# Patient Record
Sex: MALE | Race: WHITE | NOT HISPANIC OR LATINO | Employment: FULL TIME | URBAN - METROPOLITAN AREA
[De-identification: names, ages, dates, MRNs, and addresses within clinical notes are randomized per-mention and may not be internally consistent; named-entity substitution may affect disease eponyms.]

---

## 2017-06-16 ENCOUNTER — TELEPHONE (OUTPATIENT)
Dept: ORTHOPEDICS | Facility: CLINIC | Age: 32
End: 2017-06-16

## 2020-01-07 ENCOUNTER — CLINICAL SUPPORT (OUTPATIENT)
Dept: URGENT CARE | Facility: CLINIC | Age: 35
End: 2020-01-07

## 2020-01-07 PROCEDURE — 99499 PR PHYSICAL - DOT/CDL: ICD-10-PCS | Mod: S$GLB,,, | Performed by: NURSE PRACTITIONER

## 2020-01-07 PROCEDURE — 99499 UNLISTED E&M SERVICE: CPT | Mod: S$GLB,,, | Performed by: NURSE PRACTITIONER

## 2020-08-07 ENCOUNTER — HOSPITAL ENCOUNTER (INPATIENT)
Facility: HOSPITAL | Age: 35
LOS: 1 days | Discharge: HOME | DRG: 310 | End: 2020-08-08
Attending: EMERGENCY MEDICINE | Admitting: INTERNAL MEDICINE
Payer: MEDICAID

## 2020-08-07 ENCOUNTER — CLINICAL SUPPORT (OUTPATIENT)
Dept: CARDIOLOGY | Facility: HOSPITAL | Age: 35
DRG: 310 | End: 2020-08-07
Attending: INTERNAL MEDICINE
Payer: MEDICAID

## 2020-08-07 DIAGNOSIS — R07.9 CHEST PAIN: ICD-10-CM

## 2020-08-07 DIAGNOSIS — I48.91 RAPID ATRIAL FIBRILLATION: ICD-10-CM

## 2020-08-07 DIAGNOSIS — I48.91 ATRIAL FIBRILLATION, UNSPECIFIED TYPE: Primary | ICD-10-CM

## 2020-08-07 DIAGNOSIS — I48.91 A-FIB: ICD-10-CM

## 2020-08-07 LAB
ALBUMIN SERPL BCP-MCNC: 4.8 G/DL (ref 3.5–5.2)
ALP SERPL-CCNC: 67 U/L (ref 55–135)
ALT SERPL W/O P-5'-P-CCNC: 36 U/L (ref 10–44)
AMPHET+METHAMPHET UR QL: NEGATIVE
ANION GAP SERPL CALC-SCNC: 12 MMOL/L (ref 8–16)
AORTIC ROOT ANNULUS: 2.17 CM
AORTIC VALVE CUSP SEPERATION: 1.17 CM
AST SERPL-CCNC: 28 U/L (ref 10–40)
AV INDEX (PROSTH): 0.93
AV MEAN GRADIENT: 3 MMHG
AV PEAK GRADIENT: 6 MMHG
AV VALVE AREA: 2.83 CM2
AV VELOCITY RATIO: 83.07
BARBITURATES UR QL SCN>200 NG/ML: NEGATIVE
BASOPHILS # BLD AUTO: 0.12 K/UL (ref 0–0.2)
BASOPHILS NFR BLD: 1.2 % (ref 0–1.9)
BENZODIAZ UR QL SCN>200 NG/ML: NEGATIVE
BILIRUB SERPL-MCNC: 1.4 MG/DL (ref 0.1–1)
BNP SERPL-MCNC: 50 PG/ML (ref 0–99)
BSA FOR ECHO PROCEDURE: 2.33 M2
BUN SERPL-MCNC: 17 MG/DL (ref 6–20)
BZE UR QL SCN: NEGATIVE
CALCIUM SERPL-MCNC: 9.7 MG/DL (ref 8.7–10.5)
CANNABINOIDS UR QL SCN: NORMAL
CHLORIDE SERPL-SCNC: 104 MMOL/L (ref 95–110)
CK MB SERPL-MCNC: 2.6 NG/ML (ref 0.1–6.5)
CK SERPL-CCNC: 220 U/L (ref 20–200)
CO2 SERPL-SCNC: 23 MMOL/L (ref 23–29)
CREAT SERPL-MCNC: 1.3 MG/DL (ref 0.5–1.4)
CREAT UR-MCNC: 86 MG/DL (ref 23–375)
CV ECHO LV RWT: 0.42 CM
D DIMER PPP IA.FEU-MCNC: <0.27 UG/ML FEU
DIFFERENTIAL METHOD: ABNORMAL
DOP CALC AO PEAK VEL: 1.21 M/S
DOP CALC AO VTI: 18.61 CM
DOP CALC LVOT AREA: 3 CM2
DOP CALC LVOT DIAMETER: 1.97 CM
DOP CALC LVOT PEAK VEL: 100.52 M/S
DOP CALC LVOT STROKE VOLUME: 52.58 CM3
DOP CALCLVOT PEAK VEL VTI: 17.26 CM
E WAVE DECELERATION TIME: 201.65 MSEC
E/E' RATIO: 6.77 M/S
ECHO LV POSTERIOR WALL: 1 CM (ref 0.6–1.1)
EOSINOPHIL # BLD AUTO: 0.3 K/UL (ref 0–0.5)
EOSINOPHIL NFR BLD: 3.5 % (ref 0–8)
ERYTHROCYTE [DISTWIDTH] IN BLOOD BY AUTOMATED COUNT: 12.4 % (ref 11.5–14.5)
EST. GFR  (AFRICAN AMERICAN): >60 ML/MIN/1.73 M^2
EST. GFR  (NON AFRICAN AMERICAN): >60 ML/MIN/1.73 M^2
FRACTIONAL SHORTENING: 31 % (ref 28–44)
GLUCOSE SERPL-MCNC: 143 MG/DL (ref 70–110)
HCT VFR BLD AUTO: 54.1 % (ref 40–54)
HGB BLD-MCNC: 18.8 G/DL (ref 14–18)
IMM GRANULOCYTES # BLD AUTO: 0.05 K/UL (ref 0–0.04)
IMM GRANULOCYTES NFR BLD AUTO: 0.5 % (ref 0–0.5)
INTERVENTRICULAR SEPTUM: 1 CM (ref 0.6–1.1)
LEFT ATRIUM SIZE: 2.63 CM
LEFT INTERNAL DIMENSION IN SYSTOLE: 3.3 CM (ref 2.1–4)
LEFT VENTRICLE DIASTOLIC VOLUME INDEX: 40.67 ML/M2
LEFT VENTRICLE DIASTOLIC VOLUME: 92.79 ML
LEFT VENTRICLE MASS INDEX: 75 G/M2
LEFT VENTRICLE SYSTOLIC VOLUME INDEX: 19.2 ML/M2
LEFT VENTRICLE SYSTOLIC VOLUME: 43.71 ML
LEFT VENTRICULAR INTERNAL DIMENSION IN DIASTOLE: 4.8 CM (ref 3.5–6)
LEFT VENTRICULAR MASS: 170.19 G
LV LATERAL E/E' RATIO: 6.29 M/S
LV SEPTAL E/E' RATIO: 7.33 M/S
LYMPHOCYTES # BLD AUTO: 3 K/UL (ref 1–4.8)
LYMPHOCYTES NFR BLD: 31.3 % (ref 18–48)
MAGNESIUM SERPL-MCNC: 2.3 MG/DL (ref 1.6–2.6)
MCH RBC QN AUTO: 30.9 PG (ref 27–31)
MCHC RBC AUTO-ENTMCNC: 34.8 G/DL (ref 32–36)
MCV RBC AUTO: 89 FL (ref 82–98)
MONOCYTES # BLD AUTO: 0.6 K/UL (ref 0.3–1)
MONOCYTES NFR BLD: 6.4 % (ref 4–15)
MV PEAK E VEL: 0.88 M/S
NEUTROPHILS # BLD AUTO: 5.5 K/UL (ref 1.8–7.7)
NEUTROPHILS NFR BLD: 57.1 % (ref 38–73)
NRBC BLD-RTO: 0 /100 WBC
OPIATES UR QL SCN: NEGATIVE
PCP UR QL SCN>25 NG/ML: NEGATIVE
PISA TR MAX VEL: 2.81 M/S
PLATELET # BLD AUTO: 332 K/UL (ref 150–350)
PMV BLD AUTO: 10.9 FL (ref 9.2–12.9)
POTASSIUM SERPL-SCNC: 4.1 MMOL/L (ref 3.5–5.1)
PROT SERPL-MCNC: 8.4 G/DL (ref 6–8.4)
PV PEAK VELOCITY: 130.04 CM/S
RA PRESSURE: 3 MMHG
RBC # BLD AUTO: 6.08 M/UL (ref 4.6–6.2)
SARS-COV-2 RDRP RESP QL NAA+PROBE: NEGATIVE
SODIUM SERPL-SCNC: 139 MMOL/L (ref 136–145)
TDI LATERAL: 0.14 M/S
TDI SEPTAL: 0.12 M/S
TDI: 0.13 M/S
TOXICOLOGY INFORMATION: NORMAL
TR MAX PG: 32 MMHG
TROPONIN I SERPL DL<=0.01 NG/ML-MCNC: <0.03 NG/ML
TSH SERPL DL<=0.005 MIU/L-ACNC: 1.32 UIU/ML (ref 0.34–5.6)
TV REST PULMONARY ARTERY PRESSURE: 35 MMHG
WBC # BLD AUTO: 9.63 K/UL (ref 3.9–12.7)

## 2020-08-07 PROCEDURE — 93306 TTE W/DOPPLER COMPLETE: CPT

## 2020-08-07 PROCEDURE — 25000003 PHARM REV CODE 250: Performed by: INTERNAL MEDICINE

## 2020-08-07 PROCEDURE — 99900035 HC TECH TIME PER 15 MIN (STAT)

## 2020-08-07 PROCEDURE — 94761 N-INVAS EAR/PLS OXIMETRY MLT: CPT

## 2020-08-07 PROCEDURE — 80053 COMPREHEN METABOLIC PANEL: CPT

## 2020-08-07 PROCEDURE — 25500020 PHARM REV CODE 255: Performed by: EMERGENCY MEDICINE

## 2020-08-07 PROCEDURE — 25000003 PHARM REV CODE 250: Performed by: EMERGENCY MEDICINE

## 2020-08-07 PROCEDURE — 85379 FIBRIN DEGRADATION QUANT: CPT

## 2020-08-07 PROCEDURE — 36415 COLL VENOUS BLD VENIPUNCTURE: CPT

## 2020-08-07 PROCEDURE — U0002 COVID-19 LAB TEST NON-CDC: HCPCS

## 2020-08-07 PROCEDURE — 96372 THER/PROPH/DIAG INJ SC/IM: CPT | Mod: 59

## 2020-08-07 PROCEDURE — 99291 CRITICAL CARE FIRST HOUR: CPT

## 2020-08-07 PROCEDURE — 63600175 PHARM REV CODE 636 W HCPCS: Performed by: EMERGENCY MEDICINE

## 2020-08-07 PROCEDURE — 84484 ASSAY OF TROPONIN QUANT: CPT | Mod: 91

## 2020-08-07 PROCEDURE — 82550 ASSAY OF CK (CPK): CPT

## 2020-08-07 PROCEDURE — 63600175 PHARM REV CODE 636 W HCPCS: Performed by: INTERNAL MEDICINE

## 2020-08-07 PROCEDURE — 21000000 HC CCU ICU ROOM CHARGE

## 2020-08-07 PROCEDURE — 85025 COMPLETE CBC W/AUTO DIFF WBC: CPT

## 2020-08-07 PROCEDURE — 83735 ASSAY OF MAGNESIUM: CPT

## 2020-08-07 PROCEDURE — 80307 DRUG TEST PRSMV CHEM ANLYZR: CPT

## 2020-08-07 PROCEDURE — 84443 ASSAY THYROID STIM HORMONE: CPT

## 2020-08-07 PROCEDURE — 83880 ASSAY OF NATRIURETIC PEPTIDE: CPT

## 2020-08-07 PROCEDURE — 82553 CREATINE MB FRACTION: CPT

## 2020-08-07 PROCEDURE — 93005 ELECTROCARDIOGRAM TRACING: CPT | Performed by: INTERNAL MEDICINE

## 2020-08-07 PROCEDURE — 96374 THER/PROPH/DIAG INJ IV PUSH: CPT

## 2020-08-07 RX ORDER — DIGOXIN 0.25 MG/ML
250 INJECTION INTRAMUSCULAR; INTRAVENOUS ONCE
Status: COMPLETED | OUTPATIENT
Start: 2020-08-07 | End: 2020-08-07

## 2020-08-07 RX ORDER — ENOXAPARIN SODIUM 100 MG/ML
100 INJECTION SUBCUTANEOUS
Status: COMPLETED | OUTPATIENT
Start: 2020-08-07 | End: 2020-08-07

## 2020-08-07 RX ORDER — ADENOSINE 3 MG/ML
6 INJECTION, SOLUTION INTRAVENOUS
Status: COMPLETED | OUTPATIENT
Start: 2020-08-07 | End: 2020-08-07

## 2020-08-07 RX ORDER — LANOLIN ALCOHOL/MO/W.PET/CERES
800 CREAM (GRAM) TOPICAL
Status: DISCONTINUED | OUTPATIENT
Start: 2020-08-07 | End: 2020-08-08 | Stop reason: HOSPADM

## 2020-08-07 RX ORDER — DILTIAZEM HYDROCHLORIDE 5 MG/ML
10 INJECTION INTRAVENOUS
Status: DISCONTINUED | OUTPATIENT
Start: 2020-08-07 | End: 2020-08-07

## 2020-08-07 RX ORDER — DILTIAZEM HYDROCHLORIDE 5 MG/ML
10 INJECTION INTRAVENOUS
Status: COMPLETED | OUTPATIENT
Start: 2020-08-07 | End: 2020-08-07

## 2020-08-07 RX ORDER — POTASSIUM CHLORIDE 1.5 G/1.58G
40 POWDER, FOR SOLUTION ORAL
Status: DISCONTINUED | OUTPATIENT
Start: 2020-08-07 | End: 2020-08-08 | Stop reason: HOSPADM

## 2020-08-07 RX ORDER — ENOXAPARIN SODIUM 300 MG/3ML
1 INJECTION INTRAVENOUS; SUBCUTANEOUS
Status: DISCONTINUED | OUTPATIENT
Start: 2020-08-07 | End: 2020-08-07

## 2020-08-07 RX ORDER — SODIUM CHLORIDE, SODIUM LACTATE, POTASSIUM CHLORIDE, CALCIUM CHLORIDE 600; 310; 30; 20 MG/100ML; MG/100ML; MG/100ML; MG/100ML
1000 INJECTION, SOLUTION INTRAVENOUS
Status: COMPLETED | OUTPATIENT
Start: 2020-08-07 | End: 2020-08-07

## 2020-08-07 RX ORDER — ACETAMINOPHEN 325 MG/1
650 TABLET ORAL EVERY 4 HOURS PRN
Status: DISCONTINUED | OUTPATIENT
Start: 2020-08-07 | End: 2020-08-08 | Stop reason: HOSPADM

## 2020-08-07 RX ORDER — CETIRIZINE HYDROCHLORIDE 10 MG/1
10 TABLET ORAL DAILY
Status: DISCONTINUED | OUTPATIENT
Start: 2020-08-08 | End: 2020-08-08 | Stop reason: HOSPADM

## 2020-08-07 RX ORDER — ENOXAPARIN SODIUM 100 MG/ML
1 INJECTION SUBCUTANEOUS
Status: DISCONTINUED | OUTPATIENT
Start: 2020-08-07 | End: 2020-08-08

## 2020-08-07 RX ORDER — CETIRIZINE HYDROCHLORIDE 10 MG/1
10 TABLET ORAL DAILY
COMMUNITY
End: 2021-09-02

## 2020-08-07 RX ORDER — ZOLPIDEM TARTRATE 10 MG/1
10 TABLET ORAL NIGHTLY PRN
Status: DISCONTINUED | OUTPATIENT
Start: 2020-08-07 | End: 2020-08-08 | Stop reason: HOSPADM

## 2020-08-07 RX ORDER — CETIRIZINE HYDROCHLORIDE 10 MG/1
10 TABLET ORAL DAILY
Status: DISCONTINUED | OUTPATIENT
Start: 2020-08-07 | End: 2020-08-07

## 2020-08-07 RX ORDER — SODIUM CHLORIDE 0.9 % (FLUSH) 0.9 %
10 SYRINGE (ML) INJECTION
Status: DISCONTINUED | OUTPATIENT
Start: 2020-08-07 | End: 2020-08-08 | Stop reason: HOSPADM

## 2020-08-07 RX ORDER — DILTIAZEM HCL 1 MG/ML
5 INJECTION, SOLUTION INTRAVENOUS CONTINUOUS
Status: DISCONTINUED | OUTPATIENT
Start: 2020-08-07 | End: 2020-08-08 | Stop reason: HOSPADM

## 2020-08-07 RX ORDER — ONDANSETRON 2 MG/ML
4 INJECTION INTRAMUSCULAR; INTRAVENOUS EVERY 8 HOURS PRN
Status: DISCONTINUED | OUTPATIENT
Start: 2020-08-07 | End: 2020-08-08 | Stop reason: HOSPADM

## 2020-08-07 RX ADMIN — ENOXAPARIN SODIUM 110 MG: 60 INJECTION SUBCUTANEOUS at 09:08

## 2020-08-07 RX ADMIN — ZOLPIDEM TARTRATE 10 MG: 10 TABLET ORAL at 09:08

## 2020-08-07 RX ADMIN — ENOXAPARIN SODIUM 100 MG: 100 INJECTION SUBCUTANEOUS at 09:08

## 2020-08-07 RX ADMIN — ADENOSINE 6 MG: 3 INJECTION, SOLUTION INTRAVENOUS at 08:08

## 2020-08-07 RX ADMIN — DILTIAZEM HYDROCHLORIDE 10 MG: 5 INJECTION INTRAVENOUS at 08:08

## 2020-08-07 RX ADMIN — DILTIAZEM HYDROCHLORIDE 5 MG/HR: 5 INJECTION INTRAVENOUS at 09:08

## 2020-08-07 RX ADMIN — DILTIAZEM HYDROCHLORIDE 15 MG/HR: 5 INJECTION INTRAVENOUS at 05:08

## 2020-08-07 RX ADMIN — SODIUM CHLORIDE, SODIUM LACTATE, POTASSIUM CHLORIDE, AND CALCIUM CHLORIDE 1000 ML: .6; .31; .03; .02 INJECTION, SOLUTION INTRAVENOUS at 08:08

## 2020-08-07 RX ADMIN — DIGOXIN 250 MCG: 0.25 INJECTION INTRAMUSCULAR; INTRAVENOUS at 09:08

## 2020-08-07 RX ADMIN — IOHEXOL 100 ML: 350 INJECTION, SOLUTION INTRAVENOUS at 10:08

## 2020-08-07 NOTE — ED PROVIDER NOTES
Encounter Date: 8/7/2020       History     Chief Complaint   Patient presents with    Shortness of Breath    Headache     35-year-old male with history of hypertension.  Patient presents emergency department with complaint new onset shortness of breath, generalized weakness palpitations which began this morning.  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  Patient became slightly short of breath and diaphoretic decided come to the emergency department further evaluation.  Upon arrival to emergency department patient found with heart rate greater than 200.  Patient states he has not had previous history of a arrhythmia, he has not been taking energy supplementation, denies illicit drug use.  Patient denies any previous history of fever, no cough, no URI symptoms.        Review of patient's allergies indicates:  No Known Allergies  No past medical history on file.  No past surgical history on file.  No family history on file.  Social History     Tobacco Use    Smoking status: Not on file   Substance Use Topics    Alcohol use: Not on file    Drug use: Yes     Comment: heroin iv     Review of Systems   Constitutional: Negative for fever.   HENT: Negative for sore throat.    Respiratory: Positive for chest tightness and shortness of breath.    Cardiovascular: Positive for palpitations. Negative for chest pain.   Gastrointestinal: Negative for nausea and vomiting.   Genitourinary: Negative for dysuria.   Musculoskeletal: Negative for back pain.   Skin: Negative for rash.   Neurological: Negative for weakness.   Hematological: Does not bruise/bleed easily.       Physical Exam     Initial Vitals [08/07/20 0808]   BP Pulse Resp Temp SpO2   (!) 118/56 (!) 119 (!) 22 96 °F (35.6 °C) 99 %      MAP       --         Physical Exam    Nursing note and vitals reviewed.  Constitutional: He appears well-developed and well-nourished.   HENT:   Head: Normocephalic and  atraumatic.   Nose: Nose normal.   Mouth/Throat: Oropharynx is clear and moist.   Eyes: Conjunctivae and EOM are normal. Pupils are equal, round, and reactive to light. No scleral icterus.   Neck: Normal range of motion. Neck supple.   Cardiovascular: Regular rhythm, normal heart sounds and intact distal pulses. Exam reveals no gallop and no friction rub.    No murmur heard.  Tachycardia, irregular   Pulmonary/Chest: No stridor. No respiratory distress.   Course bilateral breath sounds no adventitious sounds   Abdominal: Soft. Bowel sounds are normal. He exhibits no mass. There is no abdominal tenderness. There is no rebound and no guarding.   Musculoskeletal: Normal range of motion. No edema.   Lymphadenopathy:     He has no cervical adenopathy.   Neurological: He is alert and oriented to person, place, and time. He has normal strength and normal reflexes. No cranial nerve deficit or sensory deficit. GCS score is 15. GCS eye subscore is 4. GCS verbal subscore is 5. GCS motor subscore is 6.   Skin: Skin is warm and dry. Capillary refill takes less than 2 seconds. No rash noted.   Psychiatric: He has a normal mood and affect. His behavior is normal. Judgment and thought content normal.         ED Course   Procedures  Labs Reviewed   CBC W/ AUTO DIFFERENTIAL - Abnormal; Notable for the following components:       Result Value    Hemoglobin 18.8 (*)     Hematocrit 54.1 (*)     Immature Grans (Abs) 0.05 (*)     All other components within normal limits   COMPREHENSIVE METABOLIC PANEL - Abnormal; Notable for the following components:    Glucose 143 (*)     Total Bilirubin 1.4 (*)     All other components within normal limits   CK - Abnormal; Notable for the following components:     (*)     All other components within normal limits   TROPONIN I   B-TYPE NATRIURETIC PEPTIDE   D DIMER, QUANTITATIVE   TSH   DRUG SCREEN PANEL, URINE EMERGENCY    Narrative:     Specimen Source->Urine   CK-MB   SARS-COV-2 RNA  AMPLIFICATION, QUAL   MAGNESIUM   MAGNESIUM   TROPONIN I    Narrative:     STAT, if not done in ED, then at 2nd and 6th hour from  initial draw.     EKG Readings: (Independently Interpreted)   Initial Reading: No STEMI. Rhythm: Atrial Fibrillation. Axis: Normal. Clinical Impression: Atrial Fibrillation and Atrial Fibrillation with RVR   One hundred eighty-four, AFib with rapid ventricular response     ECG Results          EKG 12-lead (In process)  Result time 08/07/20 09:46:12    In process by Interface, Lab In [facility] (08/07/20 09:46:12)                 Narrative:    Test Reason : I48.91,    Vent. Rate : 191 BPM     Atrial Rate : 187 BPM     P-R Int : 000 ms          QRS Dur : 070 ms      QT Int : 256 ms       P-R-T Axes : 000 063 022 degrees     QTc Int : 456 ms    Undetermined rhythm  Otherwise normal ECG  When compared with ECG of 07-AUG-2020 08:11,  Current undetermined rhythm precludes rhythm comparison, needs review    Referred By: [email or web address]   SELF           Confirmed By:                              EKG 12-lead (In process)  Result time 08/07/20 08:31:34    In process by Interface, Lab In [facility] (08/07/20 08:31:34)                 Narrative:    Test Reason : R07.9,    Vent. Rate : 184 BPM     Atrial Rate : 187 BPM     P-R Int : 000 ms          QRS Dur : 068 ms      QT Int : 256 ms       P-R-T Axes : 000 061 027 degrees     QTc Int : 448 ms    Atrial fibrillation with rapid ventricular response with premature  ventricular or aberrantly conducted complexes  Abnormal ECG  No previous ECGs available    Referred By: [email or web address]   SELF           Confirmed By:                             Imaging Results          CTA Chest Non-Coronary (PE Study) (Final result)  Result time 08/07/20 10:16:14    Final result by Pako Jhoansen MD (08/07/20 10:16:14)                 Narrative:    CMS MANDATED QUALITY DATA - CT RADIATION - 436    All CT scans at this facility utilize dose modulation, iterative  reconstruction, and/or  weight based dosing when appropriate to reduce  radiation dose to as low as reasonably achievable.        Reason: PE suspected, high pretest prob    TECHNIQUE: CT angiography of thorax with 100 mL Omnipaque 350.  Maximum intensity projection coronal reformations were created at a  separate workstation and stored in the patient's permanent medical  record.    COMPARISON: None    CTA THORAX:  Negative for pulmonary embolus. Thoracic aorta is of normal caliber  and without dissection.    Lungs are clear. Trachea and main bronchi are patent. No pleural or  pericardial effusion. No enlarged hilar or mediastinal lymph nodes.    No abnormality occurs throughout the partially visualized upper  abdomen. No acute osseous abnormality with degenerative changes  affecting the mid to lower thoracic spine.    IMPRESSION:  Negative for pulmonary embolus or other acute intrathoracic  abnormality.    Electronically Signed by Pako Johansen M.D. on 8/7/2020 10:25 AM                             X-Ray Chest AP Portable (Final result)  Result time 08/07/20 08:50:35    Final result by Peña Messina MD (08/07/20 08:50:35)                 Narrative:    CLINICAL HISTORY:  35 years (1985) Male Chest Pain Chest pain, sob, sweats    TECHNIQUE:  Portable AP radiograph the chest.    COMPARISON:  None available.    FINDINGS:  The lungs are clear. Costophrenic angles are seen without effusion. No  pneumothorax is identified. The heart is normal in size. The  mediastinum is within normal limits. Osseous structures appear within  normal limits. The visualized upper abdomen is unremarkable.    IMPRESSION:  No acute cardiac or pulmonary process.                  .            Electronically Signed by ROBERT Ely on 8/7/2020 8:53 AM                               Medical Decision Making:   Initial Assessment:   [age] year male presents with new onset atrial fibrillation rapid ventricular response  Clinical Tests:   Lab Tests:  Ordered and Reviewed  Radiological Study: Ordered and Reviewed  Medical Tests: Ordered and Reviewed  ED Management:  Patient emergency department found with new onset atrial fibrillation rapid ventricular response.  Patient given Cardizem infusion in the emergency department.  This time patient remained hemodynamically stable currently awaiting admission to hospitalist services.  Patient did have good rate response from rate to 200s to rate of 125-131 currently being titrated on Cardizem infusion.              Attending Attestation:         Attending Critical Care:   Critical Care Times:   Direct Patient Care (initial evaluation, reassessments, and time considering the case)................................................................15 minutes.   Additional History from reviewing old medical records or taking additional history from the family, EMS, PCP, etc.......................10 minutes.   Ordering, Reviewing, and Interpreting Diagnostic Studies...............................................................................................................10 minutes.   Documentation..................................................................................................................................................................................10 minutes.   Consultation with other Physicians. .................................................................................................................................................10 minutes.   ==============================================================  · Total Critical Care Time - exclusive of procedural time: 55 minutes.  ==============================================================  Critical care was necessary to treat or prevent imminent or life-threatening deterioration of the following conditions: cardiac arrhythmia and hypotension.   Critical care was time spent personally by me on the following activities: obtaining history from  patient or relative, examination of patient, review of x-rays / CT sent with the patient, review of old charts, ordering lab, x-rays, and/or EKG, development of treatment plan with patient or relative, ordering and performing treatments and interventions, evaluation of patient's response to treatment, discussions with primary provider, discussion with consultants, interpretation of cardiac measurements and re-evaluation of patient's conition.   Critical Care Condition: potentially life-threatening                             Clinical Impression:       ICD-10-CM ICD-9-CM   1. Atrial fibrillation, unspecified type  I48.91 427.31   2. Chest pain  R07.9 786.50   3. Rapid atrial fibrillation  I48.91 427.31             ED Disposition Condition    Admit                           Donald Kirk MD  08/07/20 8315

## 2020-08-07 NOTE — CONSULTS
[facility]  Cardiology  Consult Note    Patient Name: Nabil Urias  MRN: [id number]  Admission Date: 8/7/2020  Hospital Length of Stay: 0 days  Code Status: Full Code   Attending Provider: Roland Sheppard MD   Consulting Provider: Danielle Ramirez MD  Primary Care Physician: Primary Doctor No  Principal Problem:Rapid atrial fibrillation    Patient information was obtained from patient, past medical records and ER records.     Inpatient consult to Cardiology  Consult performed by: Danielle Ramirez MD  Consult ordered by: Roland Sheppard MD        Subjective:     REASON FOR CONSULT:  Atrial fibrillation     HPI:  35-year-old male with a past medical history significant for murmur as a young adult presented to the hospital with complaints of lightheadedness, dizziness.  He reportedly woke up around 530 this morning and felt weak and lightheaded.  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  He was found to be in atrial fibrillation with rapid ventricular response.  He initially was given adenosine with no effect.  He was subsequently started on Cardizem drip with better rate control and was admitted to the hospital for further evaluation and management.  He had shortness of breath and some chest tightness and palpitations this morning however at the time of my interview he denies any other symptoms.  He feels better.  He is on Cardizem drip at 15 milligrams/hour and his heart rates are in the low 100s.  He denies any syncopal episodes.  He denies any symptoms suggestive of stroke or TIA.  He denies any previous history of strokes or any other heart problems.    History reviewed. No pertinent past medical history.    History reviewed. No pertinent surgical history.    Review of patient's allergies indicates:  No Known  Allergies    No current facility-administered medications on file prior to encounter.      Current Outpatient Medications on File Prior to Encounter   Medication Sig    cetirizine (ZYRTEC) 10 MG tablet Take 10 mg by mouth once daily.       Scheduled Meds:   [START ON 8/8/2020] cetirizine  10 mg Oral Daily    enoxparin  1 mg/kg Subcutaneous Q12H     Continuous Infusions:   dilTIAZem 15 mg/hr (08/07/20 1703)     PRN Meds:.acetaminophen, magnesium oxide, magnesium oxide, ondansetron, potassium chloride, potassium chloride, promethazine (PHENERGAN) IVPB, sodium chloride 0.9%    Family History     None          Tobacco Use    Smoking status: Not on file   Substance and Sexual Activity    Alcohol use: Not on file    Drug use: Yes     Comment: heroin iv    Sexual activity: Not on file       ROS   No significant headaches or sore throat or runny nose.   No recent changes in vision.   No recent changes in hearing.  No dysphagia or odynophagia.  Reports chest heaviness, palpitation and shortness of breath.   Denies any cough or hemoptysis.   Denies any abdominal pain, nausea, vomiting, diarrhea or constipation.   Denies any dysuria or polyuria.   Denies any fevers or chills.   Denies any recent significant weight changes.   Denies bleeding diathesis    Objective:     Vital Signs (Most Recent):  Temp: 97.1 °F (36.2 °C) (08/07/20 1600)  Pulse: 103 (08/07/20 1700)  Resp: (!) 22 (08/07/20 1700)  BP: (!) 147/86 (08/07/20 1700)  SpO2: 99 % (08/07/20 1700) Vital Signs (24h Range):  Temp:  [96 °F (35.6 °C)-97.1 °F (36.2 °C)] 97.1 °F (36.2 °C)  Pulse:  [] 103  Resp:  [12-25] 22  SpO2:  [95 %-100 %] 99 %  BP: (101-147)/(55-91) 147/86     Weight: 107.5 kg (236 lb 15.9 oz)  Body mass index is 32.14 kg/m².    SpO2: 99 %  O2 Device (Oxygen Therapy): room air      Intake/Output Summary (Last 24 hours) at 8/7/2020 6545  Last data filed at 8/7/2020 1642  Gross per 24 hour   Intake --   Output 250 ml   Net -250 ml        Lines/Drains/Airways     Peripheral Intravenous Line                 Peripheral IV - Single Lumen 08/07/20 0824 18 G Right Antecubital less than 1 day                Physical Exam  HEENT: Normocephalic, atraumatic, PERRL, Conjunctiva pink, no scleral icterus.   CVS: S1S2+, iRRR, no murmurs, rubs or gallops, JVP: Normal.  LUNGS: Clear  ABDOMEN: Soft, NT, BS+  EXTREMITIES: No cyanosis, clubbing or edema  NEURO: AAO X 3.       Significant Labs:   BMP:   Recent Labs   Lab 08/07/20  0825   *      K 4.1      CO2 23   BUN 17   CREATININE 1.3   CALCIUM 9.7   MG 2.3   , CMP   Recent Labs   Lab 08/07/20  0825      K 4.1      CO2 23   *   BUN 17   CREATININE 1.3   CALCIUM 9.7   PROT 8.4   ALBUMIN 4.8   BILITOT 1.4*   ALKPHOS 67   AST 28   ALT 36   ANIONGAP 12   ESTGFRAFRICA >60.0   EGFRNONAA >60.0   , CBC   Recent Labs   Lab 08/07/20  0825   WBC 9.63   HGB 18.8*   HCT 54.1*      , INR No results for input(s): INR, PROTIME in the last 48 hours., Lipid Panel No results for input(s): CHOL, HDL, LDLCALC, TRIG, CHOLHDL in the last 48 hours. and Troponin   Recent Labs   Lab 08/07/20  0825 08/07/20  1302   TROPONINI <0.030 <0.030       Significant Imaging: Reviewed  Assessment and Plan:     IMPRESSION:  Atrial fibrillation.  Rapid ventricular response.  Paroxysmal/lone AFib.  Currently on Cardizem GTT.  Possible obstructive sleep apnea.  Patient reports snoring, daytime fatigue and his pulse her symptom he stops breathing in the middle of the night at times.  Hypertension.  Marijuana use      RECOMMENDATIONS:  1.  Continue Cardizem GTT and Lovenox for now.  2.  Will give digoxin 250 mcg for 1 time dose.  3.  If the patient persists in atrial fibrillation, would consider GABRIELA cardioversion in the morning.  4.  Patient would need a sleep study as an outpatient.    Thank you for your consult. I will follow-up with patient. Please contact us if you have any additional  questions.    Danielle Ramirez MD  Cardiology   [facility] hospital

## 2020-08-07 NOTE — H&P (VIEW-ONLY)
[facility]  Cardiology  Consult Note    Patient Name: Nabil Urias  MRN: [id number]  Admission Date: 8/7/2020  Hospital Length of Stay: 0 days  Code Status: Full Code   Attending Provider: Roland Sheppard MD   Consulting Provider: Danielle Ramirez MD  Primary Care Physician: Primary Doctor No  Principal Problem:Rapid atrial fibrillation    Patient information was obtained from patient, past medical records and ER records.     Inpatient consult to Cardiology  Consult performed by: Danielle Ramirez MD  Consult ordered by: Roland Sheppard MD        Subjective:     REASON FOR CONSULT:  Atrial fibrillation     HPI:  35-year-old male with a past medical history significant for murmur as a young adult presented to the hospital with complaints of lightheadedness, dizziness.  He reportedly woke up around 530 this morning and felt weak and lightheaded.  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  He was found to be in atrial fibrillation with rapid ventricular response.  He initially was given adenosine with no effect.  He was subsequently started on Cardizem drip with better rate control and was admitted to the hospital for further evaluation and management.  He had shortness of breath and some chest tightness and palpitations this morning however at the time of my interview he denies any other symptoms.  He feels better.  He is on Cardizem drip at 15 milligrams/hour and his heart rates are in the low 100s.  He denies any syncopal episodes.  He denies any symptoms suggestive of stroke or TIA.  He denies any previous history of strokes or any other heart problems.    History reviewed. No pertinent past medical history.    History reviewed. No pertinent surgical history.    Review of patient's allergies indicates:  No Known  Allergies    No current facility-administered medications on file prior to encounter.      Current Outpatient Medications on File Prior to Encounter   Medication Sig    cetirizine (ZYRTEC) 10 MG tablet Take 10 mg by mouth once daily.       Scheduled Meds:   [START ON 8/8/2020] cetirizine  10 mg Oral Daily    enoxparin  1 mg/kg Subcutaneous Q12H     Continuous Infusions:   dilTIAZem 15 mg/hr (08/07/20 1703)     PRN Meds:.acetaminophen, magnesium oxide, magnesium oxide, ondansetron, potassium chloride, potassium chloride, promethazine (PHENERGAN) IVPB, sodium chloride 0.9%    Family History     None          Tobacco Use    Smoking status: Not on file   Substance and Sexual Activity    Alcohol use: Not on file    Drug use: Yes     Comment: heroin iv    Sexual activity: Not on file       ROS   No significant headaches or sore throat or runny nose.   No recent changes in vision.   No recent changes in hearing.  No dysphagia or odynophagia.  Reports chest heaviness, palpitation and shortness of breath.   Denies any cough or hemoptysis.   Denies any abdominal pain, nausea, vomiting, diarrhea or constipation.   Denies any dysuria or polyuria.   Denies any fevers or chills.   Denies any recent significant weight changes.   Denies bleeding diathesis    Objective:     Vital Signs (Most Recent):  Temp: 97.1 °F (36.2 °C) (08/07/20 1600)  Pulse: 103 (08/07/20 1700)  Resp: (!) 22 (08/07/20 1700)  BP: (!) 147/86 (08/07/20 1700)  SpO2: 99 % (08/07/20 1700) Vital Signs (24h Range):  Temp:  [96 °F (35.6 °C)-97.1 °F (36.2 °C)] 97.1 °F (36.2 °C)  Pulse:  [] 103  Resp:  [12-25] 22  SpO2:  [95 %-100 %] 99 %  BP: (101-147)/(55-91) 147/86     Weight: 107.5 kg (236 lb 15.9 oz)  Body mass index is 32.14 kg/m².    SpO2: 99 %  O2 Device (Oxygen Therapy): room air      Intake/Output Summary (Last 24 hours) at 8/7/2020 3458  Last data filed at 8/7/2020 1644  Gross per 24 hour   Intake --   Output 250 ml   Net -250 ml        Lines/Drains/Airways     Peripheral Intravenous Line                 Peripheral IV - Single Lumen 08/07/20 0824 18 G Right Antecubital less than 1 day                Physical Exam  HEENT: Normocephalic, atraumatic, PERRL, Conjunctiva pink, no scleral icterus.   CVS: S1S2+, iRRR, no murmurs, rubs or gallops, JVP: Normal.  LUNGS: Clear  ABDOMEN: Soft, NT, BS+  EXTREMITIES: No cyanosis, clubbing or edema  NEURO: AAO X 3.       Significant Labs:   BMP:   Recent Labs   Lab 08/07/20  0825   *      K 4.1      CO2 23   BUN 17   CREATININE 1.3   CALCIUM 9.7   MG 2.3   , CMP   Recent Labs   Lab 08/07/20  0825      K 4.1      CO2 23   *   BUN 17   CREATININE 1.3   CALCIUM 9.7   PROT 8.4   ALBUMIN 4.8   BILITOT 1.4*   ALKPHOS 67   AST 28   ALT 36   ANIONGAP 12   ESTGFRAFRICA >60.0   EGFRNONAA >60.0   , CBC   Recent Labs   Lab 08/07/20  0825   WBC 9.63   HGB 18.8*   HCT 54.1*      , INR No results for input(s): INR, PROTIME in the last 48 hours., Lipid Panel No results for input(s): CHOL, HDL, LDLCALC, TRIG, CHOLHDL in the last 48 hours. and Troponin   Recent Labs   Lab 08/07/20  0825 08/07/20  1302   TROPONINI <0.030 <0.030       Significant Imaging: Reviewed  Assessment and Plan:     IMPRESSION:  Atrial fibrillation.  Rapid ventricular response.  Paroxysmal/lone AFib.  Currently on Cardizem GTT.  Possible obstructive sleep apnea.  Patient reports snoring, daytime fatigue and his pulse her symptom he stops breathing in the middle of the night at times.  Hypertension.  Marijuana use      RECOMMENDATIONS:  1.  Continue Cardizem GTT and Lovenox for now.  2.  Will give digoxin 250 mcg for 1 time dose.  3.  If the patient persists in atrial fibrillation, would consider GABRIELA cardioversion in the morning.  4.  Patient would need a sleep study as an outpatient.    Thank you for your consult. I will follow-up with patient. Please contact us if you have any additional  questions.    Danielle Ramirez MD  Cardiology   [facility]

## 2020-08-07 NOTE — NURSING
Pt arrived from ED stating he is leaving at 7 pm regardless of what's happening with his heart or not. Notified MD of pts statement and he is aware and has already discussed the risk. Pt currently on cardizem gtt at 15mg/hr HR afib . Will continue to monitor

## 2020-08-07 NOTE — NURSING
Pt seen by Dr. Monroe and is going to stay. If pt does not convert over night MD plans to cardiovert in AM.

## 2020-08-08 ENCOUNTER — ANESTHESIA (OUTPATIENT)
Dept: CARDIOLOGY | Facility: HOSPITAL | Age: 35
DRG: 310 | End: 2020-08-08
Payer: MEDICAID

## 2020-08-08 ENCOUNTER — CLINICAL SUPPORT (OUTPATIENT)
Dept: CARDIOLOGY | Facility: HOSPITAL | Age: 35
DRG: 310 | End: 2020-08-08
Attending: INTERNAL MEDICINE
Payer: MEDICAID

## 2020-08-08 ENCOUNTER — ANESTHESIA EVENT (OUTPATIENT)
Dept: CARDIOLOGY | Facility: HOSPITAL | Age: 35
DRG: 310 | End: 2020-08-08
Payer: MEDICAID

## 2020-08-08 VITALS
TEMPERATURE: 98 F | WEIGHT: 235.69 LBS | HEIGHT: 72 IN | OXYGEN SATURATION: 99 % | BODY MASS INDEX: 31.92 KG/M2 | DIASTOLIC BLOOD PRESSURE: 78 MMHG | HEART RATE: 72 BPM | RESPIRATION RATE: 18 BRPM | SYSTOLIC BLOOD PRESSURE: 107 MMHG

## 2020-08-08 LAB
ALBUMIN SERPL BCP-MCNC: 4.1 G/DL (ref 3.5–5.2)
ALP SERPL-CCNC: 52 U/L (ref 55–135)
ALT SERPL W/O P-5'-P-CCNC: 30 U/L (ref 10–44)
ANION GAP SERPL CALC-SCNC: 8 MMOL/L (ref 8–16)
AST SERPL-CCNC: 21 U/L (ref 10–40)
BASOPHILS # BLD AUTO: 0.09 K/UL (ref 0–0.2)
BASOPHILS NFR BLD: 0.9 % (ref 0–1.9)
BILIRUB SERPL-MCNC: 1.2 MG/DL (ref 0.1–1)
BUN SERPL-MCNC: 15 MG/DL (ref 6–20)
CALCIUM SERPL-MCNC: 8.6 MG/DL (ref 8.7–10.5)
CHLORIDE SERPL-SCNC: 107 MMOL/L (ref 95–110)
CO2 SERPL-SCNC: 23 MMOL/L (ref 23–29)
CREAT SERPL-MCNC: 1.1 MG/DL (ref 0.5–1.4)
DIFFERENTIAL METHOD: ABNORMAL
EOSINOPHIL # BLD AUTO: 0.4 K/UL (ref 0–0.5)
EOSINOPHIL NFR BLD: 3.9 % (ref 0–8)
ERYTHROCYTE [DISTWIDTH] IN BLOOD BY AUTOMATED COUNT: 12.6 % (ref 11.5–14.5)
EST. GFR  (AFRICAN AMERICAN): >60 ML/MIN/1.73 M^2
EST. GFR  (NON AFRICAN AMERICAN): >60 ML/MIN/1.73 M^2
GLUCOSE SERPL-MCNC: 109 MG/DL (ref 70–110)
HCT VFR BLD AUTO: 49.2 % (ref 40–54)
HGB BLD-MCNC: 17.1 G/DL (ref 14–18)
IMM GRANULOCYTES # BLD AUTO: 0.05 K/UL (ref 0–0.04)
IMM GRANULOCYTES NFR BLD AUTO: 0.5 % (ref 0–0.5)
LYMPHOCYTES # BLD AUTO: 3.3 K/UL (ref 1–4.8)
LYMPHOCYTES NFR BLD: 32.6 % (ref 18–48)
MAGNESIUM SERPL-MCNC: 2.1 MG/DL (ref 1.6–2.6)
MCH RBC QN AUTO: 30.9 PG (ref 27–31)
MCHC RBC AUTO-ENTMCNC: 34.8 G/DL (ref 32–36)
MCV RBC AUTO: 89 FL (ref 82–98)
MONOCYTES # BLD AUTO: 0.8 K/UL (ref 0.3–1)
MONOCYTES NFR BLD: 8.1 % (ref 4–15)
NEUTROPHILS # BLD AUTO: 5.5 K/UL (ref 1.8–7.7)
NEUTROPHILS NFR BLD: 54 % (ref 38–73)
NRBC BLD-RTO: 0 /100 WBC
PLATELET # BLD AUTO: 297 K/UL (ref 150–350)
PMV BLD AUTO: 10.8 FL (ref 9.2–12.9)
POTASSIUM SERPL-SCNC: 3.8 MMOL/L (ref 3.5–5.1)
PROT SERPL-MCNC: 7 G/DL (ref 6–8.4)
RBC # BLD AUTO: 5.54 M/UL (ref 4.6–6.2)
SODIUM SERPL-SCNC: 138 MMOL/L (ref 136–145)
TSH SERPL DL<=0.005 MIU/L-ACNC: 1.61 UIU/ML (ref 0.34–5.6)
WBC # BLD AUTO: 10.1 K/UL (ref 3.9–12.7)

## 2020-08-08 PROCEDURE — 27000671 HC TUBING MICROBORE EXT: Performed by: ANESTHESIOLOGY

## 2020-08-08 PROCEDURE — 63600175 PHARM REV CODE 636 W HCPCS: Performed by: INTERNAL MEDICINE

## 2020-08-08 PROCEDURE — 36415 COLL VENOUS BLD VENIPUNCTURE: CPT

## 2020-08-08 PROCEDURE — 85025 COMPLETE CBC W/AUTO DIFF WBC: CPT

## 2020-08-08 PROCEDURE — 25000003 PHARM REV CODE 250: Performed by: NURSE ANESTHETIST, CERTIFIED REGISTERED

## 2020-08-08 PROCEDURE — 83735 ASSAY OF MAGNESIUM: CPT

## 2020-08-08 PROCEDURE — 94761 N-INVAS EAR/PLS OXIMETRY MLT: CPT

## 2020-08-08 PROCEDURE — 25000003 PHARM REV CODE 250: Performed by: INTERNAL MEDICINE

## 2020-08-08 PROCEDURE — 93321 DOPPLER ECHO F-UP/LMTD STD: CPT

## 2020-08-08 PROCEDURE — 63600175 PHARM REV CODE 636 W HCPCS: Performed by: NURSE ANESTHETIST, CERTIFIED REGISTERED

## 2020-08-08 PROCEDURE — 84443 ASSAY THYROID STIM HORMONE: CPT

## 2020-08-08 PROCEDURE — 80053 COMPREHEN METABOLIC PANEL: CPT

## 2020-08-08 PROCEDURE — 93005 ELECTROCARDIOGRAM TRACING: CPT | Performed by: INTERNAL MEDICINE

## 2020-08-08 RX ORDER — EPINEPHRINE 0.1 MG/ML
INJECTION INTRAVENOUS
Status: DISCONTINUED
Start: 2020-08-08 | End: 2020-08-08 | Stop reason: WASHOUT

## 2020-08-08 RX ORDER — ATROPINE SULFATE 0.1 MG/ML
INJECTION INTRAVENOUS
Status: DISCONTINUED
Start: 2020-08-08 | End: 2020-08-08 | Stop reason: WASHOUT

## 2020-08-08 RX ORDER — DILTIAZEM HYDROCHLORIDE 120 MG/1
120 CAPSULE, COATED, EXTENDED RELEASE ORAL DAILY
Status: DISCONTINUED | OUTPATIENT
Start: 2020-08-08 | End: 2020-08-08 | Stop reason: HOSPADM

## 2020-08-08 RX ORDER — SODIUM CHLORIDE 9 MG/ML
INJECTION, SOLUTION INTRAVENOUS CONTINUOUS PRN
Status: DISCONTINUED | OUTPATIENT
Start: 2020-08-08 | End: 2020-08-08

## 2020-08-08 RX ORDER — DILTIAZEM HYDROCHLORIDE 120 MG/1
120 CAPSULE, COATED, EXTENDED RELEASE ORAL DAILY
Qty: 30 CAPSULE | Refills: 3 | Status: SHIPPED | OUTPATIENT
Start: 2020-08-09 | End: 2021-04-06 | Stop reason: SDUPTHER

## 2020-08-08 RX ORDER — PROPOFOL 10 MG/ML
VIAL (ML) INTRAVENOUS
Status: DISCONTINUED | OUTPATIENT
Start: 2020-08-08 | End: 2020-08-08

## 2020-08-08 RX ADMIN — PROPOFOL 10 MG: 10 INJECTION, EMULSION INTRAVENOUS at 08:08

## 2020-08-08 RX ADMIN — PROPOFOL 20 MG: 10 INJECTION, EMULSION INTRAVENOUS at 08:08

## 2020-08-08 RX ADMIN — DILTIAZEM HYDROCHLORIDE 15 MG/HR: 5 INJECTION INTRAVENOUS at 03:08

## 2020-08-08 RX ADMIN — PROPOFOL 40 MG: 10 INJECTION, EMULSION INTRAVENOUS at 08:08

## 2020-08-08 RX ADMIN — ENOXAPARIN SODIUM 110 MG: 60 INJECTION SUBCUTANEOUS at 08:08

## 2020-08-08 RX ADMIN — DILTIAZEM HYDROCHLORIDE 120 MG: 120 CAPSULE, COATED, EXTENDED RELEASE ORAL at 10:08

## 2020-08-08 RX ADMIN — SODIUM CHLORIDE: 9 INJECTION, SOLUTION INTRAVENOUS at 08:08

## 2020-08-08 NOTE — PLAN OF CARE
This note also relates to the following rows which could not be included:  SpO2 - Cannot attach notes to unvalidated device data  Pulse - Cannot attach notes to unvalidated device data  Resp - Cannot attach notes to unvalidated device data       08/07/20 2100   Patient Assessment/Suction   Level of Consciousness (AVPU) alert   Respiratory Effort Normal;Unlabored   PRE-TX-O2   O2 Device (Oxygen Therapy) room air   Pulse Oximetry Type Continuous   $ Pulse Oximetry - Multiple Charge Pulse Oximetry - Multiple   Respiratory Evaluation   $ Care Plan Tech Time 15 min   Evaluation For New Orders

## 2020-08-08 NOTE — PROGRESS NOTES
[facility]  Cardiology  Progress Note    Patient Name: Nabil Urias  MRN: [id number]  Admission Date: 8/7/2020  Hospital Length of Stay: 1 days  Code Status: Full Code   Attending Physician: Roland Sheppard MD   Primary Care Physician: Primary Doctor No  Expected Discharge Date:   Principal Problem:Rapid atrial fibrillation    Subjective:       Interval History: Patient remains in atrial fibrillation with RVR. He denies any chest pain or shortness of breath.     ROS  Objective:     Vital Signs (Most Recent):  Temp: 97.5 °F (36.4 °C) (08/08/20 0716)  Pulse: (!) 141 (08/08/20 0716)  Resp: 20 (08/08/20 0315)  BP: 107/78 (08/08/20 0716)  SpO2: 100 % (08/08/20 0315) Vital Signs (24h Range):  Temp:  [96 °F (35.6 °C)-98.5 °F (36.9 °C)] 97.5 °F (36.4 °C)  Pulse:  [] 141  Resp:  [12-27] 20  SpO2:  [95 %-100 %] 100 %  BP: ()/(55-91) 107/78     Weight: 106.9 kg (235 lb 10.8 oz)  Body mass index is 31.96 kg/m².    SpO2: 100 %  O2 Device (Oxygen Therapy): room air      Intake/Output Summary (Last 24 hours) at 8/8/2020 0807  Last data filed at 8/8/2020 0315  Gross per 24 hour   Intake --   Output 750 ml   Net -750 ml       Lines/Drains/Airways     Peripheral Intravenous Line                 Peripheral IV - Single Lumen 08/07/20 0824 18 G Right Antecubital less than 1 day         Peripheral IV - Single Lumen 08/08/20 0615 20 G Posterior;Right Hand less than 1 day                Scheduled Meds:   atropine        cetirizine  10 mg Oral Daily    enoxparin  1 mg/kg Subcutaneous Q12H    EPINEPHrine         Continuous Infusions:   dilTIAZem 15 mg/hr (08/08/20 0315)     PRN Meds:.acetaminophen, magnesium oxide, magnesium oxide, ondansetron, potassium chloride, potassium chloride, promethazine (PHENERGAN) IVPB, sodium chloride 0.9%, zolpidem     Physical Exam  HEENT: Normocephalic, atraumatic, PERRL, Conjunctiva pink, no scleral icterus.   CVS: S1S2+, iRRR, no murmurs, rubs or gallops, JVP:  Normal.  LUNGS: Clear  ABDOMEN: Soft, NT, BS+  EXTREMITIES: No cyanosis, clubbing or edema  NEURO: AAO X 3.       Significant Labs:   BMP:   Recent Labs   Lab 08/07/20  0825 08/08/20  0357   * 109    138   K 4.1 3.8    107   CO2 23 23   BUN 17 15   CREATININE 1.3 1.1   CALCIUM 9.7 8.6*   MG 2.3 2.1   , CMP   Recent Labs   Lab 08/07/20  0825 08/08/20  0357    138   K 4.1 3.8    107   CO2 23 23   * 109   BUN 17 15   CREATININE 1.3 1.1   CALCIUM 9.7 8.6*   PROT 8.4 7.0   ALBUMIN 4.8 4.1   BILITOT 1.4* 1.2*   ALKPHOS 67 52*   AST 28 21   ALT 36 30   ANIONGAP 12 8   ESTGFRAFRICA >60.0 >60.0   EGFRNONAA >60.0 >60.0   , CBC   Recent Labs   Lab 08/07/20  0825 08/08/20  0357   WBC 9.63 10.10   HGB 18.8* 17.1   HCT 54.1* 49.2    297   , INR No results for input(s): INR, PROTIME in the last 48 hours., Lipid Panel No results for input(s): CHOL, HDL, LDLCALC, TRIG, CHOLHDL in the last 48 hours. and Troponin   Recent Labs   Lab 08/07/20  0825 08/07/20  1302 08/07/20  1801   TROPONINI <0.030 <0.030 <0.030       Significant Imaging: Reviewed  Assessment and Plan:     IMPRESSION:  Atrial fibrillation.  Rapid ventricular response.  Paroxysmal AFib.  Currently on Cardizem GTT.  Possible obstructive sleep apnea.  Patient reports snoring, daytime fatigue and his pulse her symptom he stops breathing in the middle of the night at times.  Hypertension.  Marijuana use      PLAN:  1.  Since the patient remains in atrial fibrillation with rapid ventricular response advised the patient to have a GABRIELA/cardioversion done.  After discussion of the indications, risks, benefits as well as alternatives to the procedure in Layman terms he understands the procedure and would like to proceed.  Informed consent was obtained.        Danielle Ramirez MD  Cardiology  [facility]

## 2020-08-08 NOTE — PROGRESS NOTES
Patient discharged to home with prescriptions for cardizem and eliquis. Patient given coupons for prescriptions. Tele and IV removed. Patient educated on AFIB and the importance of his medication regimen. Patient verbalized understanding.

## 2020-08-08 NOTE — NURSING
Pt rested during the night with cardizem @ 15mg/hr to Rt AC, Afib remained on cardiac monitor HR ranged . #20G to RT Hand started @ 0620 this AM. Pt set up for GABRIELA Cardioversion this AM since pt did not convert to NSR. Pt has remained asymptomatic, denies any chest pain or sob. Family at bedside. Call light in reach, bed low and locked.

## 2020-08-08 NOTE — INTERVAL H&P NOTE
The patient has been examined and the H&P has been reviewed:    I concur with the findings and no changes have occurred since H&P was written. Patient remains in atrial fibrillation with RVR.         Active Hospital Problems    Diagnosis  POA    *Rapid atrial fibrillation [I48.91]  Yes    Chest pain [R07.9]  Yes      Resolved Hospital Problems   No resolved problems to display.

## 2020-08-08 NOTE — ANESTHESIA PREPROCEDURE EVALUATION
08/08/2020  Nabil Urias is a 35 y.o., male.    Patient Active Problem List   Diagnosis    Rapid atrial fibrillation    Chest pain       No past surgical history on file.     Tobacco Use:  The patient  reports that he has never smoked. He uses smokeless tobacco.     Results for orders placed or performed during the hospital encounter of 08/07/20   EKG 12-lead    Collection Time: 08/07/20  8:28 AM    Narrative    Test Reason : I48.91,    Vent. Rate : 191 BPM     Atrial Rate : 187 BPM     P-R Int : 000 ms          QRS Dur : 070 ms      QT Int : 256 ms       P-R-T Axes : 000 063 022 degrees     QTc Int : 456 ms    Atrial fibrillation with RVR  Otherwise normal ECG  When compared with ECG of 07-AUG-2020 08:11,  Current undetermined rhythm precludes rhythm comparison, needs review  Confirmed by Oracio Porter MD ([id number]) on 8/7/2020 5:29:47 PM    Referred By: [email or web address]   SELF           Confirmed By:Oracio Porter MD             Lab Results   Component Value Date    WBC 10.10 08/08/2020    HGB 17.1 08/08/2020    HCT 49.2 08/08/2020    MCV 89 08/08/2020     08/08/2020     BMP  Lab Results   Component Value Date     08/08/2020    K 3.8 08/08/2020     08/08/2020    CO2 23 08/08/2020    BUN 15 08/08/2020    CREATININE 1.1 08/08/2020    CALCIUM 8.6 (L) 08/08/2020    ANIONGAP 8 08/08/2020     08/08/2020     (H) 08/07/2020       Results for orders placed during the hospital encounter of 08/07/20   Echo Color Flow Doppler? Yes; Bubble Contrast? No    Narrative · The estimated PA systolic pressure is 35 mmHg.  · Mild concentric left ventricular hypertrophy.  · Normal left ventricular systolic function. The estimated ejection   fraction is 55%.  · Atrial fibrillation observed.  · Small left ventricular cavity size.  · Normal right ventricular systolic function.  · Mild tricuspid  regurgitation.  · No pulmonary hypertension present.            Pre-op Assessment    I have reviewed the Patient Summary Reports.     I have reviewed the Nursing Notes. I have reviewed the NPO Status.   I have reviewed the Medications.     Review of Systems  Anesthesia Hx:  No previous Anesthesia  Denies Family Hx of Anesthesia complications.   Denies Personal Hx of Anesthesia complications.   Social:  Smoker, No Alcohol Use Drug use: Marijuana   Hematology/Oncology:  Hematology Normal   Oncology Normal     EENT/Dental:EENT/Dental Normal   Cardiovascular:   Dysrhythmias atrial fibrillation ECG has been reviewed.    Pulmonary:  Pulmonary Normal    Renal/:  Renal/ Normal     Hepatic/GI:  Hepatic/GI Normal No active nausea vomiting  No intestinal obstruction   Musculoskeletal:  Musculoskeletal Normal    Neurological:  Neurology Normal    Endocrine:  Endocrine Normal    Dermatological:  Skin Normal    Psych:  Psychiatric Normal           Physical Exam  General:  Well nourished    Airway/Jaw/Neck:  Airway Findings: Mouth Opening: Normal Tongue: Normal  General Airway Assessment: Adult  Mallampati: III  TM Distance: < 4 cm  Jaw/Neck Findings:  Neck ROM: Normal ROM      Dental:  Dental Findings: In tact   Chest/Lungs:  Chest/Lungs Findings: Clear to auscultation, Normal Respiratory Rate     Heart/Vascular:  Heart Findings: Rate: Tachycardia  Rhythm: Irregularly Irregular        Mental Status:  Mental Status Findings:  Cooperative, Alert and Oriented         Anesthesia Plan  Type of Anesthesia, risks & benefits discussed:  Anesthesia Type:  MAC  Patient's Preference:   Intra-op Monitoring Plan: standard ASA monitors  Intra-op Monitoring Plan Comments:   Post Op Pain Control Plan: per primary service following discharge from PACU  Post Op Pain Control Plan Comments:   Induction:    Beta Blocker:  Patient is on a Beta-Blocker and has received one dose within the past 24 hours (No further documentation required).        Informed Consent: Patient understands risks and agrees with Anesthesia plan.  Questions answered. Anesthesia consent signed with patient.  ASA Score: 4     Day of Surgery Review of History & Physical:        Anesthesia Plan Notes: MAC with Propofol  P.O.M. Mask        Ready For Surgery From Anesthesia Perspective.

## 2020-08-08 NOTE — H&P
"[facility]  History & Physical    Patient Name: Nabil Urias  MRN: [id number]  Admission Date: 2020  Attending Physician: Roland Sheppard MD   Primary Care Provider: Primary Doctor No  Date of service:  2020     Patient information was obtained from patient, spouse/SO and ER records.     Subjective:     Principal Problem:Rapid atrial fibrillation    Chief Complaint:   Chief Complaint   Patient presents with    Shortness of Breath    Headache        HPI: 35-year-old gentleman with no significant past medical history who presents the hospital today with lightheadedness.  The patient's symptoms started this morning upon awakening.  He felt dizzy and lightheaded, constant timing, mild intensity.  He denied any associated chest pain or syncope.  No focal weakness, speech change, or visual change.  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  His symptoms worsen to moderate-severe intensity.  He felt episodes of near syncope described as "black over his eyes."  He reported some associated shortness of breath.  He denied any palpitations.  He denied headache, fever or chills.  No abdominal pain, nausea, vomiting, bleeding, dysuria or diarrhea.  No chest pain.  Due to worsening symptoms he presented to the emergency department for evaluation.  In the emergency department he was found to have narrow complex tachycardia @ 170-180.  He received a dose of IV adenosine.  His rhythm was identified as rapid atrial fibrillation.  Subsequently restarted on IV infusion of Cardizem.  The patient's heart rate has improved some and he reports some improvement in his symptoms at this time.  He reports drinking several cups of coffee per day but denies other stimulants.  He is anxious about leaving the hospital due to a  that he has to attend tomorrow.  The patient started to leave the emergency room against medical advice; I had a discussion with him " and his wife and he has decided to stay for admission for workup and treatment after understanding threat of stroke or death from worsening symptoms if untreated.    History reviewed. No pertinent past medical history.    History reviewed. No pertinent surgical history.    Review of patient's allergies indicates:  No Known Allergies    No current facility-administered medications on file prior to encounter.      Current Outpatient Medications on File Prior to Encounter   Medication Sig    cetirizine (ZYRTEC) 10 MG tablet Take 10 mg by mouth once daily.     Family History     Family history reviewed and noncontributory        Tobacco Use    Smoking status: Never Smoker    Smokeless tobacco: Current User   Substance and Sexual Activity    Alcohol use: Not Currently    Drug use: Yes     Types: Marijuana     Comment: Hx: Heroin (last use April 2012)    Sexual activity: Yes     Review of Systems complete 10 point review of systems negative except as per HPI  Objective:     Vital Signs (Most Recent):  Temp: 98.5 °F (36.9 °C) (08/07/20 2000)  Pulse: 95 (08/07/20 2000)  Resp: 19 (08/07/20 2000)  BP: 123/62 (08/07/20 2000)  SpO2: 97 % (08/07/20 2000) Vital Signs (24h Range):  Temp:  [96 °F (35.6 °C)-98.5 °F (36.9 °C)] 98.5 °F (36.9 °C)  Pulse:  [] 95  Resp:  [12-27] 19  SpO2:  [95 %-100 %] 97 %  BP: (101-151)/(55-91) 123/62     Weight: 107.5 kg (236 lb 15.9 oz)  Body mass index is 32.14 kg/m².    Physical Exam  General:  Comfortable appearing, nontoxic, no apparent distress, pleasant  Head and eyes:  Anicteric sclerae, no conjunctival discharge, PERRLA, EOMI  ENT:  Moist mucous membranes, no thrush  Pulmonary:  Comfortable work breathing, lungs clear auscultation bilaterally  Cardiovascular:  2+ radial pulses, irregularly irregular rhythm, tachycardic, no pedal edema, no JVD  GI:  Abdomen soft and nontender, nondistended  Skin:  Dry and warm no jaundice  Psych:  Mood is anxious, affect normal, insight  fair  Neuro:  Nonfocal motor exam, fluent speech, alert and oriented     Significant Labs:   BMP:   Recent Labs   Lab 08/07/20  0825   *      K 4.1      CO2 23   BUN 17   CREATININE 1.3   CALCIUM 9.7   MG 2.3     CBC:   Recent Labs   Lab 08/07/20  0825   WBC 9.63   HGB 18.8*   HCT 54.1*        CMP:   Recent Labs   Lab 08/07/20  0825      K 4.1      CO2 23   *   BUN 17   CREATININE 1.3   CALCIUM 9.7   PROT 8.4   ALBUMIN 4.8   BILITOT 1.4*   ALKPHOS 67   AST 28   ALT 36   ANIONGAP 12   EGFRNONAA >60.0     Cardiac Markers:   Recent Labs   Lab 08/07/20  0825   BNP 50     CTA chest impression: Negative for pulmonary embolus or other acute intrathoracic abnormality.    Assessment/Plan:     Assessment:  Rapid atrial fibrillation new onset    Plan:  Telemetry admission  Cardiology consultation  Serial troponin.  Check TSH.  Echocardiogram  IV Lovenox treatment dose.  Likely transition to oral anticoagulant next 24-48 hours.  Continuous IV Cardizem infusion.    Additional IV digoxin ordered per Cardiology  Consider GABRIELA/DCCV tomorrow  Avoid stimulants/caffeine  Serial labs  VTE prophylaxis Lovenox  High risk secondary to acute illness with risk to life    VTE Risk Mitigation (From admission, onward)         Ordered     enoxaparin injection 110 mg  Every 12 hours (non-standard times)      08/07/20 1150     IP VTE HIGH RISK PATIENT  Once      08/07/20 1149     Place sequential compression device  Until discontinued      08/07/20 1149                Roland Sheppard MD  Department of Hospital Medicine   Formerly [facility], [facility]

## 2020-08-08 NOTE — ANESTHESIA POSTPROCEDURE EVALUATION
Anesthesia Post Evaluation    Patient: Nabil Urias    Procedure(s) Performed: * No procedures listed *    Final Anesthesia Type: MAC    Patient location during evaluation: floor  Patient participation: Yes- Able to Participate  Level of consciousness: awake and alert, oriented and awake  Post-procedure vital signs: reviewed and stable  Pain management: adequate  Airway patency: patent    PONV status at discharge: No PONV  Anesthetic complications: no      Cardiovascular status: blood pressure returned to baseline, hemodynamically stable and stable  Respiratory status: unassisted, spontaneous ventilation and room air  Hydration status: euvolemic  Follow-up not needed.          Vitals Value Taken Time   /89 08/08/20 0915   Temp 36.4 °C (97.5 °F) 08/08/20 0716   Pulse 64 08/08/20 0925   Resp 22 08/08/20 0925   SpO2 99 % 08/08/20 0925   Vitals shown include unvalidated device data.      No case tracking events are documented in the log.      Pain/Vic Score: No data recorded

## 2020-08-08 NOTE — SUBJECTIVE & OBJECTIVE
History reviewed. No pertinent past medical history.    History reviewed. No pertinent surgical history.    Review of patient's allergies indicates:  No Known Allergies    No current facility-administered medications on file prior to encounter.      Current Outpatient Medications on File Prior to Encounter   Medication Sig    cetirizine (ZYRTEC) 10 MG tablet Take 10 mg by mouth once daily.     Family History     Family history reviewed and noncontributory        Tobacco Use    Smoking status: Never Smoker    Smokeless tobacco: Current User   Substance and Sexual Activity    Alcohol use: Not Currently    Drug use: Yes     Types: Marijuana     Comment: Hx: Heroin (last use April 2012)    Sexual activity: Yes     Review of Systems complete 10 point review of systems negative except as per HPI  Objective:     Vital Signs (Most Recent):  Temp: 98.5 °F (36.9 °C) (08/07/20 2000)  Pulse: 95 (08/07/20 2000)  Resp: 19 (08/07/20 2000)  BP: 123/62 (08/07/20 2000)  SpO2: 97 % (08/07/20 2000) Vital Signs (24h Range):  Temp:  [96 °F (35.6 °C)-98.5 °F (36.9 °C)] 98.5 °F (36.9 °C)  Pulse:  [] 95  Resp:  [12-27] 19  SpO2:  [95 %-100 %] 97 %  BP: (101-151)/(55-91) 123/62     Weight: 107.5 kg (236 lb 15.9 oz)  Body mass index is 32.14 kg/m².    Physical Exam  General:  Comfortable appearing, nontoxic, no apparent distress, pleasant  Head and eyes:  Anicteric sclerae, no conjunctival discharge, PERRLA, EOMI  ENT:  Moist mucous membranes, no thrush  Pulmonary:  Comfortable work breathing, lungs clear auscultation bilaterally  Cardiovascular:  2+ radial pulses, irregularly irregular rhythm, tachycardic, no pedal edema, no JVD  GI:  Abdomen soft and nontender, nondistended  Skin:  Dry and warm no jaundice  Psych:  Mood is anxious, affect normal, insight fair  Neuro:  Nonfocal motor exam, fluent speech, alert and oriented     Significant Labs:   BMP:   Recent Labs   Lab 08/07/20  0825   *      K 4.1      CO2  23   BUN 17   CREATININE 1.3   CALCIUM 9.7   MG 2.3     CBC:   Recent Labs   Lab 08/07/20  0825   WBC 9.63   HGB 18.8*   HCT 54.1*        CMP:   Recent Labs   Lab 08/07/20  0825      K 4.1      CO2 23   *   BUN 17   CREATININE 1.3   CALCIUM 9.7   PROT 8.4   ALBUMIN 4.8   BILITOT 1.4*   ALKPHOS 67   AST 28   ALT 36   ANIONGAP 12   EGFRNONAA >60.0     Cardiac Markers:   Recent Labs   Lab 08/07/20  0825   BNP 50

## 2020-08-08 NOTE — PLAN OF CARE
Patient being discharged to home on cardizem and eliquis.  discussed with patient about medications.   Problem: Adult Inpatient Plan of Care  Goal: Plan of Care Review  Outcome: Met  Goal: Patient-Specific Goal (Individualization)  Outcome: Met  Goal: Absence of Hospital-Acquired Illness or Injury  Outcome: Met  Goal: Optimal Comfort and Wellbeing  Outcome: Met  Goal: Readiness for Transition of Care  Outcome: Met  Goal: Rounds/Family Conference  Outcome: Met     Problem: Fall Injury Risk  Goal: Absence of Fall and Fall-Related Injury  Outcome: Met     Problem: Adjustment to Illness (Cardiac Rhythm Management Device)  Goal: Optimal Adjustment to Device Presence  Outcome: Met     Problem: Bleeding (Cardiac Rhythm Management Device)  Goal: Absence of Bleeding  Outcome: Met     Problem: Device-Related Complication Risk (Cardiac Rhythm Management Device)  Goal: Effective Device Function  Outcome: Met     Problem: Infection (Cardiac Rhythm Management Device)  Goal: Absence of Infection Signs/Symptoms  Outcome: Met     Problem: Pain (Cardiac Rhythm Management Device)  Goal: Acceptable Pain Level  Outcome: Met     Problem: Respiratory Compromise (Cardiac Rhythm Management Device)  Goal: Effective Respiratory Effort and Oxygenation  Outcome: Met

## 2020-08-08 NOTE — TRANSFER OF CARE
Anesthesia Transfer of Care Note    Patient: Nabil RODRIGUEZ [place]    Procedure(s) Performed: * No procedures listed *    Patient location: Other: [facility]    Anesthesia Type: MAC    Post pain: adequate analgesia    Post assessment: no apparent anesthetic complications    Post vital signs: stable    Level of consciousness: awake, alert and oriented    Nausea/Vomiting: no nausea/vomiting    Complications: none    Transfer of care protocol was followed      Last vitals:   Visit Vitals  /78 (BP Location: Left arm, Patient Position: Lying)   Pulse (!) 116   Temp 36.4 °C (97.5 °F) (Oral)   Resp (!) 26   Ht 6' (1.829 m)   Wt 106.9 kg (235 lb 10.8 oz)   SpO2 97%   BMI 31.96 kg/m²

## 2020-08-08 NOTE — CONSULTS
SW responding to consult for prescription affordability assistance. SW presented pt with 30 day free trial card for Eliquis and discussed using good rx for any additional meds. SW to refer to [facility] for insurance eligibility assistance. Pt v/u and stated that with these resources he did not have concerns about being able to afford medications. No further needs addressed at this time.

## 2020-08-08 NOTE — HPI
"35-year-old gentleman with no significant past medical history who presents the hospital today with lightheadedness.  The patient's symptoms started this morning upon awakening.  He felt dizzy and lightheaded, constant timing, mild intensity.  He denied any associated chest pain or syncope.  No focal weakness, speech change, or visual change.  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  His symptoms worsen to moderate-severe intensity.  He felt episodes of near syncope described as "black over his eyes."  He reported some associated shortness of breath.  He denied any palpitations.  He denied headache, fever or chills.  No abdominal pain, nausea, vomiting, bleeding, dysuria or diarrhea.  No chest pain.  Due to worsening symptoms he presented to the emergency department for evaluation.  In the emergency department he was found to have narrow complex tachycardia @ 170-180.  He received a dose of IV adenosine.  His rhythm was identified as rapid atrial fibrillation.  Subsequently restarted on IV infusion of Cardizem.  The patient's heart rate has improved some and he reports some improvement in his symptoms at this time.  He reports drinking several cups of coffee per day but denies other stimulants.  He is anxious about leaving the hospital due to a  that he has to attend tomorrow.  The patient started to leave the emergency room against medical advice; I had a discussion with him and his wife and he has decided to stay for admission for workup and treatment after understanding threat of stroke or death from worsening symptoms if untreated.  "

## 2020-08-08 NOTE — DISCHARGE SUMMARY
"[facility]  Discharge Summary      Patient Name: Nabil Urias  MRN: [id number]  Admission Date: 2020  Discharge Date and Time:  2020 2:41 PM  Discharging Provider: Roland Sheppard MD  Primary Care Provider: Primary Doctor No    HPI:   35-year-old gentleman with no significant past medical history who presents the hospital today with lightheadedness.  The patient's symptoms started this morning upon awakening.  He felt dizzy and lightheaded, constant timing, mild intensity.  He denied any associated chest pain or syncope.  No focal weakness, speech change, or visual change.  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  His symptoms worsen to moderate-severe intensity.  He felt episodes of near syncope described as "black over his eyes."  He reported some associated shortness of breath.  He denied any palpitations.  He denied headache, fever or chills.  No abdominal pain, nausea, vomiting, bleeding, dysuria or diarrhea.  No chest pain.  Due to worsening symptoms he presented to the emergency department for evaluation.  In the emergency department he was found to have narrow complex tachycardia @ 170-180.  He received a dose of IV adenosine.  His rhythm was identified as rapid atrial fibrillation.  Subsequently restarted on IV infusion of Cardizem.  The patient's heart rate has improved some and he reports some improvement in his symptoms at this time.  He reports drinking several cups of coffee per day but denies other stimulants.  He is anxious about leaving the hospital due to a  that he has to attend tomorrow.  The patient started to leave the emergency room against medical advice; I had a discussion with him and his wife and he has decided to stay for admission for workup and treatment after understanding threat of stroke or death from worsening symptoms if untreated.    Hospital Course:   The patient was admitted to the hospital " for workup and treatment including continuous IV Cardizem and Lovenox anticoagulation.  Cardiology consultation was obtained.  The patient required multiple doses of supplemental medications with poor heart rate control despite aggressive IV ruben blockers.  Subsequent the patient underwent GABRIELA/DCCV.  The patient did well in the postprocedural course.  The patient maintained normal sinus rhythm.  The patient was transitioned to oral Eliquis and Cardizem.  He underwent CTA chest which was negative for pulmonary embolus.  He had echocardiogram pending final report at time of discharge.  I discussed the plan with Cardiology prior to discharge.  At this point the patient is much improved and medically stable for discharge home.  The patient is requesting discharge because he feels well.  The patient will follow up as outpatient with Cardiology.  He was educated on the importance of avoidance of stimulants including caffeine/coffee.  The patient is in understanding and agreement with the discharge plan today.    CTA chest impression: Negative for pulmonary embolus or other acute intrathoracic abnormality.     Consults:   Consults (From admission, onward)        Status Ordering Provider     Inpatient consult to Anesthesiology  Once     Provider:  (Not yet assigned)    Acknowledged DANIELLE TIDWELL     Inpatient consult to Cardiology  Once     Provider:  Danielle Tidwell MD    Completed KENDRA SPARKS     Inpatient consult to   Once     Provider:  (Not yet assigned)    Completed DANIELLE TIDWELL        Discharge diagnoses:  New onset rapid atrial fibrillation status post cardioversion to normal sinus rhythm    Discharge physical exam:  General:  Comfortable appearing, nontoxic, no apparent distress, pleasant  ENT:  Moist mucous membranes  Pulmonary:  Comfortable work breathing, lungs clear auscultation bilaterally  Cardiovascular:  2+ radial pulses, regular rate and  rhythm    Discharged Condition: stable    Disposition: Home or Self Care    Follow Up:  Follow-up Information     Danielle Ramirez MD In 2 weeks.    Specialties: Cardiovascular Disease, Cardiology, INTERVENTIONAL CARDIOLOGY  Contact information:  [address]  [facility] [id number] [phone]                 Patient Instructions:      Diet Adult Regular   Order Comments: Avoid stimulants/coffee     Notify your health care provider if you experience any of the following:  severe uncontrolled pain     Notify your health care provider if you experience any of the following:  difficulty breathing or increased cough     Notify your health care provider if you experience any of the following:  increased confusion or weakness     Activity as tolerated       Pending Diagnostic Studies:     Procedure Component Value Units Date/Time    Transesophageal echo (GABRIELA) with possible cardioversion [[id number]] Resulted: 08/08/20 0840    Order Status: Sent Lab Status: In process Updated: 08/08/20 0841     BSA 2.33 m2          Medications:  Reconciled Home Medications:      Medication List      START taking these medications    apixaban 5 mg Tab  Commonly known as: ELIQUIS  Take 1 tablet (5 mg total) by mouth 2 (two) times daily.     diltiaZEM 120 MG Cp24  Commonly known as: CARDIZEM CD  Take 1 capsule (120 mg total) by mouth once daily.  Start taking on: August 9, 2020        CONTINUE taking these medications    cetirizine 10 MG tablet  Commonly known as: ZYRTEC  Take 10 mg by mouth once daily.            Indwelling Lines/Drains at time of discharge:   Lines/Drains/Airways     None                 Time spent on the discharge of patient: 32 minutes  Patient was seen and examined on the date of discharge and determined to be suitable for discharge.         Roland Sheppard MD  Department of Hospital Medicine  [facility]

## 2020-08-24 LAB — BSA FOR ECHO PROCEDURE: 2.33 M2

## 2020-09-02 ENCOUNTER — OFFICE VISIT (OUTPATIENT)
Dept: CARDIOLOGY | Facility: CLINIC | Age: 35
End: 2020-09-02
Payer: MEDICAID

## 2020-09-02 VITALS
DIASTOLIC BLOOD PRESSURE: 80 MMHG | OXYGEN SATURATION: 97 % | HEIGHT: 72 IN | RESPIRATION RATE: 16 BRPM | WEIGHT: 238 LBS | SYSTOLIC BLOOD PRESSURE: 130 MMHG | HEART RATE: 92 BPM | BODY MASS INDEX: 32.23 KG/M2

## 2020-09-02 DIAGNOSIS — I48.0 PAROXYSMAL ATRIAL FIBRILLATION: Primary | ICD-10-CM

## 2020-09-02 DIAGNOSIS — R73.03 PREDIABETES: ICD-10-CM

## 2020-09-02 DIAGNOSIS — R06.83 SNORING: ICD-10-CM

## 2020-09-02 DIAGNOSIS — E78.5 DYSLIPIDEMIA: ICD-10-CM

## 2020-09-02 DIAGNOSIS — I10 ESSENTIAL HYPERTENSION: ICD-10-CM

## 2020-09-02 PROCEDURE — 99214 OFFICE O/P EST MOD 30 MIN: CPT | Mod: S$GLB,,, | Performed by: INTERNAL MEDICINE

## 2020-09-02 PROCEDURE — 99214 PR OFFICE/OUTPT VISIT, EST, LEVL IV, 30-39 MIN: ICD-10-PCS | Mod: S$GLB,,, | Performed by: INTERNAL MEDICINE

## 2020-09-02 RX ORDER — DILTIAZEM HYDROCHLORIDE EXTENDED-RELEASE TABLETS 120 MG/1
120 TABLET, EXTENDED RELEASE ORAL DAILY
COMMUNITY
End: 2021-02-10 | Stop reason: SDUPTHER

## 2020-09-02 RX ORDER — SIMVASTATIN 10 MG/1
10 TABLET, FILM COATED ORAL NIGHTLY
Status: ON HOLD | COMMUNITY
End: 2020-11-08 | Stop reason: ALTCHOICE

## 2020-09-02 NOTE — PROGRESS NOTES
Subjective:    Patient ID:  Nabil Urias is a 35 y.o. male     HPI   Patient presents to the clinic today for follow-up after he was discharged from the hospital for atrial fibrillation.  Was initially seen in the hospital he presented with atrial fibrillation with rapid response.  At that time he was cardioverted and was started on Eliquis.  Since discharge she has been doing fine.  Has not had any bad palpitations that he had at the time of admission.  He occasionally has some palpitations with a sensation of strong heartbeat that is lasts a couple seconds and spontaneously resolves particularly when he is stressed out or very angry.  He denies any chest pain.  He denies any lightheadedness, dizziness or any syncopal episodes.  He has had minor bleeding issues which are thought to be secondary to hemorrhoids.  He is following up with his primary care physician for the same.    Social History     Socioeconomic History    Marital status:      Spouse name: Not on file    Number of children: Not on file    Years of education: Not on file    Highest education level: Not on file   Occupational History    Not on file   Social Needs    Financial resource strain: Not on file    Food insecurity     Worry: Not on file     Inability: Not on file    Transportation needs     Medical: Not on file     Non-medical: Not on file   Tobacco Use    Smoking status: Never Smoker    Smokeless tobacco: Current User     Types: Snuff   Substance and Sexual Activity    Alcohol use: Yes     Alcohol/week: 4.0 standard drinks     Types: 4 Cans of beer per week     Frequency: 2-4 times a month     Drinks per session: 3 or 4     Binge frequency: Less than monthly    Drug use: Yes     Frequency: 1.0 times per week     Types: Marijuana    Sexual activity: Yes     Partners: Female   Lifestyle    Physical activity     Days per week: Not on file     Minutes per session: Not on file    Stress: Not on file   Relationships    Social  connections     Talks on phone: Not on file     Gets together: Not on file     Attends Mosque service: Not on file     Active member of club or organization: Not on file     Attends meetings of clubs or organizations: Not on file     Relationship status: Not on file   Other Topics Concern    Not on file   Social History Narrative    Not on file        Family History   Problem Relation Age of Onset    Heart attack Maternal Grandfather           Current Outpatient Medications   Medication Instructions    apixaban (ELIQUIS ORAL) 5 mg, Oral, 2 times daily    diltiaZEM (CARDIZEM LA) 120 mg, Oral, Daily    simvastatin (ZOCOR) 10 mg, Oral, Nightly        Review of Systems   Constitution: Positive for malaise/fatigue. Negative for decreased appetite, fever, weight gain and weight loss.   HENT: Negative for ear pain and sore throat.    Eyes: Negative for double vision and pain.   Respiratory: Positive for snoring. Negative for cough and hemoptysis.    Endocrine: Negative for heat intolerance and polydipsia.   Hematologic/Lymphatic: Negative for bleeding problem.   Skin: Negative for rash.   Musculoskeletal: Negative for stiffness.   Gastrointestinal: Negative for abdominal pain, jaundice and vomiting.   Genitourinary: Negative for dysuria.   Neurological: Negative for seizures and tremors.   Psychiatric/Behavioral: Negative for altered mental status, hallucinations and suicidal ideas.        Objective:     Vitals:    09/02/20 1032   BP: 130/80   BP Location: Left arm   Patient Position: Sitting   BP Method: Large (Manual)   Pulse: 92   Resp: 16   SpO2: 97%   Weight: 108 kg (238 lb)   Height: 6' (1.829 m)       Physical Exam   Constitutional: He is oriented to person, place, and time. He appears well-developed and well-nourished.   HENT:   Head: Normocephalic and atraumatic.   Eyes: Pupils are equal, round, and reactive to light. Conjunctivae are normal.   Neck: Neck supple. No JVD present.   Cardiovascular: Normal  rate, regular rhythm, S1 normal, S2 normal and normal heart sounds. Exam reveals no gallop and no friction rub.   No murmur heard.  Pulses:       Carotid pulses are 2+ on the right side and 2+ on the left side.       Posterior tibial pulses are 2+ on the right side and 2+ on the left side.   Pulmonary/Chest: No stridor. No respiratory distress. He has no wheezes. He has no rales.   Abdominal: Soft. He exhibits no distension. There is no abdominal tenderness.   Musculoskeletal:         General: No edema.   Neurological: He is alert and oriented to person, place, and time.   Skin: Skin is warm and dry. No burn and no rash noted. No cyanosis. Nails show no clubbing.   Psychiatric: His behavior is normal. He expresses no suicidal ideation.     No results found for this or any previous visit.  No results found for this or any previous visit.       Assessment:       Problem List Items Addressed This Visit     None      Visit Diagnoses     Paroxysmal atrial fibrillation    -  Primary    Initial Dx: 8/2020. Nonvalvular.     Snoring        Essential hypertension        Dyslipidemia        Prediabetes                Plan:       1.  Continue current medical regimen without any changes.  2.  Return to the clinic in about 3 months or sooner if needed.  3.  Obtain a sleep study to screen for obstructive sleep apnea in view of the fatigued, snoring and multiple other problems that may be resultant from obstructive sleep apnea

## 2020-11-07 ENCOUNTER — HOSPITAL ENCOUNTER (INPATIENT)
Facility: HOSPITAL | Age: 35
LOS: 3 days | Discharge: HOME | DRG: 310 | End: 2020-11-10
Attending: EMERGENCY MEDICINE | Admitting: INTERNAL MEDICINE
Payer: MEDICAID

## 2020-11-07 DIAGNOSIS — I48.91 ATRIAL FIBRILLATION WITH RVR: ICD-10-CM

## 2020-11-07 DIAGNOSIS — R06.02 SHORTNESS OF BREATH: ICD-10-CM

## 2020-11-07 DIAGNOSIS — I48.0 PAROXYSMAL ATRIAL FIBRILLATION: ICD-10-CM

## 2020-11-07 DIAGNOSIS — I48.91 ATRIAL FIBRILLATION WITH RAPID VENTRICULAR RESPONSE: Primary | ICD-10-CM

## 2020-11-07 DIAGNOSIS — R07.9 CHEST PAIN: ICD-10-CM

## 2020-11-07 LAB
ALBUMIN SERPL BCP-MCNC: 4.9 G/DL (ref 3.5–5.2)
ALP SERPL-CCNC: 81 U/L (ref 55–135)
ALT SERPL W/O P-5'-P-CCNC: 28 U/L (ref 10–44)
ANION GAP SERPL CALC-SCNC: 11 MMOL/L (ref 8–16)
AST SERPL-CCNC: 25 U/L (ref 10–40)
BASOPHILS # BLD AUTO: 0.13 K/UL (ref 0–0.2)
BASOPHILS NFR BLD: 0.9 % (ref 0–1.9)
BILIRUB SERPL-MCNC: 1.2 MG/DL (ref 0.1–1)
BNP SERPL-MCNC: 20 PG/ML (ref 0–99)
BNP SERPL-MCNC: 20 PG/ML (ref 0–99)
BUN SERPL-MCNC: 20 MG/DL (ref 6–20)
CALCIUM SERPL-MCNC: 9.9 MG/DL (ref 8.7–10.5)
CHLORIDE SERPL-SCNC: 102 MMOL/L (ref 95–110)
CO2 SERPL-SCNC: 30 MMOL/L (ref 23–29)
CREAT SERPL-MCNC: 1.4 MG/DL (ref 0.5–1.4)
DIFFERENTIAL METHOD: ABNORMAL
EOSINOPHIL # BLD AUTO: 0.8 K/UL (ref 0–0.5)
EOSINOPHIL NFR BLD: 5.6 % (ref 0–8)
ERYTHROCYTE [DISTWIDTH] IN BLOOD BY AUTOMATED COUNT: 12.2 % (ref 11.5–14.5)
EST. GFR  (AFRICAN AMERICAN): >60 ML/MIN/1.73 M^2
EST. GFR  (NON AFRICAN AMERICAN): >60 ML/MIN/1.73 M^2
GLUCOSE SERPL-MCNC: 102 MG/DL (ref 70–110)
HCT VFR BLD AUTO: 50.3 % (ref 40–54)
HGB BLD-MCNC: 17.3 G/DL (ref 14–18)
IMM GRANULOCYTES # BLD AUTO: 0.08 K/UL (ref 0–0.04)
IMM GRANULOCYTES NFR BLD AUTO: 0.6 % (ref 0–0.5)
INR PPP: 1
LYMPHOCYTES # BLD AUTO: 5.4 K/UL (ref 1–4.8)
LYMPHOCYTES NFR BLD: 37.3 % (ref 18–48)
MAGNESIUM SERPL-MCNC: 2.1 MG/DL (ref 1.6–2.6)
MCH RBC QN AUTO: 31.6 PG (ref 27–31)
MCHC RBC AUTO-ENTMCNC: 34.4 G/DL (ref 32–36)
MCV RBC AUTO: 92 FL (ref 82–98)
MONOCYTES # BLD AUTO: 1.3 K/UL (ref 0.3–1)
MONOCYTES NFR BLD: 8.8 % (ref 4–15)
NEUTROPHILS # BLD AUTO: 6.7 K/UL (ref 1.8–7.7)
NEUTROPHILS NFR BLD: 46.8 % (ref 38–73)
NRBC BLD-RTO: 0 /100 WBC
PLATELET # BLD AUTO: 302 K/UL (ref 150–350)
PMV BLD AUTO: 10.8 FL (ref 9.2–12.9)
POTASSIUM SERPL-SCNC: 3.8 MMOL/L (ref 3.5–5.1)
PROT SERPL-MCNC: 7.7 G/DL (ref 6–8.4)
PROTHROMBIN TIME: 12.6 SEC (ref 10.6–14.8)
RBC # BLD AUTO: 5.47 M/UL (ref 4.6–6.2)
SARS-COV-2 RDRP RESP QL NAA+PROBE: NEGATIVE
SODIUM SERPL-SCNC: 143 MMOL/L (ref 136–145)
TROPONIN I SERPL DL<=0.01 NG/ML-MCNC: <0.03 NG/ML
TSH SERPL DL<=0.005 MIU/L-ACNC: 1.79 UIU/ML (ref 0.34–5.6)
WBC # BLD AUTO: 14.41 K/UL (ref 3.9–12.7)

## 2020-11-07 PROCEDURE — U0002 COVID-19 LAB TEST NON-CDC: HCPCS

## 2020-11-07 PROCEDURE — 25000003 PHARM REV CODE 250: Performed by: EMERGENCY MEDICINE

## 2020-11-07 PROCEDURE — 85610 PROTHROMBIN TIME: CPT

## 2020-11-07 PROCEDURE — 84484 ASSAY OF TROPONIN QUANT: CPT

## 2020-11-07 PROCEDURE — 93010 EKG 12-LEAD: ICD-10-PCS | Mod: ,,, | Performed by: INTERNAL MEDICINE

## 2020-11-07 PROCEDURE — 93005 ELECTROCARDIOGRAM TRACING: CPT | Performed by: INTERNAL MEDICINE

## 2020-11-07 PROCEDURE — 96374 THER/PROPH/DIAG INJ IV PUSH: CPT

## 2020-11-07 PROCEDURE — 83880 ASSAY OF NATRIURETIC PEPTIDE: CPT

## 2020-11-07 PROCEDURE — 93010 ELECTROCARDIOGRAM REPORT: CPT | Mod: ,,, | Performed by: INTERNAL MEDICINE

## 2020-11-07 PROCEDURE — 80053 COMPREHEN METABOLIC PANEL: CPT

## 2020-11-07 PROCEDURE — 83735 ASSAY OF MAGNESIUM: CPT

## 2020-11-07 PROCEDURE — 96361 HYDRATE IV INFUSION ADD-ON: CPT

## 2020-11-07 PROCEDURE — 21000000 HC CCU ICU ROOM CHARGE

## 2020-11-07 PROCEDURE — 85025 COMPLETE CBC W/AUTO DIFF WBC: CPT

## 2020-11-07 PROCEDURE — 84443 ASSAY THYROID STIM HORMONE: CPT

## 2020-11-07 PROCEDURE — 99291 CRITICAL CARE FIRST HOUR: CPT

## 2020-11-07 RX ORDER — DILTIAZEM HCL 1 MG/ML
5 INJECTION, SOLUTION INTRAVENOUS CONTINUOUS
Status: DISCONTINUED | OUTPATIENT
Start: 2020-11-07 | End: 2020-11-10 | Stop reason: HOSPADM

## 2020-11-07 RX ORDER — ASPIRIN 325 MG
325 TABLET ORAL
Status: COMPLETED | OUTPATIENT
Start: 2020-11-07 | End: 2020-11-07

## 2020-11-07 RX ORDER — DILTIAZEM HYDROCHLORIDE 5 MG/ML
10 INJECTION INTRAVENOUS
Status: COMPLETED | OUTPATIENT
Start: 2020-11-07 | End: 2020-11-07

## 2020-11-07 RX ADMIN — ASPIRIN 325 MG ORAL TABLET 325 MG: 325 PILL ORAL at 09:11

## 2020-11-07 RX ADMIN — SODIUM CHLORIDE 500 ML: 0.9 INJECTION, SOLUTION INTRAVENOUS at 09:11

## 2020-11-07 RX ADMIN — DILTIAZEM HYDROCHLORIDE 15 MG: 5 INJECTION INTRAVENOUS at 09:11

## 2020-11-07 RX ADMIN — DILTIAZEM HYDROCHLORIDE 10 MG/HR: 5 INJECTION INTRAVENOUS at 09:11

## 2020-11-08 LAB
ANION GAP SERPL CALC-SCNC: 13 MMOL/L (ref 8–16)
BUN SERPL-MCNC: 20 MG/DL (ref 6–20)
CALCIUM SERPL-MCNC: 9.1 MG/DL (ref 8.7–10.5)
CHLORIDE SERPL-SCNC: 100 MMOL/L (ref 95–110)
CO2 SERPL-SCNC: 25 MMOL/L (ref 23–29)
CREAT SERPL-MCNC: 1.1 MG/DL (ref 0.5–1.4)
EST. GFR  (AFRICAN AMERICAN): >60 ML/MIN/1.73 M^2
EST. GFR  (NON AFRICAN AMERICAN): >60 ML/MIN/1.73 M^2
GLUCOSE SERPL-MCNC: 118 MG/DL (ref 70–110)
MAGNESIUM SERPL-MCNC: 1.9 MG/DL (ref 1.6–2.6)
POTASSIUM SERPL-SCNC: 3.7 MMOL/L (ref 3.5–5.1)
SODIUM SERPL-SCNC: 138 MMOL/L (ref 136–145)
TROPONIN I SERPL DL<=0.01 NG/ML-MCNC: <0.03 NG/ML
TROPONIN I SERPL DL<=0.01 NG/ML-MCNC: <0.03 NG/ML

## 2020-11-08 PROCEDURE — 99232 SBSQ HOSP IP/OBS MODERATE 35: CPT | Mod: ,,, | Performed by: INTERNAL MEDICINE

## 2020-11-08 PROCEDURE — 36415 COLL VENOUS BLD VENIPUNCTURE: CPT

## 2020-11-08 PROCEDURE — 25000003 PHARM REV CODE 250: Performed by: INTERNAL MEDICINE

## 2020-11-08 PROCEDURE — 83735 ASSAY OF MAGNESIUM: CPT

## 2020-11-08 PROCEDURE — 94761 N-INVAS EAR/PLS OXIMETRY MLT: CPT

## 2020-11-08 PROCEDURE — 99232 PR SUBSEQUENT HOSPITAL CARE,LEVL II: ICD-10-PCS | Mod: ,,, | Performed by: INTERNAL MEDICINE

## 2020-11-08 PROCEDURE — 84484 ASSAY OF TROPONIN QUANT: CPT

## 2020-11-08 PROCEDURE — 21000000 HC CCU ICU ROOM CHARGE

## 2020-11-08 PROCEDURE — 80048 BASIC METABOLIC PNL TOTAL CA: CPT

## 2020-11-08 PROCEDURE — 99900035 HC TECH TIME PER 15 MIN (STAT)

## 2020-11-08 RX ORDER — POTASSIUM CHLORIDE 7.45 MG/ML
20 INJECTION INTRAVENOUS
Status: DISCONTINUED | OUTPATIENT
Start: 2020-11-08 | End: 2020-11-10 | Stop reason: HOSPADM

## 2020-11-08 RX ORDER — IBUPROFEN 200 MG
24 TABLET ORAL
Status: DISCONTINUED | OUTPATIENT
Start: 2020-11-08 | End: 2020-11-10 | Stop reason: HOSPADM

## 2020-11-08 RX ORDER — POTASSIUM CHLORIDE 20 MEQ/1
40 TABLET, EXTENDED RELEASE ORAL
Status: DISCONTINUED | OUTPATIENT
Start: 2020-11-08 | End: 2020-11-10 | Stop reason: HOSPADM

## 2020-11-08 RX ORDER — POTASSIUM CHLORIDE 7.45 MG/ML
40 INJECTION INTRAVENOUS
Status: DISCONTINUED | OUTPATIENT
Start: 2020-11-08 | End: 2020-11-10 | Stop reason: HOSPADM

## 2020-11-08 RX ORDER — CYCLOBENZAPRINE HCL 5 MG
5 TABLET ORAL 3 TIMES DAILY PRN
Status: ON HOLD | COMMUNITY
End: 2021-01-21 | Stop reason: HOSPADM

## 2020-11-08 RX ORDER — LANOLIN ALCOHOL/MO/W.PET/CERES
800 CREAM (GRAM) TOPICAL
Status: DISCONTINUED | OUTPATIENT
Start: 2020-11-08 | End: 2020-11-10 | Stop reason: HOSPADM

## 2020-11-08 RX ORDER — DILTIAZEM HYDROCHLORIDE 120 MG/1
120 CAPSULE, COATED, EXTENDED RELEASE ORAL DAILY
Status: DISCONTINUED | OUTPATIENT
Start: 2020-11-08 | End: 2020-11-10 | Stop reason: HOSPADM

## 2020-11-08 RX ORDER — SODIUM CHLORIDE 0.9 % (FLUSH) 0.9 %
10 SYRINGE (ML) INJECTION
Status: DISCONTINUED | OUTPATIENT
Start: 2020-11-08 | End: 2020-11-10 | Stop reason: HOSPADM

## 2020-11-08 RX ORDER — MAGNESIUM SULFATE HEPTAHYDRATE 40 MG/ML
2 INJECTION, SOLUTION INTRAVENOUS
Status: DISCONTINUED | OUTPATIENT
Start: 2020-11-08 | End: 2020-11-10 | Stop reason: HOSPADM

## 2020-11-08 RX ORDER — IBUPROFEN 200 MG
16 TABLET ORAL
Status: DISCONTINUED | OUTPATIENT
Start: 2020-11-08 | End: 2020-11-10 | Stop reason: HOSPADM

## 2020-11-08 RX ORDER — GLUCAGON 1 MG
1 KIT INJECTION
Status: DISCONTINUED | OUTPATIENT
Start: 2020-11-08 | End: 2020-11-10 | Stop reason: HOSPADM

## 2020-11-08 RX ORDER — TALC
6 POWDER (GRAM) TOPICAL NIGHTLY PRN
Status: DISCONTINUED | OUTPATIENT
Start: 2020-11-08 | End: 2020-11-10 | Stop reason: HOSPADM

## 2020-11-08 RX ORDER — POTASSIUM CHLORIDE 20 MEQ/1
20 TABLET, EXTENDED RELEASE ORAL
Status: DISCONTINUED | OUTPATIENT
Start: 2020-11-08 | End: 2020-11-10 | Stop reason: HOSPADM

## 2020-11-08 RX ORDER — ONDANSETRON 2 MG/ML
4 INJECTION INTRAMUSCULAR; INTRAVENOUS EVERY 8 HOURS PRN
Status: DISCONTINUED | OUTPATIENT
Start: 2020-11-08 | End: 2020-11-10 | Stop reason: HOSPADM

## 2020-11-08 RX ORDER — POLYETHYLENE GLYCOL 3350 17 G/17G
17 POWDER, FOR SOLUTION ORAL 2 TIMES DAILY PRN
Status: DISCONTINUED | OUTPATIENT
Start: 2020-11-08 | End: 2020-11-10 | Stop reason: HOSPADM

## 2020-11-08 RX ORDER — POTASSIUM CHLORIDE 20 MEQ/1
40 TABLET, EXTENDED RELEASE ORAL ONCE
Status: COMPLETED | OUTPATIENT
Start: 2020-11-08 | End: 2020-11-08

## 2020-11-08 RX ORDER — SIMVASTATIN 10 MG/1
10 TABLET, FILM COATED ORAL NIGHTLY
Status: DISCONTINUED | OUTPATIENT
Start: 2020-11-08 | End: 2020-11-08

## 2020-11-08 RX ORDER — ACETAMINOPHEN 325 MG/1
650 TABLET ORAL EVERY 4 HOURS PRN
Status: DISCONTINUED | OUTPATIENT
Start: 2020-11-08 | End: 2020-11-10 | Stop reason: HOSPADM

## 2020-11-08 RX ORDER — MAGNESIUM SULFATE 1 G/100ML
1 INJECTION INTRAVENOUS
Status: DISCONTINUED | OUTPATIENT
Start: 2020-11-08 | End: 2020-11-10 | Stop reason: HOSPADM

## 2020-11-08 RX ORDER — MAGNESIUM SULFATE HEPTAHYDRATE 40 MG/ML
4 INJECTION, SOLUTION INTRAVENOUS
Status: DISCONTINUED | OUTPATIENT
Start: 2020-11-08 | End: 2020-11-10 | Stop reason: HOSPADM

## 2020-11-08 RX ADMIN — POTASSIUM CHLORIDE 40 MEQ: 20 TABLET, EXTENDED RELEASE ORAL at 01:11

## 2020-11-08 RX ADMIN — DILTIAZEM HYDROCHLORIDE 10 MG/HR: 5 INJECTION INTRAVENOUS at 05:11

## 2020-11-08 RX ADMIN — APIXABAN 5 MG: 5 TABLET, FILM COATED ORAL at 09:11

## 2020-11-08 RX ADMIN — DILTIAZEM HYDROCHLORIDE 5 MG/HR: 5 INJECTION INTRAVENOUS at 05:11

## 2020-11-08 RX ADMIN — DILTIAZEM HYDROCHLORIDE 120 MG: 120 CAPSULE, COATED, EXTENDED RELEASE ORAL at 10:11

## 2020-11-08 NOTE — NURSING
Pt arrived to unit in NADN. Attached to bedside monitor, IV cardizem gtt infusing at 15 mg/hr. Will continue to monitor. Pt kept NPO MN.

## 2020-11-08 NOTE — HPI
35 year old male with PMHx parox afib on Cardizem po and Eliquis presents with palpitations that started at 3pm today.  He noticed mild symptoms in his chest and abdomen initially and thought perhaps it was gas.  Symptoms were intermittent.  Later, while in bed, he noted more severe and persistent palpitations.  When he got up to let the dogs out, he really felt poorly and decided to come to the ED.  He sees Dr. Ramirez, Cardiology, in office.  Reports mostly compliant with medication and has not recently missed his medication. Denies lightheadedness, SOB.

## 2020-11-08 NOTE — PLAN OF CARE
This note also relates to the following rows which could not be included:  SpO2 - Cannot attach notes to unvalidated device data  Pulse - Cannot attach notes to unvalidated device data  Resp - Cannot attach notes to unvalidated device data       11/08/20 0759   PRE-TX-O2   O2 Device (Oxygen Therapy) room air   Pulse Oximetry Type Continuous   $ Pulse Oximetry - Multiple Charge Pulse Oximetry - Multiple   Respiratory Evaluation   $ Care Plan Tech Time 15 min

## 2020-11-08 NOTE — ASSESSMENT & PLAN NOTE
Patient being admitted inpatient for  on presentation that has not yet improved on cardizem gtt started in the ED.  Titrating cardizem gtt  Continuing his home po cardizem  Anticoagulation with Eliquis  Trending troponin for completeness sakes  Cardiology consulted  Tele monitoring  Replacing K for goal of 4  Mg is at goal of 2

## 2020-11-08 NOTE — H&P
[facility]  History & Physical    Patient Name: Nabil Urias  MRN: [id number]  Admission Date: 11/7/2020  Attending Physician: Peggy Mcbride MD  Primary Care Provider: Flash Weiner MD         Patient information was obtained from patient and ER records.     Subjective:     Principal Problem:Atrial fibrillation with RVR    Chief Complaint:   Chief Complaint   Patient presents with    Chest Pain     since 3pm    Shortness of Breath    Palpitations     hx of afib        HPI: 35 year old male with PMHx parox afib on Cardizem po and Eliquis presents with palpitations that started at 3pm today.  He noticed mild symptoms in his chest and abdomen initially and thought perhaps it was gas.  Symptoms were intermittent.  Later, while in bed, he noted more severe and persistent palpitations.  When he got up to let the dogs out, he really felt poorly and decided to come to the ED.  He sees Dr. Ramirez, Cardiology, in office.  Reports mostly compliant with medication and has not recently missed his medication. Denies lightheadedness, SOB.     Past Medical History:   Diagnosis Date    Atrial fibrillation     Hyperlipidemia        Past Surgical History:   Procedure Laterality Date    TONSILLECTOMY  1990       Review of patient's allergies indicates:  No Known Allergies    No current facility-administered medications on file prior to encounter.      Current Outpatient Medications on File Prior to Encounter   Medication Sig    apixaban (ELIQUIS ORAL) Take 5 mg by mouth 2 (two) times a day.    cyclobenzaprine (FLEXERIL) 5 MG tablet Take 5 mg by mouth 3 (three) times daily as needed for Muscle spasms.    diltiaZEM (CARDIZEM LA) 120 mg 24 hr tablet Take 120 mg by mouth once daily.    [DISCONTINUED] simvastatin (ZOCOR) 10 MG tablet Take 10 mg by mouth every evening.     Family History     Problem Relation (Age of Onset)    Heart attack Maternal Grandfather        Tobacco Use    Smoking  status: Never Smoker    Smokeless tobacco: Current User     Types: Snuff   Substance and Sexual Activity    Alcohol use: Yes     Alcohol/week: 4.0 standard drinks     Types: 4 Cans of beer per week     Frequency: 2-4 times a month     Drinks per session: 3 or 4     Binge frequency: Less than monthly    Drug use: Yes     Frequency: 1.0 times per week     Types: Marijuana    Sexual activity: Yes     Partners: Female     Review of Systems   Constitutional: Negative.    HENT: Negative.    Eyes: Negative.    Respiratory: Negative.    Cardiovascular: Positive for palpitations.   Gastrointestinal: Negative.    Endocrine: Negative.    Genitourinary: Negative.    Musculoskeletal: Negative.    Skin: Negative.    Allergic/Immunologic: Negative.    Neurological: Negative.    Hematological: Negative.    Psychiatric/Behavioral: Negative.      Objective:     Vital Signs (Most Recent):  Temp: 98.2 °F (36.8 °C) (11/08/20 0030)  Pulse: 95 (11/08/20 0030)  Resp: 17 (11/08/20 0030)  BP: 119/79 (11/08/20 0030)  SpO2: 95 % (11/08/20 0030) Vital Signs (24h Range):  Temp:  [98.2 °F (36.8 °C)-98.3 °F (36.8 °C)] 98.2 °F (36.8 °C)  Pulse:  [] 95  Resp:  [10-28] 17  SpO2:  [92 %-100 %] 95 %  BP: (109-235)/() 119/79     Weight: 109.9 kg (242 lb 4.6 oz)  Body mass index is 32.86 kg/m².    Physical Exam  Vitals signs and nursing note reviewed.   Constitutional:       General: He is not in acute distress.     Appearance: He is well-developed.   HENT:      Head: Normocephalic and atraumatic.   Eyes:      Pupils: Pupils are equal, round, and reactive to light.   Neck:      Musculoskeletal: Normal range of motion.   Cardiovascular:      Heart sounds: No murmur.      Comments: IRR  Pulmonary:      Effort: Pulmonary effort is normal.      Breath sounds: Normal breath sounds. No wheezing.   Abdominal:      General: There is no distension.      Palpations: Abdomen is soft.      Tenderness: There is no abdominal tenderness. There is no  guarding or rebound.   Musculoskeletal: Normal range of motion.   Skin:     Findings: No rash.   Neurological:      Mental Status: He is alert and oriented to person, place, and time.      Cranial Nerves: No cranial nerve deficit.      Sensory: No sensory deficit.           CRANIAL NERVES     CN III, IV, VI   Pupils are equal, round, and reactive to light.       Significant Labs:   CBC:   Recent Labs   Lab 11/07/20 2057   WBC 14.41*   HGB 17.3   HCT 50.3        CMP:   Recent Labs   Lab 11/07/20 2057      K 3.8      CO2 30*      BUN 20   CREATININE 1.4   CALCIUM 9.9   PROT 7.7   ALBUMIN 4.9   BILITOT 1.2*   ALKPHOS 81   AST 25   ALT 28   ANIONGAP 11   EGFRNONAA >60.0       Significant Imaging: EKG: I have reviewed all pertinent results/findings within the past 24 hours and my personal findings are: Afib    Assessment/Plan:     * Atrial fibrillation with RVR  Patient being admitted inpatient for  on presentation that has not yet improved on cardizem gtt started in the ED.  Titrating cardizem gtt  Continuing his home po cardizem  Anticoagulation with Eliquis  Trending troponin for completeness sakes  Cardiology consulted  Tele monitoring  Replacing K for goal of 4  Mg is at goal of 2        Hyperlipidemia  Chronic medical condition.  Continuing home medication.  Monitoring.        Paroxysmal atrial fibrillation  Diagnosed 8/2020.  Required cardioversion at that time.        VTE Risk Mitigation (From admission, onward)         Ordered     apixaban tablet 5 mg  2 times daily      11/08/20 0026     IP VTE HIGH RISK PATIENT  Once      11/08/20 0026     Place sequential compression device  Until discontinued      11/08/20 0026                   Peggy Mcbride MD  Department of Hospital Medicine   [facility]

## 2020-11-08 NOTE — CONSULTS
[facility]  Department of Cardiology  Consult Note      PATIENT NAME: Nabil Urias  MRN: [id number]  TODAY'S DATE: 11/08/2020  ADMIT DATE: 11/7/2020                          CONSULT REQUESTED BY: Jori Dawkins MD    SUBJECTIVE     PRINCIPAL PROBLEM: Atrial fibrillation with RVR   35 year old male with PMHx parox afib on Cardizem po and Eliquis presents with palpitations that started at 3pm today.  He noticed mild symptoms in his chest and abdomen initially and thought perhaps it was gas.  Symptoms were intermittent.  Later, while in bed, he noted more severe and persistent palpitations.  When he got up to let the dogs out, he really felt poorly and decided to come to the ED.  He sees Dr. Ramirez, Cardiology, in office.  Reports mostly compliant with medication and has not recently missed his medication. Denies lightheadedness, SOB.     REASON FOR CONSULT:  Palpitations    HPI:  Patient is a 35-year-old male with past medical history of paroxysmal atrial fibrillation and few months ago patient was here at that time was found to be in atrial fibrillation.  And patient did not convert spontaneously to sinus rhythm patient was cardioverted and was placed on Eliquis p.o. b.i.d..  Patient noticed that he was having palpitations at 3:00 p.m. today and on exertion patient would feel palpitations in his neck.  At which time he decided come to the emergency room.  At the present time patient denies any chest pain tightness heaviness denies any shortness of breath or difficulty in breathing denies any dizziness or lightheadedness or nausea vomiting or diaphoresis.        Review of patient's allergies indicates:  No Known Allergies    Past Medical History:   Diagnosis Date    Atrial fibrillation     Hyperlipidemia      Past Surgical History:   Procedure Laterality Date    TONSILLECTOMY  1990     Social History     Tobacco Use    Smoking status: Never Smoker    Smokeless tobacco: Current User     Types:  Snuff   Substance Use Topics    Alcohol use: Yes     Alcohol/week: 4.0 standard drinks     Types: 4 Cans of beer per week     Frequency: 2-4 times a month     Drinks per session: 3 or 4     Binge frequency: Less than monthly    Drug use: Yes     Frequency: 1.0 times per week     Types: Marijuana      Family History      Problem Relation (Age of Onset)     Heart attack Maternal Grandfather             REVIEW OF SYSTEMS  CONSTITUTIONAL: Negative for chills, fatigue and fever.   EYES: No double vision, No blurred vision  NEURO: No headaches, No dizziness  RESPIRATORY: Negative for cough, shortness of breath and wheezing.    CARDIOVASCULAR: Negative for chest pain. Negative for leg swelling.  Recurrent palpitations.GI: Negative for abdominal pain, No melena, diarrhea, nausea and vomiting.   : Negative for dysuria and frequency, Negative for hematuria  SKIN: Negative for bruising, Negative for edema or discoloration noted.   ENDOCRINE: Negative for polyphagia, Negative for heat intolerance, Negative for cold intolerance  PSYCHIATRIC: Negative for depression, Negative for anxiety, Negative for memory loss  MUSCULOSKELETAL: Negative for neck pain, Negative for muscle weakness, Negative for back pain     OBJECTIVE     VITAL SIGNS (Most Recent)  Temp: 98.3 °F (36.8 °C) (11/08/20 1140)  Pulse: 93 (11/08/20 1140)  Resp: 20 (11/08/20 1140)  BP: 130/89 (11/08/20 1140)  SpO2: 97 % (11/08/20 1140)    VENTILATION STATUS  Resp: 20 (11/08/20 1140)  SpO2: 97 % (11/08/20 1140)       I & O (Last 24H):    Intake/Output Summary (Last 24 hours) at 11/8/2020 1723  Last data filed at 11/8/2020 0800  Gross per 24 hour   Intake 980 ml   Output 1650 ml   Net -670 ml       WEIGHTS  Wt Readings from Last 1 Encounters:   11/08/20 0030 109.9 kg (242 lb 4.6 oz)   11/07/20 2053 108.9 kg (240 lb)       PHYSICAL EXAM  GENERAL: well built, well nourished, well-developed in no apparent distress alert and oriented.  HEENT: Normocephalic. Pupils normal  and conjunctivae normal.  NECK: No JVD. No bruit..   THYROID: Thyroid not enlarged. No nodules present..   CARDIAC: Irregular rate and rhythm. S1 is normal.S2 i audible.    CHEST ANATOMY: normal.   LUNGS: Clear to auscultation. No wheezing or rhonchi..   ABDOMEN: Soft Normal bowel sounds  URINARY: No ashley catheter   EXTREMITIES: No cyanosis, clubbing or edema noted at this time., no calf tenderness bilaterally.   PERIPHERAL VASCULAR SYSTEM: Good palpable distal pulses.   CENTRAL NERVOUS SYSTEM: No focal motor or sensory deficits noted.   SKIN: Skin without lesions, moist, well perfused.   MUSCLE STRENGTH & TONE: No noteable weakness, atrophy or abnormal movement.     HOME MEDICATIONS:  No current facility-administered medications on file prior to encounter.      Current Outpatient Medications on File Prior to Encounter   Medication Sig Dispense Refill    apixaban (ELIQUIS ORAL) Take 5 mg by mouth 2 (two) times a day.      cyclobenzaprine (FLEXERIL) 5 MG tablet Take 5 mg by mouth 3 (three) times daily as needed for Muscle spasms.      diltiaZEM (CARDIZEM LA) 120 mg 24 hr tablet Take 120 mg by mouth once daily.      [DISCONTINUED] simvastatin (ZOCOR) 10 MG tablet Take 10 mg by mouth every evening.         SCHEDULED MEDS:   apixaban  5 mg Oral BID    diltiaZEM  120 mg Oral Daily       CONTINUOUS INFUSIONS:   dilTIAZem 10 mg/hr (11/08/20 0507)       PRN MEDS:acetaminophen, calcium chloride IVPB, calcium chloride IVPB, calcium chloride IVPB, dextrose 50%, dextrose 50%, glucagon (human recombinant), glucose, glucose, magnesium oxide, magnesium sulfate IVPB, magnesium sulfate IVPB, magnesium sulfate IVPB, magnesium sulfate IVPB, melatonin, ondansetron, polyethylene glycol, potassium chloride in water, potassium chloride in water, potassium chloride in water, potassium chloride in water, potassium chloride, potassium chloride, potassium chloride, potassium chloride, sodium chloride 0.9%, sodium phosphate IVPB,  sodium phosphate IVPB, sodium phosphate IVPB, sodium phosphate IVPB, sodium phosphate IVPB    LABS AND DIAGNOSTICS     CBC LAST 3 DAYS  Recent Labs   Lab 11/07/20 2057   WBC 14.41*   RBC 5.47   HGB 17.3   HCT 50.3   MCV 92   MCH 31.6*   MCHC 34.4   RDW 12.2      MPV 10.8   GRAN 46.8  6.7   LYMPH 37.3  5.4*   MONO 8.8  1.3*   BASO 0.13   NRBC 0       COAGULATION LAST 3 DAYS  Recent Labs   Lab 11/07/20 2057   LABPT 12.6   INR 1.0       CHEMISTRY LAST 3 DAYS  Recent Labs   Lab 11/07/20 2057 11/08/20 0123    138   K 3.8 3.7    100   CO2 30* 25   ANIONGAP 11 13   BUN 20 20   CREATININE 1.4 1.1    118*   CALCIUM 9.9 9.1   MG 2.1 1.9   ALBUMIN 4.9  --    PROT 7.7  --    ALKPHOS 81  --    ALT 28  --    AST 25  --    BILITOT 1.2*  --        CARDIAC PROFILE LAST 3 DAYS  Recent Labs   Lab 11/07/20 2057 11/08/20 0123 11/08/20  0704   BNP 20  20  --   --    TROPONINI <0.030 <0.030 <0.030       ENDOCRINE LAST 3 DAYS  Recent Labs   Lab 11/07/20 2057   TSH 1.790       LAST ARTERIAL BLOOD GAS  ABG  No results for input(s): PH, PO2, PCO2, HCO3, BE in the last 168 hours.    LAST 7 DAYS MICROBIOLOGY   Microbiology Results (last 7 days)     ** No results found for the last 168 hours. **          MOST RECENT IMAGING  US Lower Extremity Veins Bilateral  VENOUS DOPPLER ULTRASOUND BILATERAL LOWER EXTREMITIES    CLINICAL DATA: Leg pain.    FINDINGS: Color and duplex Doppler sonographic assessment with  spectral analysis of the bilateral lower extremities demonstrates no  evidence of deep vein thrombosis. Normal color Doppler flow,  augmentation, and compressibility are demonstrated at all levels.    IMPRESSION:  1. No evidence of DVT in the bilateral lower extremities.    Electronically Signed by Jairo Ely M.D. on 11/8/2020 1:01 PM  X-Ray Chest AP Portable  HISTORY: Chest pain and dyspnea.    FINDINGS: Portable chest radiograph at 2054 hours with no prior  studies for comparison shows the  cardiomediastinal silhouette and  pulmonary vasculature are within normal limits.    The lungs are normally expanded, with no consolidation, large pleural  effusion, or evidence of pulmonary edema. No confluent infiltrates or  pneumothorax. There are no significant osseous abnormalities.    IMPRESSION: No evidence of active cardiopulmonary disease.    Electronically Signed by Raymond OLMOS on 11/8/2020 6:33 AM      ECHOCARDIOGRAM RESULTS (last 5)  No results found for this or any previous visit.    CURRENT/PREVIOUS VISIT EKG  Results for orders placed or performed during the hospital encounter of 11/07/20   EKG 12-lead    Collection Time: 11/07/20  8:53 PM    Narrative    Test Reason : R07.9,    Vent. Rate : 183 BPM     Atrial Rate : 197 BPM     P-R Int : 000 ms          QRS Dur : 072 ms      QT Int : 214 ms       P-R-T Axes : 000 065 -29 degrees     QTc Int : 373 ms    Atrial fibrillation with rapid ventricular response  Abnormal QRS-T angle, consider primary T wave abnormality  Abnormal ECG  No previous ECGs available    Referred By: [email or web address]   SELF           Confirmed By:            ASSESSMENT/PLAN:     Active Hospital Problems    Diagnosis    *Atrial fibrillation with RVR    Paroxysmal atrial fibrillation    Hyperlipidemia       ASSESSMENT & PLAN:   1.  Paroxysmal atrial fibrillation with rapid ventricular rate  2.  Mixed hyperlipidemia    RECOMMENDATIONS:  1.  Patient is currently on Eliquis 5 mg p.o. b.i.d. continue the same.  2.  Will replace potassium and magnesium and give additional dose of magnesium.  Hopefully should spontaneously convert.  3.  If patient does not convert to sinus rhythm consideration for possible GABRIELA and cardioversion.  4.  Consideration of changing medications from Cardizem to sotalol.  5.  Dr. Ramirez is going to assume patient care as of tomorrow morning.  6.  Thank you for the consultation.        Pramod Bedolla MD  [facility]  Department of  Cardiology  Date of Service: 11/08/2020  5:23 PM

## 2020-11-08 NOTE — ED PROVIDER NOTES
Encounter Date: 11/7/2020       History     Chief Complaint   Patient presents with    Chest Pain     since 3pm    Shortness of Breath    Palpitations     hx of afib     35-year-old male presented emergency department with chest tightness and palpitations and rapid heartbeat and felt short of breath with exertion.  Patient had similar presentations in the past and has history of atrial fibrillation.  Patient started having these symptoms since 3:00 p.m. and has persistent came here.  Patient on blood thinners for treatment of atrial fibrillation.  Denies any dysuria or hematuria.  Denies weakness or numbness.  Patient describes this chest pain as pressure diffusely in the chest and tightness.  Patient's pain does not radiate.  Rates it 3/10.        Review of patient's allergies indicates:  No Known Allergies  Past Medical History:   Diagnosis Date    Atrial fibrillation     Hyperlipidemia      Past Surgical History:   Procedure Laterality Date    TONSILLECTOMY  1990     Family History   Problem Relation Age of Onset    Heart attack Maternal Grandfather      Social History     Tobacco Use    Smoking status: Never Smoker    Smokeless tobacco: Current User     Types: Snuff   Substance Use Topics    Alcohol use: Yes     Alcohol/week: 4.0 standard drinks     Types: 4 Cans of beer per week     Frequency: 2-4 times a month     Drinks per session: 3 or 4     Binge frequency: Less than monthly    Drug use: Yes     Frequency: 1.0 times per week     Types: Marijuana     Review of Systems   Constitutional: Negative.    HENT: Negative.    Eyes: Negative.    Respiratory: Positive for shortness of breath.    Cardiovascular: Positive for chest pain and palpitations.   Gastrointestinal: Negative.    Endocrine: Negative.    Genitourinary: Negative.    Musculoskeletal: Negative.    Skin: Negative.    Allergic/Immunologic: Negative.    Neurological: Negative.    Hematological: Negative.    Psychiatric/Behavioral: Negative.     All other systems reviewed and are negative.      Physical Exam     Initial Vitals [11/07/20 2053]   BP Pulse Resp Temp SpO2   (!) 153/93 (!) 180 (!) 22 98.3 °F (36.8 °C) 100 %      MAP       --         Physical Exam    Nursing note and vitals reviewed.  Constitutional: He appears well-developed and well-nourished.   HENT:   Head: Normocephalic and atraumatic.   Nose: Nose normal.   Eyes: Conjunctivae and EOM are normal.   Neck: Normal range of motion. No tracheal deviation present.   Cardiovascular: Normal heart sounds and intact distal pulses. Exam reveals no friction rub.    No murmur heard.  Irregularly irregular rhythm with tachycardia   Pulmonary/Chest: Breath sounds normal. No respiratory distress. He has no wheezes. He has no rales.   Abdominal: Soft. He exhibits no distension. There is no abdominal tenderness.   Musculoskeletal: Normal range of motion.   Neurological: He is alert and oriented to person, place, and time. He has normal strength. No sensory deficit. GCS score is 15. GCS eye subscore is 4. GCS verbal subscore is 5. GCS motor subscore is 6.   Skin: Skin is warm and dry. Capillary refill takes less than 2 seconds.   Psychiatric: He has a normal mood and affect. Thought content normal.         ED Course   Critical Care    Date/Time: 11/7/2020 10:31 PM  Performed by: Shital Moise MD  Authorized by: Shital Moise MD   Direct patient critical care time: 20 minutes  Ordering / reviewing critical care time: 5 minutes  Documentation critical care time: 5 minutes  Consulting other physicians critical care time: 5 minutes  Total critical care time (exclusive of procedural time) : 35 minutes        Labs Reviewed   CBC W/ AUTO DIFFERENTIAL - Abnormal; Notable for the following components:       Result Value    WBC 14.41 (*)     MCH 31.6 (*)     Immature Granulocytes 0.6 (*)     Immature Grans (Abs) 0.08 (*)     Lymph # 5.4 (*)     Mono # 1.3 (*)     Eos # 0.8 (*)     All other components within normal limits    COMPREHENSIVE METABOLIC PANEL - Abnormal; Notable for the following components:    CO2 30 (*)     Total Bilirubin 1.2 (*)     All other components within normal limits   TROPONIN I   B-TYPE NATRIURETIC PEPTIDE   PROTIME-INR   B-TYPE NATRIURETIC PEPTIDE   MAGNESIUM   TSH   SARS-COV-2 RNA AMPLIFICATION, QUAL     EKG Readings: (Independently Interpreted)   Initial Reading: No STEMI. Rhythm: Atrial Fibrillation. Ectopy: No Ectopy. Conduction: Normal.   Atrial fibrillation with rapid ventricular response       Imaging Results          X-Ray Chest AP Portable (In process)               X-Rays:   Independently Interpreted Readings:   Other Readings:  Chest x-ray unremarkable    Medical Decision Making:   Differential Diagnosis:   35-year-old male presented emergency department with palpitations and chest tightness and shortness of breath consistent with previous episode of atrial fibrillation.  Patient noted to be in atrial fibrillation with rapid ventricular response.  Heart rate improved with treatment with Cardizem.  IV fluids given.  Patient hemodynamically stable and has improved Hospital Medicine consulted for evaluation for admission and further management.  Patient already on blood thinners.  Screening labs and cardiac workup reviewed.  Chest pain and shortness of breath resolved with treatment of tachycardia  Clinical Tests:   Lab Tests: Reviewed  Radiological Study: Reviewed  Medical Tests: Reviewed                             Clinical Impression:     ICD-10-CM ICD-9-CM   1. Atrial fibrillation with rapid ventricular response  I48.91 427.31   2. Shortness of breath  R06.02 786.05   3. Chest pain  R07.9 786.50                          ED Disposition Condition    Admit                             Shital Moise MD  11/07/20 3122

## 2020-11-08 NOTE — PROGRESS NOTES
Patient is seen in medical by this morning.  Admitted for AFib with RVR.  Now on Cardizem with better control heart rate.  Cardiology to patient today.  Denies chest pain, shortness of breath.  Feels well otherwise.  Complains of bilateral calf pain.  Low suspicion of DVT with patient on eliquis.     General: Patient resting comfortably in no acute distress. Appears as stated age. Calm. Obese  Eyes: EOM intact. No conjunctivae injection. No scleral icterus.  ENT: Hearing grossly intact. No discharge from ears. No nasal discharge.   CVS: Tachycardic. No LE edema BL.  Lungs: CTA BL, no wheezing or crackles. Good breath sounds. No accessory muscle use. No acute respiratory distress  Neuro: AOx3. GCS 15. Cranial nerves grossly intact. Moves all extremities equally. Follows commands. Responds appropriately

## 2020-11-08 NOTE — SUBJECTIVE & OBJECTIVE
Past Medical History:   Diagnosis Date    Atrial fibrillation     Hyperlipidemia        Past Surgical History:   Procedure Laterality Date    TONSILLECTOMY  1990       Review of patient's allergies indicates:  No Known Allergies    No current facility-administered medications on file prior to encounter.      Current Outpatient Medications on File Prior to Encounter   Medication Sig    apixaban (ELIQUIS ORAL) Take 5 mg by mouth 2 (two) times a day.    cyclobenzaprine (FLEXERIL) 5 MG tablet Take 5 mg by mouth 3 (three) times daily as needed for Muscle spasms.    diltiaZEM (CARDIZEM LA) 120 mg 24 hr tablet Take 120 mg by mouth once daily.    [DISCONTINUED] simvastatin (ZOCOR) 10 MG tablet Take 10 mg by mouth every evening.     Family History     Problem Relation (Age of Onset)    Heart attack Maternal Grandfather        Tobacco Use    Smoking status: Never Smoker    Smokeless tobacco: Current User     Types: Snuff   Substance and Sexual Activity    Alcohol use: Yes     Alcohol/week: 4.0 standard drinks     Types: 4 Cans of beer per week     Frequency: 2-4 times a month     Drinks per session: 3 or 4     Binge frequency: Less than monthly    Drug use: Yes     Frequency: 1.0 times per week     Types: Marijuana    Sexual activity: Yes     Partners: Female     Review of Systems   Constitutional: Negative.    HENT: Negative.    Eyes: Negative.    Respiratory: Negative.    Cardiovascular: Positive for palpitations.   Gastrointestinal: Negative.    Endocrine: Negative.    Genitourinary: Negative.    Musculoskeletal: Negative.    Skin: Negative.    Allergic/Immunologic: Negative.    Neurological: Negative.    Hematological: Negative.    Psychiatric/Behavioral: Negative.      Objective:     Vital Signs (Most Recent):  Temp: 98.2 °F (36.8 °C) (11/08/20 0030)  Pulse: 95 (11/08/20 0030)  Resp: 17 (11/08/20 0030)  BP: 119/79 (11/08/20 0030)  SpO2: 95 % (11/08/20 0030) Vital Signs (24h Range):  Temp:  [98.2 °F (36.8  °C)-98.3 °F (36.8 °C)] 98.2 °F (36.8 °C)  Pulse:  [] 95  Resp:  [10-28] 17  SpO2:  [92 %-100 %] 95 %  BP: (109-235)/() 119/79     Weight: 109.9 kg (242 lb 4.6 oz)  Body mass index is 32.86 kg/m².    Physical Exam  Vitals signs and nursing note reviewed.   Constitutional:       General: He is not in acute distress.     Appearance: He is well-developed.   HENT:      Head: Normocephalic and atraumatic.   Eyes:      Pupils: Pupils are equal, round, and reactive to light.   Neck:      Musculoskeletal: Normal range of motion.   Cardiovascular:      Heart sounds: No murmur.      Comments: IRR  Pulmonary:      Effort: Pulmonary effort is normal.      Breath sounds: Normal breath sounds. No wheezing.   Abdominal:      General: There is no distension.      Palpations: Abdomen is soft.      Tenderness: There is no abdominal tenderness. There is no guarding or rebound.   Musculoskeletal: Normal range of motion.   Skin:     Findings: No rash.   Neurological:      Mental Status: He is alert and oriented to person, place, and time.      Cranial Nerves: No cranial nerve deficit.      Sensory: No sensory deficit.           CRANIAL NERVES     CN III, IV, VI   Pupils are equal, round, and reactive to light.       Significant Labs:   CBC:   Recent Labs   Lab 11/07/20 2057   WBC 14.41*   HGB 17.3   HCT 50.3        CMP:   Recent Labs   Lab 11/07/20 2057      K 3.8      CO2 30*      BUN 20   CREATININE 1.4   CALCIUM 9.9   PROT 7.7   ALBUMIN 4.9   BILITOT 1.2*   ALKPHOS 81   AST 25   ALT 28   ANIONGAP 11   EGFRNONAA >60.0       Significant Imaging: EKG: I have reviewed all pertinent results/findings within the past 24 hours and my personal findings are: Afib

## 2020-11-08 NOTE — PLAN OF CARE
11/08/20 1204   Discharge Assessment   Assessment Type Discharge Planning Assessment   Confirmed/corrected address and phone number on facesheet? Yes   Assessment information obtained from? Patient;Medical Record   Communicated expected length of stay with patient/caregiver yes   Prior to hospitilization cognitive status: Alert/Oriented   Prior to hospitalization functional status: Independent   Current cognitive status: Alert/Oriented   Current Functional Status: Independent   Facility Arrived From: home   Lives With spouse;child(juli), dependent   Able to Return to Prior Arrangements yes   Is patient able to care for self after discharge? Yes   Who are your caregiver(s) and their phone number(s)? Spouse Kendall Urias [phone]   Readmission Within the Last 30 Days no previous admission in last 30 days   Patient currently being followed by outpatient case management? No   Patient currently receives any other outside agency services? No   Equipment Currently Used at Home none   Part D Coverage none   Do you have any problems affording any of your prescribed medications? No   Is the patient taking medications as prescribed? yes   Does the patient have transportation home? Yes   Transportation Anticipated family or friend will provide   Dialysis Name and Scheduled days none   Does the patient receive services at the [facility]madin Clinic? No   Discharge Plan A Home with family   Discharge Plan B Home with family   DME Needed Upon Discharge  none   Patient/Family in Agreement with Plan yes   DPS Date:  TBD  PCP:  Dr. Flash Weiner   Discharge Plan: Home or Self Care  Pharmacy: [facility]  Cm will continue to follow for further discharge planning needs.

## 2020-11-09 PROCEDURE — 63600175 PHARM REV CODE 636 W HCPCS: Performed by: INTERNAL MEDICINE

## 2020-11-09 PROCEDURE — 94761 N-INVAS EAR/PLS OXIMETRY MLT: CPT

## 2020-11-09 PROCEDURE — 25000003 PHARM REV CODE 250: Performed by: INTERNAL MEDICINE

## 2020-11-09 PROCEDURE — 25000003 PHARM REV CODE 250: Performed by: FAMILY MEDICINE

## 2020-11-09 PROCEDURE — 21000000 HC CCU ICU ROOM CHARGE

## 2020-11-09 RX ORDER — CYCLOBENZAPRINE HCL 5 MG
5 TABLET ORAL 3 TIMES DAILY PRN
Status: DISCONTINUED | OUTPATIENT
Start: 2020-11-09 | End: 2020-11-10 | Stop reason: HOSPADM

## 2020-11-09 RX ORDER — DIGOXIN 0.25 MG/ML
250 INJECTION INTRAMUSCULAR; INTRAVENOUS ONCE
Status: COMPLETED | OUTPATIENT
Start: 2020-11-09 | End: 2020-11-09

## 2020-11-09 RX ORDER — SOTALOL HYDROCHLORIDE 80 MG/1
80 TABLET ORAL 2 TIMES DAILY
Status: DISCONTINUED | OUTPATIENT
Start: 2020-11-09 | End: 2020-11-10 | Stop reason: HOSPADM

## 2020-11-09 RX ADMIN — DIGOXIN 250 MCG: 0.25 INJECTION INTRAMUSCULAR; INTRAVENOUS at 04:11

## 2020-11-09 RX ADMIN — DILTIAZEM HYDROCHLORIDE 12.5 MG/HR: 5 INJECTION INTRAVENOUS at 05:11

## 2020-11-09 RX ADMIN — SOTALOL HYDROCHLORIDE 80 MG: 80 TABLET ORAL at 09:11

## 2020-11-09 RX ADMIN — DILTIAZEM HYDROCHLORIDE 5 MG/HR: 5 INJECTION INTRAVENOUS at 02:11

## 2020-11-09 RX ADMIN — DILTIAZEM HYDROCHLORIDE 120 MG: 120 CAPSULE, COATED, EXTENDED RELEASE ORAL at 09:11

## 2020-11-09 RX ADMIN — ACETAMINOPHEN 650 MG: 325 TABLET, FILM COATED ORAL at 09:11

## 2020-11-09 RX ADMIN — APIXABAN 5 MG: 5 TABLET, FILM COATED ORAL at 09:11

## 2020-11-09 RX ADMIN — CYCLOBENZAPRINE HYDROCHLORIDE 5 MG: 5 TABLET, FILM COATED ORAL at 03:11

## 2020-11-09 NOTE — RESPIRATORY THERAPY
This note also relates to the following rows which could not be included:  SpO2 - Cannot attach notes to unvalidated device data  Pulse - Cannot attach notes to unvalidated device data  Resp - Cannot attach notes to unvalidated device data       11/08/20 2202   Patient Assessment/Suction   Level of Consciousness (AVPU)   (sleeping)   Respiratory Effort Unlabored   Expansion/Accessory Muscles/Retractions expansion symmetric;no retractions;no use of accessory muscles   PRE-TX-O2   O2 Device (Oxygen Therapy) room air   Pulse Oximetry Type Continuous   $ Pulse Oximetry - Multiple Charge Pulse Oximetry - Multiple   Aerosol Therapy   $ Aerosol Therapy Charges PRN treatment not required   Respiratory Evaluation   $ Care Plan Tech Time 15 min   Evaluation For   (care plan)        11/08/20 2202   Patient Assessment/Suction   Level of Consciousness (AVPU)   (sleeping)   Respiratory Effort Unlabored   Expansion/Accessory Muscles/Retractions expansion symmetric;no retractions;no use of accessory muscles   PRE-TX-O2   O2 Device (Oxygen Therapy) room air   Pulse Oximetry Type Continuous   $ Pulse Oximetry - Multiple Charge Pulse Oximetry - Multiple   Aerosol Therapy   $ Aerosol Therapy Charges PRN treatment not required   Respiratory Evaluation   $ Care Plan Tech Time 15 min   Evaluation For   (care plan)

## 2020-11-09 NOTE — PROGRESS NOTES
[facility] Medicine  Progress Note    Patient name: Nabil Urias  MRN: [id number]  Admit Date: 11/7/2020   LOS: 2 days     SUBJECTIVE:     Principal problem: Atrial fibrillation with RVR    Interval History:    Feels well. Frustrated and wants to go home. Currently on cardizem gtt with -115. Cardio possible GABRIELA cardioversion.    Scheduled Meds:   apixaban  5 mg Oral BID    diltiaZEM  120 mg Oral Daily     Continuous Infusions:   dilTIAZem 5 mg/hr (11/09/20 1420)     PRN Meds:acetaminophen, calcium chloride IVPB, calcium chloride IVPB, calcium chloride IVPB, cyclobenzaprine, dextrose 50%, dextrose 50%, glucagon (human recombinant), glucose, glucose, magnesium oxide, magnesium sulfate IVPB, magnesium sulfate IVPB, magnesium sulfate IVPB, magnesium sulfate IVPB, melatonin, ondansetron, polyethylene glycol, potassium chloride in water, potassium chloride in water, potassium chloride in water, potassium chloride in water, potassium chloride, potassium chloride, potassium chloride, potassium chloride, sodium chloride 0.9%, sodium phosphate IVPB, sodium phosphate IVPB, sodium phosphate IVPB, sodium phosphate IVPB, sodium phosphate IVPB    Review of patient's allergies indicates:  No Known Allergies    Review of Systems  As per subjective    OBJECTIVE:     Vital Signs (Most Recent)  Temp: 97.9 °F (36.6 °C) (11/09/20 1110)  Pulse: 96 (11/09/20 1110)  Resp: 19 (11/09/20 1110)  BP: (!) 151/98 (11/09/20 1110)  SpO2: 96 % (11/09/20 1110)    Vital Signs Range (Last 24H):  Temp:  [97.3 °F (36.3 °C)-98 °F (36.7 °C)]   Pulse:  []   Resp:  [18-34]   BP: (130-151)/(60-98)   SpO2:  [95 %-98 %]     I & O (Last 24H):    Intake/Output Summary (Last 24 hours) at 11/9/2020 1526  Last data filed at 11/9/2020 1100  Gross per 24 hour   Intake 329.17 ml   Output 850 ml   Net -520.83 ml       Physical Exam:  General: Patient resting comfortably in no acute distress. Appears as stated age. Calm  Eyes: EOM  intact. No conjunctivae injection. No scleral icterus.  ENT: Hearing grossly intact. No discharge from ears. No nasal discharge.   CVS: RRR. No LE edema BL.  Lungs: CTA BL, no wheezing or crackles. Good breath sounds. No accessory muscle use. No acute respiratory distress  Neuro: AOx3. GCS 15. Cranial nerves grossly intact. Moves all extremities equally. Follows commands. Responds appropriately     Laboratory:  CBC:   Recent Labs   Lab 11/07/20 2057   WBC 14.41*   HGB 17.3   HCT 50.3        CMP:   Recent Labs   Lab 11/07/20 2057 11/08/20  0123    138   K 3.8 3.7    100   CO2 30* 25    118*   BUN 20 20   CREATININE 1.4 1.1   CALCIUM 9.9 9.1   PROT 7.7  --    ALBUMIN 4.9  --    BILITOT 1.2*  --    ALKPHOS 81  --    AST 25  --    ALT 28  --    ANIONGAP 11 13   EGFRNONAA >60.0 >60.0       Microbiology Results (last 7 days)     ** No results found for the last 168 hours. **           Diagnostic Results:  US Lower Extremity Veins Bilateral [[id number]] Collected: 11/08/20 1222   Order Status: Completed Updated: 11/08/20 1304   Narrative:     VENOUS DOPPLER ULTRASOUND BILATERAL LOWER EXTREMITIES     CLINICAL DATA: Leg pain.     FINDINGS: Color and duplex Doppler sonographic assessment with   spectral analysis of the bilateral lower extremities demonstrates no   evidence of deep vein thrombosis. Normal color Doppler flow,   augmentation, and compressibility are demonstrated at all levels.     IMPRESSION:   1. No evidence of DVT in the bilateral lower extremities.          ASSESSMENT/PLAN:     Active Hospital Problems    Diagnosis  POA    *Atrial fibrillation with RVR [I48.91]  Yes    Paroxysmal atrial fibrillation [I48.0]  Yes    Hyperlipidemia [E78.5]  Yes      Resolved Hospital Problems   No resolved problems to display.           Plan:   Cardizem gtt  Possible GABRIELA cardioversion  Continue home medications  US LE neg DVT    Cardio, Dr. Ramirez  Appreciate consult      VTE Risk Mitigation  (From admission, onward)         Ordered     apixaban tablet 5 mg  2 times daily      11/08/20 0026     IP VTE HIGH RISK PATIENT  Once      11/08/20 0026     Place sequential compression device  Until discontinued      11/08/20 0026                    Medical Decision Making during this encounter was  [] Low Complexity  [x] Moderate Complexity  [] High Complexity        Department Hospital Medicine  [facility]  Jori Dawkins MD  Date of service: 11/09/2020

## 2020-11-09 NOTE — PROGRESS NOTES
[facility]  Cardiology  Progress Note    Patient Name: Nabil Urias  MRN: [id number]  Admission Date: 11/7/2020  Hospital Length of Stay: 2 days  Code Status: Full Code   Attending Physician: Jori Dawkins MD   Primary Care Physician: Flash Weiner MD  Expected Discharge Date:   Principal Problem:Atrial fibrillation with RVR    Subjective:       Interval History:  Patient reports palpitations.  He denies any chest pain.  He denies lightheadedness, dizziness or syncopal episodes.  Telemetry shows atrial fibrillation with rapid ventricular response.  Patient states he has not missed his Eliquis.    ROS   Denies any bleeding issues.  Denies any focal weakness or numbness or paresthesias.  Objective:     Vital Signs (Most Recent):  Temp: 97.9 °F (36.6 °C) (11/09/20 1110)  Pulse: 96 (11/09/20 1110)  Resp: 19 (11/09/20 1110)  BP: (!) 151/98 (11/09/20 1110)  SpO2: 96 % (11/09/20 1110) Vital Signs (24h Range):  Temp:  [97.3 °F (36.3 °C)-98 °F (36.7 °C)] 97.9 °F (36.6 °C)  Pulse:  [] 96  Resp:  [18-34] 19  SpO2:  [95 %-98 %] 96 %  BP: (130-151)/(60-98) 151/98     Weight: 105.7 kg (233 lb 0.4 oz)  Body mass index is 31.6 kg/m².    SpO2: 96 %  O2 Device (Oxygen Therapy): room air      Intake/Output Summary (Last 24 hours) at 11/9/2020 1543  Last data filed at 11/9/2020 1100  Gross per 24 hour   Intake 329.17 ml   Output 850 ml   Net -520.83 ml       Lines/Drains/Airways     Peripheral Intravenous Line                 Peripheral IV - Single Lumen 11/08/20 1700 20 G Left;Posterior Hand less than 1 day                Scheduled Meds:   apixaban  5 mg Oral BID    diltiaZEM  120 mg Oral Daily     Continuous Infusions:   dilTIAZem 5 mg/hr (11/09/20 1420)     PRN Meds:.acetaminophen, calcium chloride IVPB, calcium chloride IVPB, calcium chloride IVPB, cyclobenzaprine, dextrose 50%, dextrose 50%, glucagon (human recombinant), glucose, glucose, magnesium oxide, magnesium sulfate IVPB, magnesium sulfate  IVPB, magnesium sulfate IVPB, magnesium sulfate IVPB, melatonin, ondansetron, polyethylene glycol, potassium chloride in water, potassium chloride in water, potassium chloride in water, potassium chloride in water, potassium chloride, potassium chloride, potassium chloride, potassium chloride, sodium chloride 0.9%, sodium phosphate IVPB, sodium phosphate IVPB, sodium phosphate IVPB, sodium phosphate IVPB, sodium phosphate IVPB     Physical Exam  HEENT: Normocephalic, atraumatic, PERRL, Conjunctiva pink, no scleral icterus.   CVS: S1S2+, iRRR, no murmurs, rubs or gallops, JVP: Normal.  LUNGS: Clear  ABDOMEN: Soft, NT, BS+  EXTREMITIES: No cyanosis, clubbing or edema  NEURO: AAO X 3.       Significant Labs:   BMP:   Recent Labs   Lab 11/07/20 2057 11/08/20 0123    118*    138   K 3.8 3.7    100   CO2 30* 25   BUN 20 20   CREATININE 1.4 1.1   CALCIUM 9.9 9.1   MG 2.1 1.9   , CMP   Recent Labs   Lab 11/07/20 2057 11/08/20 0123    138   K 3.8 3.7    100   CO2 30* 25    118*   BUN 20 20   CREATININE 1.4 1.1   CALCIUM 9.9 9.1   PROT 7.7  --    ALBUMIN 4.9  --    BILITOT 1.2*  --    ALKPHOS 81  --    AST 25  --    ALT 28  --    ANIONGAP 11 13   ESTGFRAFRICA >60.0 >60.0   EGFRNONAA >60.0 >60.0   , CBC   Recent Labs   Lab 11/07/20 2057   WBC 14.41*   HGB 17.3   HCT 50.3      , INR   Recent Labs   Lab 11/07/20 2057   INR 1.0   , Lipid Panel No results for input(s): CHOL, HDL, LDLCALC, TRIG, CHOLHDL in the last 48 hours. and Troponin   Recent Labs   Lab 11/07/20 2057 11/08/20 0123 11/08/20 0704   TROPONINI <0.030 <0.030 <0.030       Significant Imaging: Reviewed  Assessment and Plan:     IMPRESSION:  Atrial fibrillation with rapid ventricular response.  Paroxysmal. Currently on Cardizem GTT.  Possible obstructive sleep apnea.  Patient reports snoring, daytime fatigue and his pulse her symptom he stops breathing in the middle of the night at times. Has not had the sleep  study.   Hypertension.  H/o Marijuana use    PLAN:  1.  Give digoxin 250 mcg IV x1 now.  2.  Start sotalol 80 mg p.o. b.i.d..  3.  Continue current medical regimen otherwise.  4.  If the patient remains in atrial fibrillation with rapid ventricular response will consider cardioversion in a.m..  Patient would not need a GABRIELA since he states that he has not missed any Eliquis doses.  5.  Obtain ECG in the morning.        Danielle Ramirez MD  Cardiology  [facility]

## 2020-11-09 NOTE — HOSPITAL COURSE
Patient was admitted for AFib with RVR drip with improved heart rate control.  Patient had a cardioversion on 11/09/2020.  Patient tolerated procedure well.  Patient was stable discharge to home instructed to follow up with Cardiology, PCP.    General: Patient resting comfortably in no acute distress. Appears as stated age. Calm. Obese  Eyes: EOM intact. No conjunctivae injection. No scleral icterus.  ENT: Hearing grossly intact. No discharge from ears. No nasal discharge.   CVS: IRR. No LE edema BL.  Lungs: CTA BL, no wheezing or crackles. Good breath sounds. No accessory muscle use. No acute respiratory distress  Neuro: AOx3. GCS 15. Cranial nerves grossly intact. Moves all extremities equally. Follows commands. Responds appropriately

## 2020-11-10 VITALS
RESPIRATION RATE: 17 BRPM | TEMPERATURE: 98 F | OXYGEN SATURATION: 98 % | DIASTOLIC BLOOD PRESSURE: 72 MMHG | HEART RATE: 88 BPM | WEIGHT: 233 LBS | HEIGHT: 72 IN | SYSTOLIC BLOOD PRESSURE: 124 MMHG | BODY MASS INDEX: 31.56 KG/M2

## 2020-11-10 PROCEDURE — 25000003 PHARM REV CODE 250: Performed by: INTERNAL MEDICINE

## 2020-11-10 PROCEDURE — 93010 ELECTROCARDIOGRAM REPORT: CPT | Mod: ,,, | Performed by: INTERNAL MEDICINE

## 2020-11-10 PROCEDURE — 93010 EKG 12-LEAD: ICD-10-PCS | Mod: ,,, | Performed by: INTERNAL MEDICINE

## 2020-11-10 PROCEDURE — 93005 ELECTROCARDIOGRAM TRACING: CPT | Performed by: INTERNAL MEDICINE

## 2020-11-10 RX ORDER — SOTALOL HYDROCHLORIDE 80 MG/1
80 TABLET ORAL 2 TIMES DAILY
Qty: 60 TABLET | Refills: 2 | Status: SHIPPED | OUTPATIENT
Start: 2020-11-10 | End: 2021-02-10 | Stop reason: SDUPTHER

## 2020-11-10 RX ADMIN — DILTIAZEM HYDROCHLORIDE 120 MG: 120 CAPSULE, COATED, EXTENDED RELEASE ORAL at 08:11

## 2020-11-10 RX ADMIN — APIXABAN 5 MG: 5 TABLET, FILM COATED ORAL at 08:11

## 2020-11-10 RX ADMIN — SOTALOL HYDROCHLORIDE 80 MG: 80 TABLET ORAL at 08:11

## 2020-11-10 RX ADMIN — POTASSIUM CHLORIDE 20 MEQ: 20 TABLET, EXTENDED RELEASE ORAL at 08:11

## 2020-11-10 NOTE — PROGRESS NOTES
[facility]  Cardiology  Progress Note    Patient Name: Nabil Urias  MRN: [id number]  Admission Date: 11/7/2020  Hospital Length of Stay: 3 days  Code Status: Full Code   Attending Physician: Jori Dawkins MD   Primary Care Physician: Flash Weiner MD  Expected Discharge Date:   Principal Problem:Atrial fibrillation with RVR    Subjective:       Interval History:  Patient converted to normal sinus rhythm last night and he remained in sinus rhythm.  This morning he is symptomatic.  QTC interval is normal.    ROS   Denies any bleeding issues.  Denies any focal weakness or numbness or paresthesias.  Objective:     Vital Signs (Most Recent):  Temp: 97.6 °F (36.4 °C) (11/10/20 0320)  Pulse: 77 (11/10/20 0320)  Resp: 18 (11/10/20 0320)  BP: (!) 131/91 (11/10/20 0320)  SpO2: 98 % (11/09/20 2326) Vital Signs (24h Range):  Temp:  [97.4 °F (36.3 °C)-98.3 °F (36.8 °C)] 97.6 °F (36.4 °C)  Pulse:  [73-96] 77  Resp:  [18-28] 18  SpO2:  [93 %-98 %] 98 %  BP: (125-151)/(60-98) 131/91     Weight: 105.7 kg (233 lb 0.4 oz)  Body mass index is 31.6 kg/m².    SpO2: 98 %  O2 Device (Oxygen Therapy): room air      Intake/Output Summary (Last 24 hours) at 11/10/2020 0738  Last data filed at 11/9/2020 1600  Gross per 24 hour   Intake --   Output 450 ml   Net -450 ml       Lines/Drains/Airways     Peripheral Intravenous Line                 Peripheral IV - Single Lumen 11/08/20 1700 20 G Left;Posterior Hand 1 day                Scheduled Meds:   apixaban  5 mg Oral BID    diltiaZEM  120 mg Oral Daily    sotaloL  80 mg Oral BID     Continuous Infusions:   dilTIAZem Stopped (11/09/20 2300)     PRN Meds:.acetaminophen, calcium chloride IVPB, calcium chloride IVPB, calcium chloride IVPB, cyclobenzaprine, dextrose 50%, dextrose 50%, glucagon (human recombinant), glucose, glucose, magnesium oxide, magnesium sulfate IVPB, magnesium sulfate IVPB, magnesium sulfate IVPB, magnesium sulfate IVPB, melatonin, ondansetron,  polyethylene glycol, potassium chloride in water, potassium chloride in water, potassium chloride in water, potassium chloride in water, potassium chloride, potassium chloride, potassium chloride, potassium chloride, sodium chloride 0.9%, sodium phosphate IVPB, sodium phosphate IVPB, sodium phosphate IVPB, sodium phosphate IVPB, sodium phosphate IVPB     Physical Exam  HEENT: Normocephalic, atraumatic, PERRL, Conjunctiva pink, no scleral icterus.   CVS: S1S2+, RRR, no murmurs, rubs or gallops, JVP: Normal.  LUNGS: Clear  ABDOMEN: Soft, NT, BS+  EXTREMITIES: No cyanosis, clubbing or edema  NEURO: AAO X 3.       Significant Labs:   BMP:   No results for input(s): GLU, NA, K, CL, CO2, BUN, CREATININE, CALCIUM, MG in the last 48 hours., CMP   No results for input(s): NA, K, CL, CO2, GLU, BUN, CREATININE, CALCIUM, PROT, ALBUMIN, BILITOT, ALKPHOS, AST, ALT, ANIONGAP, ESTGFRAFRICA, EGFRNONAA in the last 48 hours., CBC   No results for input(s): WBC, HGB, HCT, PLT in the last 48 hours., INR   No results for input(s): INR, PROTIME in the last 48 hours., Lipid Panel No results for input(s): CHOL, HDL, LDLCALC, TRIG, CHOLHDL in the last 48 hours. and Troponin   No results for input(s): TROPONINI in the last 48 hours.    Significant Imaging: Reviewed  Assessment and Plan:     IMPRESSION:  Atrial fibrillation with rapid ventricular response.  Paroxysmal.  Currently in sinus rhythm.  Possible obstructive sleep apnea.  Patient reports snoring, daytime fatigue and his pulse her symptom he stops breathing in the middle of the night at times. Has not had the sleep study.   Hypertension.  H/o Marijuana use    PLAN:  1.  Okay to discharge home from Cardiology standpoint.  2.  Continue his home medications plus sotalol 80 mg p.o. b.i.d. which is a new medication for him.  3.  He was advised to make an appointment with Dr. Suarez for his sleep issues and possible obstructive sleep apnea.  4.  He was advised to come to the clinic on  Thursday to have an EKG done to follow-up on his QT interval.      Danielle Ramirez MD  Cardiology  [facility]

## 2020-11-10 NOTE — DISCHARGE SUMMARY
[facility] Hospital Medicine Discharge Summary    Date of Admit: 11/7/2020  Date of Discharge: 11/10/2020    Discharge to: Home or Self Care  Condition: good    Discharge Diagnoses     Problem Noted   Paroxysmal Atrial Fibrillation    Hyperlipidemia    Atrial Fibrillation With Rvr (resolved)         Patient Active Problem List   Diagnosis    Paroxysmal atrial fibrillation    Hyperlipidemia    Atrial fibrillation with RVR        Brief History of Present Illness      Nabil Urias is a 35 y.o. male who  has a past medical history of Atrial fibrillation and Hyperlipidemia.  The patient presented to [facility] on 11/7/2020 with a primary complaint of Chest Pain (since 3pm), Shortness of Breath, and Palpitations (hx of afib)  .       For the full HPI please refer to the History & Physical from this admission.    Hospital Course     Admitted with afib w/ RVR and seen in conjunction with cardiology. He converted to NSR with addition of sotalol to his med regimen. He currently feels well and at his baseline. Advised him to keep close f/u with Dr. Marroquin and his PCP as well as to return to the ED with any new, worsening, or recurrent symptoms.     Physical exam     Physical Exam  Constitutional:       Appearance: Normal appearance.   HENT:      Head: Normocephalic and atraumatic.      Mouth/Throat:      Mouth: Mucous membranes are moist.      Pharynx: Oropharynx is clear.   Eyes:      Extraocular Movements: Extraocular movements intact.      Pupils: Pupils are equal, round, and reactive to light.   Neck:      Musculoskeletal: Normal range of motion and neck supple.   Cardiovascular:      Rate and Rhythm: Normal rate and regular rhythm.      Heart sounds: No murmur.   Abdominal:      General: There is no distension.      Tenderness: There is no abdominal tenderness.   Musculoskeletal:         General: No swelling.   Skin:     General: Skin is warm and dry.      Capillary Refill: Capillary refill takes less than 2 seconds.    Neurological:      General: No focal deficit present.      Mental Status: He is alert and oriented to person, place, and time.   Psychiatric:         Mood and Affect: Mood normal.         Behavior: Behavior normal.           Discharge Medications        Medication List      START taking these medications    sotaloL 80 MG tablet  Commonly known as: BETAPACE  Take 1 tablet (80 mg total) by mouth 2 (two) times daily.        CONTINUE taking these medications    cyclobenzaprine 5 MG tablet  Commonly known as: FLEXERIL     diltiaZEM 120 mg 24 hr tablet  Commonly known as: CARDIZEM [place]     ELIQUIS ORAL           Where to Get Your Medications      These medications were sent to [facility]/pharm[facility] #[id number] - [place], [place] - [address]  [address] [address]    Hours: 24-hours Phone: [phone]   · sotaloL 80 MG tablet         Instructions:  1. Take all medications as prescribed  2. Keep all follow-up appointments  3. Return to the hospital or call your primary care physicians for any new, worsening, or recurrent symptoms.    Follow-Up:  Follow-up Information     Danielle Ramirez MD In 2 weeks.    Specialties: INTERVENTIONAL CARDIOLOGY, Cardiology, Cardiovascular Disease  Why: Cardiology follow-up  Contact information:  [address] [place] [address]  [address]  [facility] [address]  [phone]             Flash Weiner MD.    Specialty: Internal Medicine  Why: For check up s/p hospital discharge, As needed  Contact information:  [address] [place] [address] [address]  [facility] [address]  [phone]             [facility].    Specialty: Emergency Medicine  Why: As needed  Contact information:  [address] [id number] [phone]  Additional information:  1st floor                 Time spent on discharge totalled 33 minutes    Donald Hou MD  11:01 AM  11/10/2020

## 2020-11-12 ENCOUNTER — CLINICAL SUPPORT (OUTPATIENT)
Dept: CARDIOLOGY | Facility: CLINIC | Age: 35
End: 2020-11-12
Payer: MEDICAID

## 2020-11-12 DIAGNOSIS — R06.83 SNORING: ICD-10-CM

## 2020-11-12 DIAGNOSIS — I48.0 PAROXYSMAL ATRIAL FIBRILLATION: Primary | ICD-10-CM

## 2020-11-12 PROCEDURE — 93000 ELECTROCARDIOGRAM COMPLETE: CPT | Mod: S$GLB,,, | Performed by: INTERNAL MEDICINE

## 2020-11-12 PROCEDURE — 93000 EKG 12-LEAD: ICD-10-PCS | Mod: S$GLB,,, | Performed by: INTERNAL MEDICINE

## 2020-11-18 ENCOUNTER — TELEPHONE (OUTPATIENT)
Dept: CARDIOLOGY | Facility: HOSPITAL | Age: 35
End: 2020-11-18

## 2020-11-20 ENCOUNTER — TELEPHONE (OUTPATIENT)
Dept: ELECTROPHYSIOLOGY | Facility: CLINIC | Age: 35
End: 2020-11-20

## 2020-11-20 ENCOUNTER — PATIENT MESSAGE (OUTPATIENT)
Dept: ELECTROPHYSIOLOGY | Facility: CLINIC | Age: 35
End: 2020-11-20

## 2020-11-20 DIAGNOSIS — I48.0 PAROXYSMAL ATRIAL FIBRILLATION: Primary | ICD-10-CM

## 2020-11-30 ENCOUNTER — TELEPHONE (OUTPATIENT)
Dept: ELECTROPHYSIOLOGY | Facility: CLINIC | Age: 35
End: 2020-11-30

## 2020-12-01 ENCOUNTER — TELEPHONE (OUTPATIENT)
Dept: CARDIOLOGY | Facility: CLINIC | Age: 35
End: 2020-12-01

## 2020-12-01 NOTE — TELEPHONE ENCOUNTER
Pt was advised to go to ED. Pt refused and said he did not want to but was feeling better. Pt said if sx come back he will go.

## 2020-12-01 NOTE — TELEPHONE ENCOUNTER
----- Message from Mary Morgan sent at 12/1/2020 11:16 AM CST -----  Regarding: AFIB HEART RATE  patient want to speak with you he said he don't know if he in AFIB or not but his heart don't feel right his heart beat is off [phone]

## 2020-12-07 PROBLEM — Z79.01 CURRENT USE OF LONG TERM ANTICOAGULATION: Status: ACTIVE | Noted: 2020-12-07

## 2020-12-07 PROBLEM — Z51.81 ENCOUNTER FOR MONITORING SOTALOL THERAPY: Status: ACTIVE | Noted: 2020-12-07

## 2020-12-07 PROBLEM — Z79.899 ENCOUNTER FOR MONITORING SOTALOL THERAPY: Status: ACTIVE | Noted: 2020-12-07

## 2020-12-07 NOTE — PROGRESS NOTES
Subjective:     Danielle Ramirez MD    HPI    I had the pleasure of seeing Nabil Urias in consultation at your request for the evaluation of atrial fibrillation. He is a 35 year old 18-fu  with no significant past medical history who presented to [facility] in 9/2020 with AF with RVR. He underwent electrical cardioversion, and was discharged on eliquis. In 11/2020 Mr. Urias returned to [facility] with chest pains, shortness of breath, and palpitations, and was found to be back in AF with RVR. He was started on sotalol, and chemically converted to sinus rhythm. He presents to me today to discuss management options.    Since starting sotalol Mr. Urias has noted worsening fatigue, and continues to have frequent heart flutters.    Mr. Urias is not aware of a family history of AF.    My interpretation of today's ECG is    Review of Systems   Constitution: Positive for malaise/fatigue. Negative for decreased appetite, weight gain and weight loss.   HENT: Negative for sore throat.    Eyes: Negative for blurred vision.   Cardiovascular: Negative for chest pain, dyspnea on exertion, irregular heartbeat, leg swelling, near-syncope, orthopnea, palpitations, paroxysmal nocturnal dyspnea and syncope.   Respiratory: Negative for shortness of breath.    Skin: Negative for rash.   Musculoskeletal: Positive for muscle cramps. Negative for arthritis.   Gastrointestinal: Negative for abdominal pain.   Neurological: Negative for focal weakness.   Psychiatric/Behavioral: Negative for altered mental status.        Objective:    Physical Exam   Constitutional: He is oriented to person, place, and time. He appears well-developed and well-nourished. No distress.   HENT:   Head: Normocephalic and atraumatic.   Mouth/Throat: Oropharynx is clear and moist.   Eyes: Pupils are equal, round, and reactive to light. No scleral icterus.   Neck: Neck supple. No thyromegaly present.   Cardiovascular: Regular rhythm, normal heart sounds  and normal pulses. Exam reveals no gallop and no friction rub.   No murmur heard.  Pulmonary/Chest: Effort normal and breath sounds normal. He has no rales.   Abdominal: Soft. Bowel sounds are normal. He exhibits no distension. There is no abdominal tenderness.   Musculoskeletal:         General: No edema.   Neurological: He is alert and oriented to person, place, and time.   Skin: Skin is warm and dry. No rash noted.   Psychiatric: He has a normal mood and affect. His behavior is normal.   Vitals reviewed.        Assessment:       1. Paroxysmal atrial fibrillation    2. Hyperlipidemia, unspecified hyperlipidemia type    3. Encounter for monitoring sotalol therapy    4. Current use of long term anticoagulation         Plan:       In summary, Nabil Urias is a 35 year old male with a history of symptomatic PAF. His [id number] Score is 0 but for now I would like to keep him on eliquis.    We discussed in detail the pathophysiology of AF as well as the myriad of treatment options available to manage it including antiarrhythmics (specifically continuing sotalol) and catheter ablation. We specifically discussed the risks, benefits, indications, and alternatives to PVI. Risks discussed include bleeding, hematoma, vascular damage, cardiac tamponade, stroke, PV stenosis, AE fistula, phrenic nerve damage, and death.  After considering his options he has decided to proceed.     CARTO. CT scan to evaluate LA/PV anatomy. Hold Sotalol 5 days prior and eliquis AM of procedure. GABRIELA--cancel if sinus.    Thank you for allowing me to participate in the care of this patient. Please do not hesitate to call me with any questions or concerns.

## 2020-12-10 ENCOUNTER — TELEPHONE (OUTPATIENT)
Dept: ELECTROPHYSIOLOGY | Facility: CLINIC | Age: 35
End: 2020-12-10

## 2020-12-11 ENCOUNTER — OFFICE VISIT (OUTPATIENT)
Dept: CARDIOLOGY | Facility: CLINIC | Age: 35
End: 2020-12-11
Payer: MEDICAID

## 2020-12-11 ENCOUNTER — TELEPHONE (OUTPATIENT)
Dept: ELECTROPHYSIOLOGY | Facility: CLINIC | Age: 35
End: 2020-12-11

## 2020-12-11 VITALS
HEART RATE: 56 BPM | WEIGHT: 241 LBS | OXYGEN SATURATION: 98 % | BODY MASS INDEX: 32.64 KG/M2 | HEIGHT: 72 IN | SYSTOLIC BLOOD PRESSURE: 122 MMHG | DIASTOLIC BLOOD PRESSURE: 84 MMHG

## 2020-12-11 DIAGNOSIS — Z79.899 ENCOUNTER FOR MONITORING SOTALOL THERAPY: ICD-10-CM

## 2020-12-11 DIAGNOSIS — E78.5 HYPERLIPIDEMIA, UNSPECIFIED HYPERLIPIDEMIA TYPE: ICD-10-CM

## 2020-12-11 DIAGNOSIS — Z79.01 CURRENT USE OF LONG TERM ANTICOAGULATION: ICD-10-CM

## 2020-12-11 DIAGNOSIS — I48.0 PAROXYSMAL ATRIAL FIBRILLATION: Primary | ICD-10-CM

## 2020-12-11 DIAGNOSIS — Z51.81 ENCOUNTER FOR MONITORING SOTALOL THERAPY: ICD-10-CM

## 2020-12-11 PROCEDURE — 99205 OFFICE O/P NEW HI 60 MIN: CPT | Mod: S$GLB,,, | Performed by: INTERNAL MEDICINE

## 2020-12-11 PROCEDURE — 99205 PR OFFICE/OUTPT VISIT, NEW, LEVL V, 60-74 MIN: ICD-10-PCS | Mod: S$GLB,,, | Performed by: INTERNAL MEDICINE

## 2020-12-11 NOTE — LETTER
December 11, 2020      Danielle Ramirez MD  [address]  [address]  [place] [address]           [facility] [place] [facility]  [address]  [place] [address]  Phone: [phone]  Fax: [phone]          Patient: Nabil Urias   MR Number: [id number]   YOB: 1985   Date of Visit: 12/11/2020       Dear Dr. Danielle Ramirez:    Thank you for referring Nabil Urias to me for evaluation. Attached you will find relevant portions of my assessment and plan of care.    If you have questions, please do not hesitate to call me. I look forward to following Nabil Urias along with you.    Sincerely,    Daniel Handy MD    Enclosure  CC:  No Recipients    If you would like to receive this communication electronically, please contact [email or web address] or [phone] to request more information on [facility] access.    For providers and/or their staff who would like to refer a patient to [facility], please contact us through our one-stop-shop provider referral line, [facility], at [phone].    If you feel you have received this communication in error or would no longer like to receive these types of communications, please e-mail [email or web address]

## 2020-12-11 NOTE — TELEPHONE ENCOUNTER
Attempted to contact pt to schedule procedure, no answer. Left voice message with number fopr pt to return call.

## 2020-12-14 ENCOUNTER — TELEPHONE (OUTPATIENT)
Dept: ELECTROPHYSIOLOGY | Facility: CLINIC | Age: 35
End: 2020-12-14

## 2020-12-14 DIAGNOSIS — I48.0 PAROXYSMAL ATRIAL FIBRILLATION: Primary | ICD-10-CM

## 2020-12-14 DIAGNOSIS — Z01.818 PRE-OP TESTING: ICD-10-CM

## 2020-12-14 NOTE — TELEPHONE ENCOUNTER
Returned call to pt's wife. PVI scheduled for 1/21/2021        ----- Message from Vanessa Mohr sent at 12/14/2020 11:29 AM CST -----  Regarding: Appt  Spouse calling to get Pt scheduled for procedure. Thanks    Martita [phone] #

## 2020-12-16 ENCOUNTER — OFFICE VISIT (OUTPATIENT)
Dept: FAMILY MEDICINE | Facility: CLINIC | Age: 35
End: 2020-12-16
Payer: MEDICAID

## 2020-12-16 VITALS
DIASTOLIC BLOOD PRESSURE: 86 MMHG | HEIGHT: 72 IN | OXYGEN SATURATION: 99 % | WEIGHT: 245 LBS | SYSTOLIC BLOOD PRESSURE: 136 MMHG | BODY MASS INDEX: 33.18 KG/M2 | TEMPERATURE: 98 F | HEART RATE: 69 BPM

## 2020-12-16 DIAGNOSIS — R40.0 DAYTIME SOMNOLENCE: Primary | ICD-10-CM

## 2020-12-16 DIAGNOSIS — E78.2 MIXED HYPERLIPIDEMIA: ICD-10-CM

## 2020-12-16 DIAGNOSIS — F51.01 PRIMARY INSOMNIA: ICD-10-CM

## 2020-12-16 DIAGNOSIS — F41.1 GAD (GENERALIZED ANXIETY DISORDER): ICD-10-CM

## 2020-12-16 DIAGNOSIS — E66.09 CLASS 1 OBESITY DUE TO EXCESS CALORIES WITH SERIOUS COMORBIDITY AND BODY MASS INDEX (BMI) OF 33.0 TO 33.9 IN ADULT: ICD-10-CM

## 2020-12-16 DIAGNOSIS — I48.0 PAROXYSMAL ATRIAL FIBRILLATION: ICD-10-CM

## 2020-12-16 DIAGNOSIS — M79.10 MYALGIA: ICD-10-CM

## 2020-12-16 DIAGNOSIS — R25.2 LEG CRAMPING: ICD-10-CM

## 2020-12-16 PROCEDURE — 99204 PR OFFICE/OUTPT VISIT, NEW, LEVL IV, 45-59 MIN: ICD-10-PCS | Mod: S$PBB,,, | Performed by: NURSE PRACTITIONER

## 2020-12-16 PROCEDURE — 99215 OFFICE O/P EST HI 40 MIN: CPT | Performed by: NURSE PRACTITIONER

## 2020-12-16 PROCEDURE — 99204 OFFICE O/P NEW MOD 45 MIN: CPT | Mod: S$PBB,,, | Performed by: NURSE PRACTITIONER

## 2020-12-16 RX ORDER — SERTRALINE HYDROCHLORIDE 50 MG/1
50 TABLET, FILM COATED ORAL NIGHTLY
Qty: 30 TABLET | Refills: 2 | Status: ON HOLD | OUTPATIENT
Start: 2020-12-16 | End: 2021-01-21 | Stop reason: HOSPADM

## 2020-12-16 NOTE — PATIENT INSTRUCTIONS
About Arrhythmias    Electrical impulses cause the normal heart to beat 60 to 100 times a minute while at rest. These impulses come from a natural pacemaker deep inside the heart muscle. Each impulse causes the heart muscle to contract. This causes the blood to flow through the heart and out to the tissues and organs of your body.  An arrhythmia is a change from the normal speed or pattern of these electrical impulses. This can cause the heart to beat too fast (tachycardia); or too slow (bradycardia); or in an unsteady pattern (irregular rhythm).  Symptoms of arrhythmias  Different people experience arrhythmias differently. Sometimes they may not have symptoms, but just notice a change in their pulse. Symptoms can include:  · Fluttering feeling in the chest  · Shortness of breath  · Chest pain or pressure  · Neck fullness  · Lightheadedness or dizziness  · Fainting or almost fainting  · Palpitations (the sense that your heart is fluttering or beating fast or hard or irregularly)  · Tiredness, fatigue, or weakness  · Cardiac arrest  Causes of arrhythmias  Arrhythmias are most often due to heart disease such as:  · Coronary artery disease  · Heart valve disease  · Enlarged heart  · High blood pressure  · Heart failure  Other causes of  arrhythmia include:  · Certain medicines (such as asthma inhalers and decongestants)  · Some herbal supplements  · Cardiac stimulant drugs (such as cocaine, amphetamine, diet pills, certain decongestant cold medicines, caffeine, and nicotine)  · Excessive alcohol use  · Anxiety and panic disorder  · Thyroid disease  · Anemia  · Diabetes  · Sleep apnea  · Obesity  · Congenital heart disease  · Cardiac genetic diseases  Arrhythmias can often be prevented. The cause and type of arrhythmia determines the best treatment. Sometimes your doctor may want to monitor your heart rate over a 24-hour period or longer. This can help identify the cause of your arrhythmia and find the best treatment.  This can be done with a Holter monitor, a portable EKG recording device attached by wires to your chest. Or you may get an event monitor, which you can place over the skin in front of your heart to record heart rhythms. You can carry this with you as you go about your routine activities during the monitoring period. Implantable loop recorders may also be used to monitor the heart rhythm for up to 2 years. This miniature device is placed underneath the skin overlying the heart.  Home care  The following guidelines will help you care for yourself at home:  · Avoid cardiac stimulants (such as cocaine, amphetamine, diet pills, certain decongestant cold medicines, caffeine, and nicotine).  · If you smoke, stop smoking. Contact your doctor or a local stop-smoking program for help.  · Tell your doctor about any prescription, over-the-counter, or herbal medicines you take. These may be affecting your heart rhythm.  Follow-up care  Follow up with your healthcare provider, or as advised. If a Holter monitor has been recommended, contact the cardiologist you have been referred to as soon as you can  the device. Other outpatient tests may also be arranged for you at that time.  Call 911  This is the fastest and safest way to get to the emergency department. The paramedics can also start treatment on the way to the hospital, if needed.  Don't wait until your symptoms are severe to call 911. Other reasons to call 911 besides chest pain include:  · Chest, shoulder, arm, neck, or back pain  · Shortness of breath  · Feeling lightheaded, faint, or dizzy  · Unexplained fainting  · Rapid heart beat  · Slower than usual heart rate compared to your normal  · Very irregular heartbeat  · Chest pain (angina) with weakness, dizziness, heavy sweating, nausea, or vomiting  · Extreme drowsiness, or confusion  · Weakness of an arm or leg or one side of the face  · Difficulty with speech or vision  When to seek medical advice  Remember,  "things are not always like they are on TV. Sometimes it is not so obvious. You may only feel weak or just "not right." If it is not clear or if you have any doubt, call for advice.  · Seek help for chest pain, or it feels different from usual, even if your symptoms are mild.  · Don't drive yourself. Have someone else drive. If no one can drive you, call 911.  · If your doctor has given you medicines to take when you have symptoms, take them, but do not delay getting help while trying to find them.  Date Last Reviewed: 4/25/2016 © 2000-2017 [employer]. [address] [place] [address], [place] [address]. All rights reserved. This information is not intended as a substitute for professional medical care. Always follow your healthcare professional's instructions.        Anxiety Reaction  Anxiety is the feeling we all get when we think something bad might happen. It is a normal response to stress and usually causes only a mild reaction. When anxiety becomes more severe, it can interfere with daily life. In some cases, you may not even be aware of what it is youre anxious about. There may also be a genetic link or it may be a learned behavior in the home.  Both psychological and physical triggers cause stress reaction. It's often a response to fear or emotional stress, real or imagined. This stress may come from home, family, work, or social relationships.  During an anxiety reaction, you may feel:  · Helpless  · Nervous  · Depressed  · Irritable  Your body may show signs of anxiety in many ways. You may experience:  · Dry mouth  · Shakiness  · Dizziness  · Weakness  · Trouble breathing  · Breathing fast (hyperventilating)  · Chest pressure  · Sweating  · Headache  · Nausea  · Diarrhea  · Tiredness  · Inability to sleep  · Sexual problems  Home care  · Try to locate the sources of stress in your life. They may not be obvious. These may include:  ¨ Daily hassles of life (traffic jams, missed appointments, car " troubles, etc.)  ¨ Major life changes, both good (new baby, job promotion) and bad (loss of job, loss of loved one)  ¨ Overload: feeling that you have too many responsibilities and can't take care of all of them at once  ¨ Feeling helpless, feeling that your problems are beyond what youre able to solve  · Notice how your body reacts to stress. Learn to listen to your body signals. This will help you take action before the stress becomes severe.  · When you can, do something about the source of your stress. (Avoid hassles, limit the amount of change that happens in your life at one time and take a break when you feel overloaded).  · Unfortunately, many stressful situations can't be avoided. It is necessary to learn how to better manage stress. There are many proven methods that will reduce your anxiety. These include simple things like exercise, good nutrition and adequate rest. Also, there are certain techniques that are helpful:  ¨ Relaxation  ¨ Breathing exercises  ¨ Visualization  ¨ Biofeedback  ¨ Meditation  For more information about this, consult your doctor or go to a local bookstore and review the many books and tapes available on this subject.  Follow-up care  If you feel that your anxiety is not responding to self-help measures, contact your doctor or make an appointment with a counselor. You may need short-term psychological counseling and temporary medicine to help you manage stress.  Call 911  Call your healthcare provider right away if any of these occur:  · Trouble breathing  · Confusion  · Drowsiness or trouble wakening  · Fainting or loss of consciousness  · Rapid heart rate  · Seizure  · New chest pain that becomes more severe, lasts longer, or spreads into your shoulder, arm, neck, jaw, or back  When to seek medical advice  Call your healthcare provider right away if any of these occur:  · Your symptoms get worse  · Severe headache not relieved by rest and mild pain reliever  Date Last Reviewed:  9/29/2015  © 8750-8014 [employer]. [address] [place] [address], [place] [address]. All rights reserved. This information is not intended as a substitute for professional medical care. Always follow your healthcare professional's instructions.

## 2020-12-16 NOTE — PROGRESS NOTES
Subjective:       Patient ID: Nabil Urias is a 35 y.o. male.    Chief Complaint: Anxiety and Establish Care    Patient is a new patient to me and new patient to the practice.  Patient presents today for issues concerning recurrent leg cramps, hyperlipidemia, insomnia, snoring, and anxiety.  Patient is a current tobacco user (snuff).  Patient is not a smoker.    Patient recently has been diagnosed with A-fib and is on a few new medications.  When patient started the medications, the leg cramping started.  Patient's leg cramping is worst in the morning.  Patient is scheduled for a cardiac ablation in the next few weeks.  Patient is obese with a BMI of 33.23    Fatigue  This is a new problem. The current episode started more than 1 month ago. The problem occurs daily. The problem has been waxing and waning. Associated symptoms include arthralgias, fatigue and myalgias. Pertinent negatives include no abdominal pain, anorexia, change in bowel habit, chest pain, chills, congestion, coughing, diaphoresis, fever, headaches, joint swelling, nausea, neck pain, numbness, rash, sore throat, swollen glands, urinary symptoms, vertigo, visual change, vomiting or weakness. The symptoms are aggravated by walking and stress. He has tried rest and position changes for the symptoms. The treatment provided mild relief.   Hyperlipidemia  This is a recurrent problem. The current episode started more than 1 month ago. The problem is uncontrolled. Recent lipid tests were reviewed and are high. Exacerbating diseases include obesity. He has no history of diabetes or hypothyroidism. Factors aggravating his hyperlipidemia include fatty foods. Associated symptoms include myalgias. Pertinent negatives include no chest pain, focal weakness, leg pain or shortness of breath. Current antihyperlipidemic treatment includes diet change (previously on statin but stopped due to leg pain/cramping). The current treatment provides mild improvement of  lipids. Compliance problems include adherence to exercise and adherence to diet.  Risk factors for coronary artery disease include dyslipidemia, male sex, obesity, a sedentary lifestyle and stress.   Muscle Pain  This is a new problem. The current episode started more than 1 month ago. The problem occurs daily. The problem has been waxing and waning. Associated symptoms include arthralgias, fatigue and myalgias. Pertinent negatives include no abdominal pain, anorexia, change in bowel habit, chest pain, chills, congestion, coughing, diaphoresis, fever, headaches, joint swelling, nausea, neck pain, numbness, rash, sore throat, swollen glands, urinary symptoms, vertigo, visual change, vomiting or weakness. The symptoms are aggravated by standing, bending and walking. He has tried position changes, relaxation, sleep, walking and rest for the symptoms. The treatment provided mild relief.   Anxiety  Presents for initial visit. Onset was 1 to 6 months ago. The problem has been gradually worsening. Symptoms include excessive worry, insomnia, irritability, muscle tension, nervous/anxious behavior, palpitations and restlessness. Patient reports no chest pain, confusion, depressed mood, dizziness, nausea, shortness of breath or suicidal ideas. Symptoms occur most days. The severity of symptoms is moderate. The symptoms are aggravated by work stress. The quality of sleep is fair. Nighttime awakenings: occasional.     There are no known risk factors. His past medical history is significant for anxiety/panic attacks. Past treatments include lifestyle changes. The treatment provided mild relief. Compliance with prior treatments has been variable.     Review of Systems   Constitutional: Positive for activity change, fatigue and irritability. Negative for appetite change, chills, diaphoresis, fever and unexpected weight change.        Obesity   HENT: Negative for congestion, ear discharge, ear pain, hearing loss, sore throat,  trouble swallowing and voice change.    Eyes: Negative for photophobia, pain and visual disturbance.   Respiratory: Negative for cough, chest tightness and shortness of breath.         Tobacco user   Cardiovascular: Positive for palpitations. Negative for chest pain and leg swelling.        A-fib, hyperlipidemia   Gastrointestinal: Negative for abdominal pain, anorexia, change in bowel habit, constipation, diarrhea, nausea and vomiting.   Endocrine: Negative for cold intolerance and heat intolerance.   Genitourinary: Negative for difficulty urinating, dysuria and flank pain.   Musculoskeletal: Positive for arthralgias and myalgias. Negative for gait problem, joint swelling and neck pain.   Skin: Negative for rash.   Allergic/Immunologic: Negative for immunocompromised state.   Neurological: Negative for dizziness, vertigo, focal weakness, weakness, numbness and headaches.   Hematological: Negative for adenopathy.   Psychiatric/Behavioral: Negative for agitation, confusion, self-injury and suicidal ideas. The patient is nervous/anxious and has insomnia.        Past Medical History:   Diagnosis Date    Atrial fibrillation     Hyperlipidemia       Past Surgical History:   Procedure Laterality Date    TONSILLECTOMY  1990       Family History   Problem Relation Age of Onset    Heart attack Maternal Grandfather        Social History     Socioeconomic History    Marital status:      Spouse name: Not on file    Number of children: Not on file    Years of education: Not on file    Highest education level: Not on file   Occupational History    Not on file   Social Needs    Financial resource strain: Not on file    Food insecurity     Worry: Not on file     Inability: Not on file    Transportation needs     Medical: Not on file     Non-medical: Not on file   Tobacco Use    Smoking status: Never Smoker    Smokeless tobacco: Current User     Types: Snuff   Substance and Sexual Activity    Alcohol use: Yes      Alcohol/week: 4.0 standard drinks     Types: 4 Cans of beer per week     Frequency: 2-4 times a month     Drinks per session: 3 or 4     Binge frequency: Less than monthly    Drug use: Yes     Frequency: 1.0 times per week     Types: Marijuana    Sexual activity: Yes     Partners: Female   Lifestyle    Physical activity     Days per week: Not on file     Minutes per session: Not on file    Stress: To some extent   Relationships    Social connections     Talks on phone: Not on file     Gets together: Not on file     Attends Religion service: Not on file     Active member of club or organization: Not on file     Attends meetings of clubs or organizations: Not on file     Relationship status: Not on file   Other Topics Concern    Not on file   Social History Narrative    Not on file       Current Outpatient Medications   Medication Sig Dispense Refill    apixaban (ELIQUIS ORAL) Take 5 mg by mouth 2 (two) times a day.      diltiaZEM (CARDIZEM LA) 120 mg 24 hr tablet Take 120 mg by mouth once daily.      sotaloL (BETAPACE) 80 MG tablet Take 1 tablet (80 mg total) by mouth 2 (two) times daily. 60 tablet 2    cyclobenzaprine (FLEXERIL) 5 MG tablet Take 5 mg by mouth 3 (three) times daily as needed for Muscle spasms.      sertraline (ZOLOFT) 50 MG tablet Take 1 tablet (50 mg total) by mouth every evening. 30 tablet 2     No current facility-administered medications for this visit.        Review of patient's allergies indicates:  No Known Allergies  Objective:      Blood pressure 136/86, pulse 69, temperature 98.2 °F (36.8 °C), height 6' (1.829 m), weight 111.1 kg (245 lb), SpO2 99 %. Body mass index is 33.23 kg/m².   Physical Exam  Vitals signs and nursing note reviewed.   Constitutional:       General: He is not in acute distress.     Appearance: Normal appearance. He is well-developed. He is obese. He is not ill-appearing.   HENT:      Head: Normocephalic and atraumatic.      Right Ear: Ear canal and  external ear normal.      Left Ear: Ear canal and external ear normal.      Nose: Nose normal.      Mouth/Throat:      Mouth: Mucous membranes are moist.      Pharynx: Uvula midline.   Eyes:      General: Lids are normal.      Extraocular Movements: Extraocular movements intact.      Conjunctiva/sclera: Conjunctivae normal.      Pupils: Pupils are equal, round, and reactive to light.   Neck:      Musculoskeletal: Normal range of motion and neck supple.   Cardiovascular:      Rate and Rhythm: Normal rate and regular rhythm.      Pulses: Normal pulses.      Heart sounds: Normal heart sounds, S1 normal and S2 normal. No murmur. No friction rub.   Pulmonary:      Effort: Pulmonary effort is normal. No respiratory distress.      Breath sounds: Normal breath sounds.   Abdominal:      General: Bowel sounds are normal.      Palpations: Abdomen is soft.      Tenderness: There is no abdominal tenderness.   Musculoskeletal: Normal range of motion.   Lymphadenopathy:      Cervical: No cervical adenopathy.   Skin:     General: Skin is warm and dry.      Findings: No rash.   Neurological:      General: No focal deficit present.      Mental Status: He is alert and oriented to person, place, and time. Mental status is at baseline.   Psychiatric:         Mood and Affect: Mood is anxious.         Speech: Speech normal.         Behavior: Behavior normal.         Thought Content: Thought content normal.         Judgment: Judgment normal.             Assessment:       1. Daytime somnolence    2. Primary insomnia    3. Mixed hyperlipidemia    4. Paroxysmal atrial fibrillation    5. COLT (generalized anxiety disorder)    6. Myalgia    7. Leg cramping    8. Class 1 obesity due to excess calories with serious comorbidity and body mass index (BMI) of 33.0 to 33.9 in adult        Plan:       Nabil was seen today for anxiety and establish care.    Diagnoses and all orders for this visit:    Daytime somnolence  -     Ambulatory referral/consult  to Sleep Disorders; Future  Referred for sleep study.    Primary insomnia  -     Ambulatory referral/consult to Sleep Disorders; Future  -     Comprehensive Metabolic Panel; Future  -     Comprehensive Metabolic Panel    Mixed hyperlipidemia  -     Lipid Panel; Future  -     Lipid Panel  Limit red meat, butter, fried foods, cheese, and other foods that have a lot of saturated fat. Consume more: lean meats, fish, fruits, vegetables, whole grains, beans, lentils, and nuts.  Weight loss, and 30-45 min of cardiovascular exercise daily.     Paroxysmal atrial fibrillation  -     CBC Auto Differential; Future  -     Comprehensive Metabolic Panel; Future  -     Urinalysis; Future  -     CBC Auto Differential  -     Comprehensive Metabolic Panel  -     Urinalysis  Continue follow up with cardiology and obtain cardiac ablation.     COLT (generalized anxiety disorder)  -     sertraline (ZOLOFT) 50 MG tablet; Take 1 tablet (50 mg total) by mouth every evening.  Initially starting on zoloft.  Take 1/2 tablet PO for 1 week and then take a whole tablet nightly.    Myalgia  -     C-reactive protein; Future  -     CK; Future  -     Sedimentation rate, automated; Future  -     ROSSY IFA Screen With Reflex To Titer and Pattern; Future  -     C-reactive protein  -     CK  -     Sedimentation rate, automated  -     ROSSY IFA Screen With Reflex To Titer and Pattern    Leg cramping  -     C-reactive protein; Future  -     CK; Future  -     Sedimentation rate, automated; Future  -     ROSSY IFA Screen With Reflex To Titer and Pattern; Future  -     C-reactive protein  -     CK  -     Sedimentation rate, automated  -     ROSSY IFA Screen With Reflex To Titer and Pattern    Class 1 obesity due to excess calories with serious comorbidity and body mass index (BMI) of 33.0 to 33.9 in adult  The patient is asked to make an attempt to improve diet and exercise patterns to aid in medical management of this problem.       Follow up with me in 6 weeks for  anxiety and lab review since start of zoloft.

## 2021-01-03 ENCOUNTER — PATIENT MESSAGE (OUTPATIENT)
Dept: CARDIOLOGY | Facility: CLINIC | Age: 36
End: 2021-01-03

## 2021-01-07 ENCOUNTER — PATIENT MESSAGE (OUTPATIENT)
Dept: ELECTROPHYSIOLOGY | Facility: CLINIC | Age: 36
End: 2021-01-07

## 2021-01-07 ENCOUNTER — OFFICE VISIT (OUTPATIENT)
Dept: CARDIOLOGY | Facility: CLINIC | Age: 36
End: 2021-01-07
Payer: MEDICAID

## 2021-01-07 VITALS
BODY MASS INDEX: 33.18 KG/M2 | WEIGHT: 245 LBS | DIASTOLIC BLOOD PRESSURE: 80 MMHG | HEART RATE: 87 BPM | OXYGEN SATURATION: 98 % | RESPIRATION RATE: 16 BRPM | HEIGHT: 72 IN | SYSTOLIC BLOOD PRESSURE: 140 MMHG

## 2021-01-07 DIAGNOSIS — I48.0 PAROXYSMAL ATRIAL FIBRILLATION: Primary | ICD-10-CM

## 2021-01-07 DIAGNOSIS — I48.91 ATRIAL FIBRILLATION WITH RVR: Primary | ICD-10-CM

## 2021-01-07 DIAGNOSIS — Z01.818 PRE-OP TESTING: ICD-10-CM

## 2021-01-07 DIAGNOSIS — E78.2 MIXED HYPERLIPIDEMIA: ICD-10-CM

## 2021-01-07 PROCEDURE — 99213 OFFICE O/P EST LOW 20 MIN: CPT | Mod: S$GLB,,, | Performed by: INTERNAL MEDICINE

## 2021-01-07 PROCEDURE — 99213 PR OFFICE/OUTPT VISIT, EST, LEVL III, 20-29 MIN: ICD-10-PCS | Mod: S$GLB,,, | Performed by: INTERNAL MEDICINE

## 2021-01-12 ENCOUNTER — HOSPITAL ENCOUNTER (OUTPATIENT)
Dept: RADIOLOGY | Facility: HOSPITAL | Age: 36
Discharge: HOME | End: 2021-01-12
Attending: INTERNAL MEDICINE
Payer: MEDICAID

## 2021-01-12 DIAGNOSIS — I48.0 PAROXYSMAL ATRIAL FIBRILLATION: ICD-10-CM

## 2021-01-12 LAB
CREAT SERPL-MCNC: 1.2 MG/DL (ref 0.5–1.4)
SAMPLE: NORMAL

## 2021-01-12 PROCEDURE — 71275 CTA CHEST NON CORONARY: ICD-10-PCS | Mod: 26,,, | Performed by: RADIOLOGY

## 2021-01-12 PROCEDURE — 25500020 PHARM REV CODE 255: Performed by: INTERNAL MEDICINE

## 2021-01-12 PROCEDURE — 71275 CT ANGIOGRAPHY CHEST: CPT | Mod: TC

## 2021-01-12 PROCEDURE — 71275 CT ANGIOGRAPHY CHEST: CPT | Mod: 26,,, | Performed by: RADIOLOGY

## 2021-01-12 RX ADMIN — IOHEXOL 100 ML: 350 INJECTION, SOLUTION INTRAVENOUS at 11:01

## 2021-01-18 ENCOUNTER — LAB VISIT (OUTPATIENT)
Dept: PRIMARY CARE CLINIC | Facility: CLINIC | Age: 36
End: 2021-01-18
Payer: MEDICAID

## 2021-01-18 DIAGNOSIS — Z01.818 PRE-OP TESTING: ICD-10-CM

## 2021-01-18 PROCEDURE — U0003 INFECTIOUS AGENT DETECTION BY NUCLEIC ACID (DNA OR RNA); SEVERE ACUTE RESPIRATORY SYNDROME CORONAVIRUS 2 (SARS-COV-2) (CORONAVIRUS DISEASE [COVID-19]), AMPLIFIED PROBE TECHNIQUE, MAKING USE OF HIGH THROUGHPUT TECHNOLOGIES AS DESCRIBED BY CMS-2020-01-R: HCPCS

## 2021-01-19 LAB — SARS-COV-2 RNA RESP QL NAA+PROBE: NOT DETECTED

## 2021-01-20 ENCOUNTER — TELEPHONE (OUTPATIENT)
Dept: ELECTROPHYSIOLOGY | Facility: CLINIC | Age: 36
End: 2021-01-20

## 2021-01-20 ENCOUNTER — ANESTHESIA EVENT (OUTPATIENT)
Dept: MEDSURG UNIT | Facility: HOSPITAL | Age: 36
End: 2021-01-20
Payer: MEDICAID

## 2021-01-21 ENCOUNTER — HOSPITAL ENCOUNTER (OUTPATIENT)
Facility: HOSPITAL | Age: 36
Discharge: HOME | End: 2021-01-21
Attending: INTERNAL MEDICINE | Admitting: INTERNAL MEDICINE
Payer: MEDICAID

## 2021-01-21 ENCOUNTER — ANESTHESIA (OUTPATIENT)
Dept: MEDSURG UNIT | Facility: HOSPITAL | Age: 36
End: 2021-01-21
Payer: MEDICAID

## 2021-01-21 VITALS
HEART RATE: 82 BPM | WEIGHT: 243 LBS | SYSTOLIC BLOOD PRESSURE: 121 MMHG | BODY MASS INDEX: 32.91 KG/M2 | TEMPERATURE: 98 F | DIASTOLIC BLOOD PRESSURE: 64 MMHG | HEIGHT: 72 IN | RESPIRATION RATE: 18 BRPM | OXYGEN SATURATION: 95 %

## 2021-01-21 DIAGNOSIS — I48.0 PAROXYSMAL ATRIAL FIBRILLATION: Primary | ICD-10-CM

## 2021-01-21 DIAGNOSIS — I48.11 LONGSTANDING PERSISTENT ATRIAL FIBRILLATION: ICD-10-CM

## 2021-01-21 DIAGNOSIS — I48.91 ATRIAL FIBRILLATION: ICD-10-CM

## 2021-01-21 LAB
ABO + RH BLD: NORMAL
BLD GP AB SCN CELLS X3 SERPL QL: NORMAL
POC ACTIVATED CLOTTING TIME K: 125 SEC (ref 74–137)
POC ACTIVATED CLOTTING TIME K: 136 SEC (ref 74–137)
POC ACTIVATED CLOTTING TIME K: 313 SEC (ref 74–137)
POC ACTIVATED CLOTTING TIME K: 313 SEC (ref 74–137)
POC ACTIVATED CLOTTING TIME K: 351 SEC (ref 74–137)
SAMPLE: ABNORMAL
SAMPLE: NORMAL
SAMPLE: NORMAL

## 2021-01-21 PROCEDURE — 93005 ELECTROCARDIOGRAM TRACING: CPT

## 2021-01-21 PROCEDURE — 93662 PR INTRACARD ECHO, THER/DX INTERVENT: ICD-10-PCS | Mod: 26,,, | Performed by: INTERNAL MEDICINE

## 2021-01-21 PROCEDURE — 37000008 HC ANESTHESIA 1ST 15 MINUTES: Performed by: INTERNAL MEDICINE

## 2021-01-21 PROCEDURE — 86900 BLOOD TYPING SEROLOGIC ABO: CPT

## 2021-01-21 PROCEDURE — 63600175 PHARM REV CODE 636 W HCPCS: Performed by: ANESTHESIOLOGY

## 2021-01-21 PROCEDURE — 37000009 HC ANESTHESIA EA ADD 15 MINS: Performed by: INTERNAL MEDICINE

## 2021-01-21 PROCEDURE — 93662 INTRACARDIAC ECG (ICE): CPT

## 2021-01-21 PROCEDURE — 93656 COMPRE EP EVAL ABLTJ ATR FIB: CPT

## 2021-01-21 PROCEDURE — D9220A PRA ANESTHESIA: ICD-10-PCS | Mod: ,,, | Performed by: ANESTHESIOLOGY

## 2021-01-21 PROCEDURE — 25000003 PHARM REV CODE 250: Performed by: INTERNAL MEDICINE

## 2021-01-21 PROCEDURE — 93613 INTRACARDIAC EPHYS 3D MAPG: CPT

## 2021-01-21 PROCEDURE — 00537 ANES CARDIAC EP PROCEDURES: CPT | Performed by: INTERNAL MEDICINE

## 2021-01-21 PROCEDURE — C9113 INJ PANTOPRAZOLE SODIUM, VIA: HCPCS | Performed by: NURSE PRACTITIONER

## 2021-01-21 PROCEDURE — 25000003 PHARM REV CODE 250: Performed by: NURSE PRACTITIONER

## 2021-01-21 PROCEDURE — 93010 ELECTROCARDIOGRAM REPORT: CPT | Mod: 77,ICN,, | Performed by: INTERNAL MEDICINE

## 2021-01-21 PROCEDURE — 63600175 PHARM REV CODE 636 W HCPCS: Performed by: NURSE PRACTITIONER

## 2021-01-21 PROCEDURE — C1730 CATH, EP, 19 OR FEW ELECT: HCPCS | Performed by: INTERNAL MEDICINE

## 2021-01-21 PROCEDURE — C1766 INTRO/SHEATH,STRBLE,NON-PEEL: HCPCS | Performed by: INTERNAL MEDICINE

## 2021-01-21 PROCEDURE — 93010 EKG 12-LEAD: ICD-10-PCS | Mod: 77,ICN,, | Performed by: INTERNAL MEDICINE

## 2021-01-21 PROCEDURE — C1731 CATH, EP, 20 OR MORE ELEC: HCPCS | Performed by: INTERNAL MEDICINE

## 2021-01-21 PROCEDURE — 25000003 PHARM REV CODE 250: Performed by: NURSE ANESTHETIST, CERTIFIED REGISTERED

## 2021-01-21 PROCEDURE — C1894 INTRO/SHEATH, NON-LASER: HCPCS | Performed by: INTERNAL MEDICINE

## 2021-01-21 PROCEDURE — D9220A PRA ANESTHESIA: Mod: ,,, | Performed by: ANESTHESIOLOGY

## 2021-01-21 PROCEDURE — 36620 INSERTION CATHETER ARTERY: CPT | Mod: 59,,, | Performed by: ANESTHESIOLOGY

## 2021-01-21 PROCEDURE — 93613 INTRACARDIAC EPHYS 3D MAPG: CPT | Mod: ,,, | Performed by: INTERNAL MEDICINE

## 2021-01-21 PROCEDURE — 93005 ELECTROCARDIOGRAM TRACING: CPT | Mod: 59

## 2021-01-21 PROCEDURE — 93613 PR INTRACARD ELECTROPHYS 3-DIMENS MAPPING: ICD-10-PCS | Mod: ,,, | Performed by: INTERNAL MEDICINE

## 2021-01-21 PROCEDURE — 93662 INTRACARDIAC ECG (ICE): CPT | Mod: 26,,, | Performed by: INTERNAL MEDICINE

## 2021-01-21 PROCEDURE — 27201423 OPTIME MED/SURG SUP & DEVICES STERILE SUPPLY: Performed by: INTERNAL MEDICINE

## 2021-01-21 PROCEDURE — 36620 PR INSERT CATH,ART,PERCUT,SHORTTERM: ICD-10-PCS | Mod: 59,,, | Performed by: ANESTHESIOLOGY

## 2021-01-21 PROCEDURE — 93010 ELECTROCARDIOGRAM REPORT: CPT | Mod: ,,, | Performed by: INTERNAL MEDICINE

## 2021-01-21 PROCEDURE — 27201037 HC PRESSURE MONITORING SET UP

## 2021-01-21 PROCEDURE — 93656 PR ELECTROPHYS EVAL, COMPREHEN, W/ABLATION OF ATRIAL FIBR: ICD-10-PCS | Mod: ,,, | Performed by: INTERNAL MEDICINE

## 2021-01-21 PROCEDURE — 93010 EKG 12-LEAD: ICD-10-PCS | Mod: ,,, | Performed by: INTERNAL MEDICINE

## 2021-01-21 PROCEDURE — 27800505 HC CATH, RADIAL ARTERY KIT: Performed by: ANESTHESIOLOGY

## 2021-01-21 PROCEDURE — 63600175 PHARM REV CODE 636 W HCPCS: Performed by: NURSE ANESTHETIST, CERTIFIED REGISTERED

## 2021-01-21 PROCEDURE — C1753 CATH, INTRAVAS ULTRASOUND: HCPCS | Performed by: INTERNAL MEDICINE

## 2021-01-21 PROCEDURE — C1732 CATH, EP, DIAG/ABL, 3D/VECT: HCPCS | Performed by: INTERNAL MEDICINE

## 2021-01-21 PROCEDURE — 93656 COMPRE EP EVAL ABLTJ ATR FIB: CPT | Mod: ,,, | Performed by: INTERNAL MEDICINE

## 2021-01-21 RX ORDER — HYDROMORPHONE HYDROCHLORIDE 1 MG/ML
0.2 INJECTION, SOLUTION INTRAMUSCULAR; INTRAVENOUS; SUBCUTANEOUS
Status: DISCONTINUED | OUTPATIENT
Start: 2021-01-21 | End: 2021-01-21 | Stop reason: HOSPADM

## 2021-01-21 RX ORDER — ACETAMINOPHEN 325 MG/1
650 TABLET ORAL EVERY 4 HOURS PRN
Status: DISCONTINUED | OUTPATIENT
Start: 2021-01-21 | End: 2021-01-21 | Stop reason: HOSPADM

## 2021-01-21 RX ORDER — PROTAMINE SULFATE 10 MG/ML
INJECTION, SOLUTION INTRAVENOUS
Status: DISCONTINUED | OUTPATIENT
Start: 2021-01-21 | End: 2021-01-21

## 2021-01-21 RX ORDER — LORAZEPAM 2 MG/ML
0.25 INJECTION INTRAMUSCULAR ONCE AS NEEDED
Status: COMPLETED | OUTPATIENT
Start: 2021-01-21 | End: 2021-01-21

## 2021-01-21 RX ORDER — PHENYLEPHRINE HYDROCHLORIDE 10 MG/ML
INJECTION INTRAVENOUS
Status: DISCONTINUED | OUTPATIENT
Start: 2021-01-21 | End: 2021-01-21

## 2021-01-21 RX ORDER — FENTANYL CITRATE 50 UG/ML
25 INJECTION, SOLUTION INTRAMUSCULAR; INTRAVENOUS EVERY 5 MIN PRN
Status: COMPLETED | OUTPATIENT
Start: 2021-01-21 | End: 2021-01-21

## 2021-01-21 RX ORDER — PANTOPRAZOLE SODIUM 40 MG/10ML
40 INJECTION, POWDER, LYOPHILIZED, FOR SOLUTION INTRAVENOUS ONCE
Status: COMPLETED | OUTPATIENT
Start: 2021-01-21 | End: 2021-01-21

## 2021-01-21 RX ORDER — HEPARIN SOD,PORCINE/0.9 % NACL 1000/500ML
INTRAVENOUS SOLUTION INTRAVENOUS
Status: DISCONTINUED | OUTPATIENT
Start: 2021-01-21 | End: 2021-01-21 | Stop reason: HOSPADM

## 2021-01-21 RX ORDER — SUCCINYLCHOLINE CHLORIDE 20 MG/ML
INJECTION INTRAMUSCULAR; INTRAVENOUS
Status: DISCONTINUED | OUTPATIENT
Start: 2021-01-21 | End: 2021-01-21

## 2021-01-21 RX ORDER — LIDOCAINE HYDROCHLORIDE 20 MG/ML
INJECTION INTRAVENOUS
Status: DISCONTINUED | OUTPATIENT
Start: 2021-01-21 | End: 2021-01-21

## 2021-01-21 RX ORDER — SODIUM CHLORIDE 0.9 % (FLUSH) 0.9 %
10 SYRINGE (ML) INJECTION
Status: DISCONTINUED | OUTPATIENT
Start: 2021-01-21 | End: 2021-01-21 | Stop reason: HOSPADM

## 2021-01-21 RX ORDER — DEXAMETHASONE SODIUM PHOSPHATE 4 MG/ML
INJECTION, SOLUTION INTRA-ARTICULAR; INTRALESIONAL; INTRAMUSCULAR; INTRAVENOUS; SOFT TISSUE
Status: DISCONTINUED | OUTPATIENT
Start: 2021-01-21 | End: 2021-01-21

## 2021-01-21 RX ORDER — PANTOPRAZOLE SODIUM 40 MG/1
40 TABLET, DELAYED RELEASE ORAL DAILY
Qty: 30 TABLET | Refills: 0 | Status: SHIPPED | OUTPATIENT
Start: 2021-01-21 | End: 2021-04-06

## 2021-01-21 RX ORDER — MIDAZOLAM HYDROCHLORIDE 1 MG/ML
INJECTION, SOLUTION INTRAMUSCULAR; INTRAVENOUS
Status: DISCONTINUED | OUTPATIENT
Start: 2021-01-21 | End: 2021-01-21

## 2021-01-21 RX ORDER — HYDROMORPHONE HYDROCHLORIDE 1 MG/ML
0.2 INJECTION, SOLUTION INTRAMUSCULAR; INTRAVENOUS; SUBCUTANEOUS EVERY 5 MIN PRN
Status: DISCONTINUED | OUTPATIENT
Start: 2021-01-21 | End: 2021-01-21 | Stop reason: HOSPADM

## 2021-01-21 RX ORDER — CYCLOBENZAPRINE HCL 5 MG
5 TABLET ORAL 3 TIMES DAILY PRN
Qty: 45 TABLET | Refills: 0 | Status: SHIPPED | OUTPATIENT
Start: 2021-01-21 | End: 2021-02-20

## 2021-01-21 RX ORDER — ONDANSETRON 2 MG/ML
4 INJECTION INTRAMUSCULAR; INTRAVENOUS DAILY PRN
Status: DISCONTINUED | OUTPATIENT
Start: 2021-01-21 | End: 2021-01-21 | Stop reason: HOSPADM

## 2021-01-21 RX ORDER — HEPARIN SODIUM 10000 [USP'U]/100ML
INJECTION, SOLUTION INTRAVENOUS CONTINUOUS PRN
Status: DISCONTINUED | OUTPATIENT
Start: 2021-01-21 | End: 2021-01-21

## 2021-01-21 RX ORDER — LIDOCAINE HYDROCHLORIDE 20 MG/ML
INJECTION, SOLUTION INFILTRATION; PERINEURAL
Status: DISCONTINUED | OUTPATIENT
Start: 2021-01-21 | End: 2021-01-21 | Stop reason: HOSPADM

## 2021-01-21 RX ORDER — PANTOPRAZOLE SODIUM 40 MG/1
40 TABLET, DELAYED RELEASE ORAL DAILY
Qty: 30 TABLET | Refills: 0 | Status: SHIPPED | OUTPATIENT
Start: 2021-01-21 | End: 2021-01-21 | Stop reason: SDUPTHER

## 2021-01-21 RX ORDER — HYDROMORPHONE HYDROCHLORIDE 1 MG/ML
0.2 INJECTION, SOLUTION INTRAMUSCULAR; INTRAVENOUS; SUBCUTANEOUS ONCE
Status: DISCONTINUED | OUTPATIENT
Start: 2021-01-21 | End: 2021-01-21

## 2021-01-21 RX ORDER — ONDANSETRON 2 MG/ML
INJECTION INTRAMUSCULAR; INTRAVENOUS
Status: DISCONTINUED | OUTPATIENT
Start: 2021-01-21 | End: 2021-01-21

## 2021-01-21 RX ORDER — CYCLOBENZAPRINE HCL 5 MG
10 TABLET ORAL 3 TIMES DAILY PRN
Status: DISCONTINUED | OUTPATIENT
Start: 2021-01-21 | End: 2021-01-21 | Stop reason: HOSPADM

## 2021-01-21 RX ORDER — HEPARIN SODIUM 1000 [USP'U]/ML
INJECTION, SOLUTION INTRAVENOUS; SUBCUTANEOUS
Status: DISCONTINUED | OUTPATIENT
Start: 2021-01-21 | End: 2021-01-21

## 2021-01-21 RX ORDER — FENTANYL CITRATE 50 UG/ML
INJECTION, SOLUTION INTRAMUSCULAR; INTRAVENOUS
Status: DISCONTINUED | OUTPATIENT
Start: 2021-01-21 | End: 2021-01-21

## 2021-01-21 RX ORDER — HYDROMORPHONE HYDROCHLORIDE 1 MG/ML
0.2 INJECTION, SOLUTION INTRAMUSCULAR; INTRAVENOUS; SUBCUTANEOUS EVERY 6 HOURS PRN
Status: DISCONTINUED | OUTPATIENT
Start: 2021-01-21 | End: 2021-01-21

## 2021-01-21 RX ORDER — PROPOFOL 10 MG/ML
VIAL (ML) INTRAVENOUS
Status: DISCONTINUED | OUTPATIENT
Start: 2021-01-21 | End: 2021-01-21

## 2021-01-21 RX ORDER — FAMOTIDINE 10 MG/ML
INJECTION INTRAVENOUS
Status: DISCONTINUED | OUTPATIENT
Start: 2021-01-21 | End: 2021-01-21

## 2021-01-21 RX ADMIN — MIDAZOLAM 2 MG: 1 INJECTION INTRAMUSCULAR; INTRAVENOUS at 12:01

## 2021-01-21 RX ADMIN — FAMOTIDINE 20 MG: 10 INJECTION, SOLUTION INTRAVENOUS at 12:01

## 2021-01-21 RX ADMIN — FENTANYL CITRATE 25 MCG: 50 INJECTION, SOLUTION INTRAMUSCULAR; INTRAVENOUS at 04:01

## 2021-01-21 RX ADMIN — PROTAMINE SULFATE 10 MG: 10 INJECTION, SOLUTION INTRAVENOUS at 02:01

## 2021-01-21 RX ADMIN — HYDROMORPHONE HYDROCHLORIDE 0.2 MG: 1 INJECTION, SOLUTION INTRAMUSCULAR; INTRAVENOUS; SUBCUTANEOUS at 04:01

## 2021-01-21 RX ADMIN — ACETAMINOPHEN 650 MG: 325 TABLET ORAL at 03:01

## 2021-01-21 RX ADMIN — CYCLOBENZAPRINE HYDROCHLORIDE 10 MG: 5 TABLET, FILM COATED ORAL at 05:01

## 2021-01-21 RX ADMIN — PROTAMINE SULFATE 20 MG: 10 INJECTION, SOLUTION INTRAVENOUS at 03:01

## 2021-01-21 RX ADMIN — HEPARIN SODIUM 22000 UNITS: 1000 INJECTION INTRAVENOUS; SUBCUTANEOUS at 01:01

## 2021-01-21 RX ADMIN — PROPOFOL 200 MG: 10 INJECTION, EMULSION INTRAVENOUS at 12:01

## 2021-01-21 RX ADMIN — FENTANYL CITRATE 25 MCG: 50 INJECTION, SOLUTION INTRAMUSCULAR; INTRAVENOUS at 03:01

## 2021-01-21 RX ADMIN — PHENYLEPHRINE HYDROCHLORIDE 100 MCG: 10 INJECTION, SOLUTION INTRAMUSCULAR; INTRAVENOUS; SUBCUTANEOUS at 01:01

## 2021-01-21 RX ADMIN — LORAZEPAM 0.25 MG: 2 INJECTION, SOLUTION INTRAMUSCULAR; INTRAVENOUS at 04:01

## 2021-01-21 RX ADMIN — HEPARIN SODIUM AND DEXTROSE 12 UNITS/KG/HR: 10000; 5 INJECTION INTRAVENOUS at 01:01

## 2021-01-21 RX ADMIN — PANTOPRAZOLE SODIUM 40 MG: 40 INJECTION, POWDER, FOR SOLUTION INTRAVENOUS at 05:01

## 2021-01-21 RX ADMIN — PROPOFOL 100 MG: 10 INJECTION, EMULSION INTRAVENOUS at 01:01

## 2021-01-21 RX ADMIN — DEXAMETHASONE SODIUM PHOSPHATE 4 MG: 4 INJECTION, SOLUTION INTRAMUSCULAR; INTRAVENOUS at 12:01

## 2021-01-21 RX ADMIN — HYDROMORPHONE HYDROCHLORIDE 0.2 MG: 1 INJECTION, SOLUTION INTRAMUSCULAR; INTRAVENOUS; SUBCUTANEOUS at 07:01

## 2021-01-21 RX ADMIN — ONDANSETRON 4 MG: 2 INJECTION INTRAMUSCULAR; INTRAVENOUS at 03:01

## 2021-01-21 RX ADMIN — SODIUM CHLORIDE 0.25 MCG/KG/MIN: 9 INJECTION, SOLUTION INTRAVENOUS at 01:01

## 2021-01-21 RX ADMIN — ONDANSETRON 4 MG: 2 INJECTION, SOLUTION INTRAMUSCULAR; INTRAVENOUS at 02:01

## 2021-01-21 RX ADMIN — HEPARIN SODIUM 3000 UNITS: 1000 INJECTION INTRAVENOUS; SUBCUTANEOUS at 02:01

## 2021-01-21 RX ADMIN — FENTANYL CITRATE 100 MCG: 50 INJECTION INTRAMUSCULAR; INTRAVENOUS at 12:01

## 2021-01-21 RX ADMIN — SUCCINYLCHOLINE CHLORIDE 200 MG: 20 INJECTION, SOLUTION INTRAMUSCULAR; INTRAVENOUS at 12:01

## 2021-01-21 RX ADMIN — LIDOCAINE HYDROCHLORIDE 100 MG: 20 INJECTION, SOLUTION INTRAVENOUS at 12:01

## 2021-01-21 RX ADMIN — SODIUM CHLORIDE: 0.9 INJECTION, SOLUTION INTRAVENOUS at 12:01

## 2021-01-28 LAB
ALBUMIN SERPL-MCNC: 4.5 G/DL (ref 4–5)
ALBUMIN/GLOB SERPL: 1.9 {RATIO} (ref 1.2–2.2)
ALP SERPL-CCNC: 63 IU/L (ref 39–117)
ALT SERPL-CCNC: 20 IU/L (ref 0–44)
ANA SER QL: NEGATIVE
APPEARANCE UR: CLEAR
AST SERPL-CCNC: 22 IU/L (ref 0–40)
BASOPHILS # BLD AUTO: 0.1 X10E3/UL (ref 0–0.2)
BASOPHILS NFR BLD AUTO: 1 %
BILIRUB SERPL-MCNC: 0.7 MG/DL (ref 0–1.2)
BILIRUB UR QL STRIP: NEGATIVE
BUN SERPL-MCNC: 17 MG/DL (ref 6–20)
BUN/CREAT SERPL: 14 (ref 9–20)
CALCIUM SERPL-MCNC: 9.7 MG/DL (ref 8.7–10.2)
CHLORIDE SERPL-SCNC: 100 MMOL/L (ref 96–106)
CHOLEST SERPL-MCNC: 167 MG/DL (ref 100–199)
CK SERPL-CCNC: 162 U/L (ref 49–439)
CO2 SERPL-SCNC: 27 MMOL/L (ref 20–29)
COLOR UR: YELLOW
CREAT SERPL-MCNC: 1.18 MG/DL (ref 0.76–1.27)
CRP SERPL-MCNC: 5 MG/L (ref 0–10)
EOSINOPHIL # BLD AUTO: 0.6 X10E3/UL (ref 0–0.4)
EOSINOPHIL NFR BLD AUTO: 7 %
ERYTHROCYTE [DISTWIDTH] IN BLOOD BY AUTOMATED COUNT: 12.7 % (ref 11.6–15.4)
ERYTHROCYTE [SEDIMENTATION RATE] IN BLOOD BY WESTERGREN METHOD: 4 MM/HR (ref 0–15)
GLOBULIN SER CALC-MCNC: 2.4 G/DL (ref 1.5–4.5)
GLUCOSE SERPL-MCNC: 103 MG/DL (ref 65–99)
GLUCOSE UR QL: NEGATIVE
HCT VFR BLD AUTO: 44.3 % (ref 37.5–51)
HDLC SERPL-MCNC: 38 MG/DL
HGB BLD-MCNC: 15 G/DL (ref 13–17.7)
HGB UR QL STRIP: NEGATIVE
IMM GRANULOCYTES # BLD AUTO: 0.1 X10E3/UL (ref 0–0.1)
IMM GRANULOCYTES NFR BLD AUTO: 1 %
KETONES UR QL STRIP: NEGATIVE
LDLC SERPL CALC-MCNC: 94 MG/DL (ref 0–99)
LEUKOCYTE ESTERASE UR QL STRIP: NEGATIVE
LYMPHOCYTES # BLD AUTO: 2.3 X10E3/UL (ref 0.7–3.1)
LYMPHOCYTES NFR BLD AUTO: 27 %
MCH RBC QN AUTO: 32.2 PG (ref 26.6–33)
MCHC RBC AUTO-ENTMCNC: 33.9 G/DL (ref 31.5–35.7)
MCV RBC AUTO: 95 FL (ref 79–97)
MICRO URNS: NORMAL
MONOCYTES # BLD AUTO: 0.7 X10E3/UL (ref 0.1–0.9)
MONOCYTES NFR BLD AUTO: 8 %
NEUTROPHILS # BLD AUTO: 4.8 X10E3/UL (ref 1.4–7)
NEUTROPHILS NFR BLD AUTO: 56 %
NITRITE UR QL STRIP: NEGATIVE
PH UR STRIP: 6.5 [PH] (ref 5–7.5)
PLATELET # BLD AUTO: 288 X10E3/UL (ref 150–450)
POTASSIUM SERPL-SCNC: 4.9 MMOL/L (ref 3.5–5.2)
PROT SERPL-MCNC: 6.9 G/DL (ref 6–8.5)
PROT UR QL STRIP: NEGATIVE
RBC # BLD AUTO: 4.66 X10E6/UL (ref 4.14–5.8)
SODIUM SERPL-SCNC: 139 MMOL/L (ref 134–144)
SP GR UR: 1.01 (ref 1–1.03)
TRIGL SERPL-MCNC: 206 MG/DL (ref 0–149)
UROBILINOGEN UR STRIP-MCNC: 0.2 MG/DL (ref 0.2–1)
VLDLC SERPL CALC-MCNC: 35 MG/DL (ref 5–40)
WBC # BLD AUTO: 8.6 X10E3/UL (ref 3.4–10.8)

## 2021-01-29 ENCOUNTER — OFFICE VISIT (OUTPATIENT)
Dept: FAMILY MEDICINE | Facility: CLINIC | Age: 36
End: 2021-01-29
Payer: MEDICAID

## 2021-01-29 VITALS
BODY MASS INDEX: 32.78 KG/M2 | OXYGEN SATURATION: 98 % | HEART RATE: 74 BPM | SYSTOLIC BLOOD PRESSURE: 126 MMHG | WEIGHT: 242 LBS | DIASTOLIC BLOOD PRESSURE: 84 MMHG | TEMPERATURE: 98 F | HEIGHT: 72 IN

## 2021-01-29 DIAGNOSIS — R40.0 DAYTIME SOMNOLENCE: ICD-10-CM

## 2021-01-29 DIAGNOSIS — E66.09 CLASS 1 OBESITY DUE TO EXCESS CALORIES WITH SERIOUS COMORBIDITY AND BODY MASS INDEX (BMI) OF 32.0 TO 32.9 IN ADULT: ICD-10-CM

## 2021-01-29 DIAGNOSIS — I48.0 PAROXYSMAL ATRIAL FIBRILLATION: ICD-10-CM

## 2021-01-29 DIAGNOSIS — F41.1 GAD (GENERALIZED ANXIETY DISORDER): Primary | ICD-10-CM

## 2021-01-29 DIAGNOSIS — E78.2 MIXED HYPERLIPIDEMIA: ICD-10-CM

## 2021-01-29 DIAGNOSIS — F51.01 PRIMARY INSOMNIA: ICD-10-CM

## 2021-01-29 PROCEDURE — 99214 OFFICE O/P EST MOD 30 MIN: CPT | Mod: S$PBB,,, | Performed by: NURSE PRACTITIONER

## 2021-01-29 PROCEDURE — 99214 PR OFFICE/OUTPT VISIT, EST, LEVL IV, 30-39 MIN: ICD-10-PCS | Mod: S$PBB,,, | Performed by: NURSE PRACTITIONER

## 2021-01-29 PROCEDURE — 99214 OFFICE O/P EST MOD 30 MIN: CPT | Performed by: NURSE PRACTITIONER

## 2021-01-29 RX ORDER — BUSPIRONE HYDROCHLORIDE 7.5 MG/1
7.5 TABLET ORAL 3 TIMES DAILY
Qty: 90 TABLET | Refills: 2 | Status: SHIPPED | OUTPATIENT
Start: 2021-01-29 | End: 2021-04-06

## 2021-02-09 ENCOUNTER — HOSPITAL ENCOUNTER (EMERGENCY)
Facility: HOSPITAL | Age: 36
Discharge: HOME | End: 2021-02-09
Attending: EMERGENCY MEDICINE
Payer: MEDICAID

## 2021-02-09 VITALS
HEIGHT: 72 IN | RESPIRATION RATE: 18 BRPM | BODY MASS INDEX: 32.51 KG/M2 | WEIGHT: 240 LBS | OXYGEN SATURATION: 100 % | HEART RATE: 79 BPM | DIASTOLIC BLOOD PRESSURE: 76 MMHG | SYSTOLIC BLOOD PRESSURE: 136 MMHG | TEMPERATURE: 99 F

## 2021-02-09 DIAGNOSIS — S61.209A FLEXOR TENDON LACERATION OF FINGER WITH OPEN WOUND, INITIAL ENCOUNTER: ICD-10-CM

## 2021-02-09 DIAGNOSIS — S61.211A LACERATION OF LEFT INDEX FINGER WITHOUT FOREIGN BODY WITHOUT DAMAGE TO NAIL, INITIAL ENCOUNTER: Primary | ICD-10-CM

## 2021-02-09 DIAGNOSIS — S56.129A FLEXOR TENDON LACERATION OF FINGER WITH OPEN WOUND, INITIAL ENCOUNTER: ICD-10-CM

## 2021-02-09 PROCEDURE — 12001 RPR S/N/AX/GEN/TRNK 2.5CM/<: CPT

## 2021-02-09 PROCEDURE — 25000003 PHARM REV CODE 250: Performed by: EMERGENCY MEDICINE

## 2021-02-09 PROCEDURE — 96372 THER/PROPH/DIAG INJ SC/IM: CPT

## 2021-02-09 PROCEDURE — 99284 EMERGENCY DEPT VISIT MOD MDM: CPT | Mod: 25

## 2021-02-09 PROCEDURE — 63600175 PHARM REV CODE 636 W HCPCS: Performed by: EMERGENCY MEDICINE

## 2021-02-09 RX ORDER — HYDROCODONE BITARTRATE AND ACETAMINOPHEN 5; 325 MG/1; MG/1
1 TABLET ORAL EVERY 4 HOURS PRN
Qty: 14 TABLET | Refills: 0 | Status: SHIPPED | OUTPATIENT
Start: 2021-02-09 | End: 2021-04-06

## 2021-02-09 RX ORDER — CEPHALEXIN 500 MG/1
500 CAPSULE ORAL EVERY 12 HOURS
Qty: 20 CAPSULE | Refills: 0 | Status: SHIPPED | OUTPATIENT
Start: 2021-02-09 | End: 2021-02-14

## 2021-02-09 RX ORDER — ACETAMINOPHEN 325 MG/1
650 TABLET ORAL
Status: COMPLETED | OUTPATIENT
Start: 2021-02-09 | End: 2021-02-09

## 2021-02-09 RX ORDER — CEFAZOLIN SODIUM 1 G/3ML
1 INJECTION, POWDER, FOR SOLUTION INTRAMUSCULAR; INTRAVENOUS
Status: COMPLETED | OUTPATIENT
Start: 2021-02-09 | End: 2021-02-09

## 2021-02-09 RX ORDER — LIDOCAINE HYDROCHLORIDE 10 MG/ML
10 INJECTION INFILTRATION; PERINEURAL
Status: COMPLETED | OUTPATIENT
Start: 2021-02-09 | End: 2021-02-09

## 2021-02-09 RX ADMIN — CEFAZOLIN 1 G: 330 INJECTION, POWDER, FOR SOLUTION INTRAMUSCULAR; INTRAVENOUS at 04:02

## 2021-02-09 RX ADMIN — ACETAMINOPHEN 650 MG: 325 TABLET, FILM COATED ORAL at 03:02

## 2021-02-09 RX ADMIN — LIDOCAINE HYDROCHLORIDE 10 ML: 10 INJECTION, SOLUTION EPIDURAL; INFILTRATION; INTRACAUDAL; PERINEURAL at 03:02

## 2021-02-10 RX ORDER — SOTALOL HYDROCHLORIDE 80 MG/1
80 TABLET ORAL 2 TIMES DAILY
Qty: 180 TABLET | Refills: 3 | Status: SHIPPED | OUTPATIENT
Start: 2021-02-10 | End: 2021-04-23

## 2021-02-10 RX ORDER — DILTIAZEM HYDROCHLORIDE EXTENDED-RELEASE TABLETS 120 MG/1
120 TABLET, EXTENDED RELEASE ORAL DAILY
Qty: 90 TABLET | Refills: 3 | Status: SHIPPED | OUTPATIENT
Start: 2021-02-10 | End: 2021-04-23

## 2021-02-11 ENCOUNTER — TELEPHONE (OUTPATIENT)
Dept: CARDIOLOGY | Facility: CLINIC | Age: 36
End: 2021-02-11

## 2021-03-03 NOTE — PLAN OF CARE
08/08/20 1442   Final Note   Assessment Type Final Discharge Note   Anticipated Discharge Disposition Home   SW reviewed d/c orders - no CM needs.   Imiquimod Counseling:  I discussed with the patient the risks of imiquimod including but not limited to erythema, scaling, itching, weeping, crusting, and pain.  Patient understands that the inflammatory response to imiquimod is variable from person to person and was educated regarded proper titration schedule.  If flu-like symptoms develop, patient knows to discontinue the medication and contact us.

## 2021-03-24 ENCOUNTER — OFFICE VISIT (OUTPATIENT)
Dept: PULMONOLOGY | Facility: CLINIC | Age: 36
End: 2021-03-24
Payer: MEDICAID

## 2021-03-24 VITALS
BODY MASS INDEX: 33.32 KG/M2 | HEIGHT: 72 IN | DIASTOLIC BLOOD PRESSURE: 96 MMHG | OXYGEN SATURATION: 98 % | WEIGHT: 246 LBS | HEART RATE: 77 BPM | SYSTOLIC BLOOD PRESSURE: 150 MMHG

## 2021-03-24 DIAGNOSIS — R40.0 DAYTIME SOMNOLENCE: ICD-10-CM

## 2021-03-24 DIAGNOSIS — G47.33 OSA (OBSTRUCTIVE SLEEP APNEA): Primary | ICD-10-CM

## 2021-03-24 DIAGNOSIS — F51.01 PRIMARY INSOMNIA: ICD-10-CM

## 2021-03-24 PROCEDURE — 99204 OFFICE O/P NEW MOD 45 MIN: CPT | Mod: S$GLB,,, | Performed by: INTERNAL MEDICINE

## 2021-03-24 PROCEDURE — 99204 PR OFFICE/OUTPT VISIT, NEW, LEVL IV, 45-59 MIN: ICD-10-PCS | Mod: S$GLB,,, | Performed by: INTERNAL MEDICINE

## 2021-03-29 ENCOUNTER — LAB VISIT (OUTPATIENT)
Dept: PRIMARY CARE CLINIC | Facility: CLINIC | Age: 36
End: 2021-03-29
Payer: MEDICAID

## 2021-03-29 DIAGNOSIS — Z01.818 PREOP TESTING: ICD-10-CM

## 2021-03-29 PROCEDURE — U0003 INFECTIOUS AGENT DETECTION BY NUCLEIC ACID (DNA OR RNA); SEVERE ACUTE RESPIRATORY SYNDROME CORONAVIRUS 2 (SARS-COV-2) (CORONAVIRUS DISEASE [COVID-19]), AMPLIFIED PROBE TECHNIQUE, MAKING USE OF HIGH THROUGHPUT TECHNOLOGIES AS DESCRIBED BY CMS-2020-01-R: HCPCS | Performed by: INTERNAL MEDICINE

## 2021-03-29 PROCEDURE — U0005 INFEC AGEN DETEC AMPLI PROBE: HCPCS | Performed by: INTERNAL MEDICINE

## 2021-03-30 LAB — SARS-COV-2 RNA RESP QL NAA+PROBE: NOT DETECTED

## 2021-04-01 ENCOUNTER — TELEPHONE (OUTPATIENT)
Dept: PULMONOLOGY | Facility: CLINIC | Age: 36
End: 2021-04-01

## 2021-04-01 DIAGNOSIS — G47.33 OSA (OBSTRUCTIVE SLEEP APNEA): Primary | ICD-10-CM

## 2021-04-01 DIAGNOSIS — Z01.818 OTHER SPECIFIED PRE-OPERATIVE EXAMINATION: ICD-10-CM

## 2021-04-06 ENCOUNTER — OFFICE VISIT (OUTPATIENT)
Dept: CARDIOLOGY | Facility: CLINIC | Age: 36
End: 2021-04-06
Payer: MEDICAID

## 2021-04-06 VITALS
OXYGEN SATURATION: 98 % | WEIGHT: 245 LBS | HEART RATE: 66 BPM | BODY MASS INDEX: 33.18 KG/M2 | HEIGHT: 72 IN | DIASTOLIC BLOOD PRESSURE: 80 MMHG | SYSTOLIC BLOOD PRESSURE: 120 MMHG | RESPIRATION RATE: 16 BRPM

## 2021-04-06 DIAGNOSIS — I48.0 PAROXYSMAL ATRIAL FIBRILLATION: ICD-10-CM

## 2021-04-06 DIAGNOSIS — E78.2 MIXED HYPERLIPIDEMIA: ICD-10-CM

## 2021-04-06 PROCEDURE — 99213 PR OFFICE/OUTPT VISIT, EST, LEVL III, 20-29 MIN: ICD-10-PCS | Mod: S$GLB,,, | Performed by: INTERNAL MEDICINE

## 2021-04-06 PROCEDURE — 93000 EKG 12-LEAD: ICD-10-PCS | Mod: S$GLB,,, | Performed by: INTERNAL MEDICINE

## 2021-04-06 PROCEDURE — 93000 ELECTROCARDIOGRAM COMPLETE: CPT | Mod: S$GLB,,, | Performed by: INTERNAL MEDICINE

## 2021-04-06 PROCEDURE — 99213 OFFICE O/P EST LOW 20 MIN: CPT | Mod: S$GLB,,, | Performed by: INTERNAL MEDICINE

## 2021-04-09 ENCOUNTER — HOSPITAL ENCOUNTER (OUTPATIENT)
Dept: PREADMISSION TESTING | Facility: HOSPITAL | Age: 36
Discharge: HOME | End: 2021-04-09
Attending: INTERNAL MEDICINE
Payer: MEDICAID

## 2021-04-09 ENCOUNTER — OFFICE VISIT (OUTPATIENT)
Dept: FAMILY MEDICINE | Facility: CLINIC | Age: 36
End: 2021-04-09
Payer: MEDICAID

## 2021-04-09 VITALS
DIASTOLIC BLOOD PRESSURE: 80 MMHG | TEMPERATURE: 98 F | HEART RATE: 67 BPM | OXYGEN SATURATION: 100 % | BODY MASS INDEX: 33.32 KG/M2 | WEIGHT: 246 LBS | SYSTOLIC BLOOD PRESSURE: 120 MMHG | HEIGHT: 72 IN

## 2021-04-09 DIAGNOSIS — F41.1 GAD (GENERALIZED ANXIETY DISORDER): Primary | ICD-10-CM

## 2021-04-09 DIAGNOSIS — G47.33 OSA (OBSTRUCTIVE SLEEP APNEA): ICD-10-CM

## 2021-04-09 DIAGNOSIS — R40.0 DAYTIME SOMNOLENCE: ICD-10-CM

## 2021-04-09 DIAGNOSIS — E66.09 CLASS 1 OBESITY DUE TO EXCESS CALORIES WITH SERIOUS COMORBIDITY AND BODY MASS INDEX (BMI) OF 33.0 TO 33.9 IN ADULT: ICD-10-CM

## 2021-04-09 DIAGNOSIS — E78.2 MIXED HYPERLIPIDEMIA: ICD-10-CM

## 2021-04-09 DIAGNOSIS — I48.0 PAROXYSMAL ATRIAL FIBRILLATION: ICD-10-CM

## 2021-04-09 LAB — SARS-COV-2 RNA RESP QL NAA+PROBE: NOT DETECTED

## 2021-04-09 PROCEDURE — 99214 PR OFFICE/OUTPT VISIT, EST, LEVL IV, 30-39 MIN: ICD-10-PCS | Mod: S$PBB,,, | Performed by: NURSE PRACTITIONER

## 2021-04-09 PROCEDURE — U0005 INFEC AGEN DETEC AMPLI PROBE: HCPCS | Performed by: INTERNAL MEDICINE

## 2021-04-09 PROCEDURE — 99214 OFFICE O/P EST MOD 30 MIN: CPT | Performed by: NURSE PRACTITIONER

## 2021-04-09 PROCEDURE — U0003 INFECTIOUS AGENT DETECTION BY NUCLEIC ACID (DNA OR RNA); SEVERE ACUTE RESPIRATORY SYNDROME CORONAVIRUS 2 (SARS-COV-2) (CORONAVIRUS DISEASE [COVID-19]), AMPLIFIED PROBE TECHNIQUE, MAKING USE OF HIGH THROUGHPUT TECHNOLOGIES AS DESCRIBED BY CMS-2020-01-R: HCPCS | Performed by: INTERNAL MEDICINE

## 2021-04-09 PROCEDURE — 99214 OFFICE O/P EST MOD 30 MIN: CPT | Mod: S$PBB,,, | Performed by: NURSE PRACTITIONER

## 2021-04-14 ENCOUNTER — TELEPHONE (OUTPATIENT)
Dept: PULMONOLOGY | Facility: CLINIC | Age: 36
End: 2021-04-14

## 2021-04-14 DIAGNOSIS — G47.33 OSA (OBSTRUCTIVE SLEEP APNEA): Primary | ICD-10-CM

## 2021-04-23 ENCOUNTER — OFFICE VISIT (OUTPATIENT)
Dept: CARDIOLOGY | Facility: CLINIC | Age: 36
End: 2021-04-23
Payer: MEDICAID

## 2021-04-23 VITALS
HEART RATE: 68 BPM | DIASTOLIC BLOOD PRESSURE: 88 MMHG | SYSTOLIC BLOOD PRESSURE: 130 MMHG | WEIGHT: 248 LBS | HEIGHT: 72 IN | BODY MASS INDEX: 33.59 KG/M2 | OXYGEN SATURATION: 98 %

## 2021-04-23 DIAGNOSIS — I48.0 PAROXYSMAL ATRIAL FIBRILLATION: Primary | ICD-10-CM

## 2021-04-23 DIAGNOSIS — Z79.01 CURRENT USE OF LONG TERM ANTICOAGULATION: ICD-10-CM

## 2021-04-23 DIAGNOSIS — Z51.81 ENCOUNTER FOR MONITORING SOTALOL THERAPY: ICD-10-CM

## 2021-04-23 DIAGNOSIS — Z79.899 ENCOUNTER FOR MONITORING SOTALOL THERAPY: ICD-10-CM

## 2021-04-23 PROCEDURE — 93005 ELECTROCARDIOGRAM TRACING: CPT | Mod: S$GLB,,, | Performed by: INTERNAL MEDICINE

## 2021-04-23 PROCEDURE — 99214 OFFICE O/P EST MOD 30 MIN: CPT | Mod: S$GLB,,, | Performed by: INTERNAL MEDICINE

## 2021-04-23 PROCEDURE — 93005 EKG 12-LEAD: ICD-10-PCS | Mod: S$GLB,,, | Performed by: INTERNAL MEDICINE

## 2021-04-23 PROCEDURE — 99214 PR OFFICE/OUTPT VISIT, EST, LEVL IV, 30-39 MIN: ICD-10-PCS | Mod: S$GLB,,, | Performed by: INTERNAL MEDICINE

## 2021-04-23 PROCEDURE — 93010 ELECTROCARDIOGRAM REPORT: CPT | Mod: S$GLB,,, | Performed by: SPECIALIST

## 2021-04-23 PROCEDURE — 93010 EKG 12-LEAD: ICD-10-PCS | Mod: S$GLB,,, | Performed by: SPECIALIST

## 2021-04-23 RX ORDER — LANOLIN ALCOHOL/MO/W.PET/CERES
400 CREAM (GRAM) TOPICAL DAILY
COMMUNITY
End: 2023-03-02

## 2021-04-23 RX ORDER — ASPIRIN 81 MG/1
81 TABLET ORAL DAILY
Qty: 180 TABLET | Refills: 3
Start: 2021-04-23 | End: 2023-02-17

## 2021-04-29 ENCOUNTER — PROCEDURE VISIT (OUTPATIENT)
Dept: SLEEP MEDICINE | Facility: HOSPITAL | Age: 36
End: 2021-04-29
Attending: INTERNAL MEDICINE
Payer: MEDICAID

## 2021-04-29 DIAGNOSIS — G47.33 OSA (OBSTRUCTIVE SLEEP APNEA): ICD-10-CM

## 2021-04-29 PROCEDURE — 95806 SLEEP STUDY UNATT&RESP EFFT: CPT

## 2021-05-04 ENCOUNTER — TELEPHONE (OUTPATIENT)
Dept: PULMONOLOGY | Facility: HOSPITAL | Age: 36
End: 2021-05-04

## 2021-05-04 DIAGNOSIS — G47.33 OSA (OBSTRUCTIVE SLEEP APNEA): Primary | ICD-10-CM

## 2021-05-06 ENCOUNTER — PATIENT MESSAGE (OUTPATIENT)
Dept: RESEARCH | Facility: HOSPITAL | Age: 36
End: 2021-05-06

## 2021-08-11 ENCOUNTER — HOSPITAL ENCOUNTER (EMERGENCY)
Facility: HOSPITAL | Age: 36
Discharge: HOME | End: 2021-08-11
Attending: EMERGENCY MEDICINE
Payer: MEDICAID

## 2021-08-11 VITALS
HEIGHT: 72 IN | SYSTOLIC BLOOD PRESSURE: 135 MMHG | WEIGHT: 240 LBS | DIASTOLIC BLOOD PRESSURE: 70 MMHG | BODY MASS INDEX: 32.51 KG/M2 | TEMPERATURE: 98 F | HEART RATE: 105 BPM | OXYGEN SATURATION: 96 % | RESPIRATION RATE: 17 BRPM

## 2021-08-11 DIAGNOSIS — R00.2 PALPITATIONS: ICD-10-CM

## 2021-08-11 DIAGNOSIS — U07.1 COVID-19 VIRUS INFECTION: Primary | ICD-10-CM

## 2021-08-11 DIAGNOSIS — R11.10 VOMITING, INTRACTABILITY OF VOMITING NOT SPECIFIED, PRESENCE OF NAUSEA NOT SPECIFIED, UNSPECIFIED VOMITING TYPE: ICD-10-CM

## 2021-08-11 LAB — SARS-COV-2 RDRP RESP QL NAA+PROBE: POSITIVE

## 2021-08-11 PROCEDURE — 99283 EMERGENCY DEPT VISIT LOW MDM: CPT | Mod: 25

## 2021-08-11 PROCEDURE — U0002 COVID-19 LAB TEST NON-CDC: HCPCS | Performed by: EMERGENCY MEDICINE

## 2021-08-11 PROCEDURE — 25000003 PHARM REV CODE 250: Performed by: EMERGENCY MEDICINE

## 2021-08-11 RX ORDER — ONDANSETRON 4 MG/1
4 TABLET, FILM COATED ORAL EVERY 6 HOURS
Qty: 12 TABLET | Refills: 0 | Status: SHIPPED | OUTPATIENT
Start: 2021-08-11 | End: 2021-08-11 | Stop reason: SDUPTHER

## 2021-08-11 RX ORDER — DILTIAZEM HYDROCHLORIDE 120 MG/1
120 CAPSULE, COATED, EXTENDED RELEASE ORAL DAILY
COMMUNITY
Start: 2021-05-17 | End: 2021-09-02

## 2021-08-11 RX ORDER — NAPROXEN SODIUM 220 MG
220 TABLET ORAL
COMMUNITY
End: 2021-09-02

## 2021-08-11 RX ORDER — ONDANSETRON 4 MG/1
4 TABLET, FILM COATED ORAL EVERY 6 HOURS
Qty: 12 TABLET | Refills: 0 | Status: SHIPPED | OUTPATIENT
Start: 2021-08-11 | End: 2021-09-02

## 2021-08-11 RX ORDER — ONDANSETRON 4 MG/1
4 TABLET, ORALLY DISINTEGRATING ORAL
Status: COMPLETED | OUTPATIENT
Start: 2021-08-11 | End: 2021-08-11

## 2021-08-11 RX ADMIN — ONDANSETRON 4 MG: 4 TABLET, ORALLY DISINTEGRATING ORAL at 07:08

## 2021-08-18 ENCOUNTER — HOSPITAL ENCOUNTER (INPATIENT)
Facility: HOSPITAL | Age: 36
LOS: 1 days | Discharge: HOME | DRG: 177 | End: 2021-08-19
Attending: EMERGENCY MEDICINE | Admitting: INTERNAL MEDICINE
Payer: MEDICAID

## 2021-08-18 DIAGNOSIS — R07.9 CHEST PAIN: ICD-10-CM

## 2021-08-18 DIAGNOSIS — U07.1 PNEUMONIA DUE TO COVID-19 VIRUS: Primary | ICD-10-CM

## 2021-08-18 DIAGNOSIS — I26.99 PULMONARY EMBOLISM: ICD-10-CM

## 2021-08-18 DIAGNOSIS — J12.82 PNEUMONIA DUE TO COVID-19 VIRUS: Primary | ICD-10-CM

## 2021-08-18 DIAGNOSIS — I26.99 ACUTE PULMONARY EMBOLISM WITHOUT ACUTE COR PULMONALE, UNSPECIFIED PULMONARY EMBOLISM TYPE: ICD-10-CM

## 2021-08-18 LAB
ALBUMIN SERPL BCP-MCNC: 3.5 G/DL (ref 3.5–5.2)
ALP SERPL-CCNC: 63 U/L (ref 55–135)
ALT SERPL W/O P-5'-P-CCNC: 33 U/L (ref 10–44)
ANION GAP SERPL CALC-SCNC: 10 MMOL/L (ref 8–16)
AST SERPL-CCNC: 23 U/L (ref 10–40)
BASOPHILS # BLD AUTO: 0.04 K/UL (ref 0–0.2)
BASOPHILS NFR BLD: 0.4 % (ref 0–1.9)
BILIRUB SERPL-MCNC: 1.2 MG/DL (ref 0.1–1)
BUN SERPL-MCNC: 11 MG/DL (ref 6–20)
CALCIUM SERPL-MCNC: 8.3 MG/DL (ref 8.7–10.5)
CHLORIDE SERPL-SCNC: 107 MMOL/L (ref 95–110)
CK SERPL-CCNC: 130 U/L (ref 20–200)
CK SERPL-CCNC: 75 U/L (ref 20–200)
CO2 SERPL-SCNC: 25 MMOL/L (ref 23–29)
CREAT SERPL-MCNC: 1 MG/DL (ref 0.5–1.4)
CRP SERPL-MCNC: 4.13 MG/DL
DIFFERENTIAL METHOD: ABNORMAL
EOSINOPHIL # BLD AUTO: 0.2 K/UL (ref 0–0.5)
EOSINOPHIL NFR BLD: 2.2 % (ref 0–8)
ERYTHROCYTE [DISTWIDTH] IN BLOOD BY AUTOMATED COUNT: 11.8 % (ref 11.5–14.5)
ERYTHROCYTE [SEDIMENTATION RATE] IN BLOOD BY WESTERGREN METHOD: 8 MM/HR (ref 0–10)
EST. GFR  (AFRICAN AMERICAN): >60 ML/MIN/1.73 M^2
EST. GFR  (NON AFRICAN AMERICAN): >60 ML/MIN/1.73 M^2
FERRITIN SERPL-MCNC: 363 NG/ML (ref 20–300)
GLUCOSE SERPL-MCNC: 106 MG/DL (ref 70–110)
HCT VFR BLD AUTO: 44.2 % (ref 40–54)
HGB BLD-MCNC: 15.3 G/DL (ref 14–18)
IMM GRANULOCYTES # BLD AUTO: 0.13 K/UL (ref 0–0.04)
IMM GRANULOCYTES NFR BLD AUTO: 1.2 % (ref 0–0.5)
LDH SERPL L TO P-CCNC: 249 U/L (ref 110–260)
LYMPHOCYTES # BLD AUTO: 1.5 K/UL (ref 1–4.8)
LYMPHOCYTES NFR BLD: 13.5 % (ref 18–48)
MCH RBC QN AUTO: 31 PG (ref 27–31)
MCHC RBC AUTO-ENTMCNC: 34.6 G/DL (ref 32–36)
MCV RBC AUTO: 90 FL (ref 82–98)
MONOCYTES # BLD AUTO: 1 K/UL (ref 0.3–1)
MONOCYTES NFR BLD: 9.6 % (ref 4–15)
NEUTROPHILS # BLD AUTO: 7.9 K/UL (ref 1.8–7.7)
NEUTROPHILS NFR BLD: 73.1 % (ref 38–73)
NRBC BLD-RTO: 0 /100 WBC
PLATELET # BLD AUTO: 388 K/UL (ref 150–450)
PMV BLD AUTO: 9.1 FL (ref 9.2–12.9)
POTASSIUM SERPL-SCNC: 3.9 MMOL/L (ref 3.5–5.1)
PROT SERPL-MCNC: 7.4 G/DL (ref 6–8.4)
RBC # BLD AUTO: 4.93 M/UL (ref 4.6–6.2)
SODIUM SERPL-SCNC: 142 MMOL/L (ref 136–145)
URATE SERPL-MCNC: 4.4 MG/DL (ref 3.4–7)
WBC # BLD AUTO: 10.78 K/UL (ref 3.9–12.7)

## 2021-08-18 PROCEDURE — 85651 RBC SED RATE NONAUTOMATED: CPT | Performed by: EMERGENCY MEDICINE

## 2021-08-18 PROCEDURE — 82550 ASSAY OF CK (CPK): CPT | Performed by: PHYSICIAN ASSISTANT

## 2021-08-18 PROCEDURE — 93010 ELECTROCARDIOGRAM REPORT: CPT | Mod: ,,, | Performed by: GENERAL PRACTICE

## 2021-08-18 PROCEDURE — 25000242 PHARM REV CODE 250 ALT 637 W/ HCPCS: Performed by: INTERNAL MEDICINE

## 2021-08-18 PROCEDURE — 94761 N-INVAS EAR/PLS OXIMETRY MLT: CPT

## 2021-08-18 PROCEDURE — 80053 COMPREHEN METABOLIC PANEL: CPT | Performed by: PHYSICIAN ASSISTANT

## 2021-08-18 PROCEDURE — 63600175 PHARM REV CODE 636 W HCPCS: Performed by: INTERNAL MEDICINE

## 2021-08-18 PROCEDURE — 96372 THER/PROPH/DIAG INJ SC/IM: CPT | Mod: 59

## 2021-08-18 PROCEDURE — 93005 ELECTROCARDIOGRAM TRACING: CPT | Performed by: GENERAL PRACTICE

## 2021-08-18 PROCEDURE — 99900031 HC PATIENT EDUCATION (STAT)

## 2021-08-18 PROCEDURE — 94640 AIRWAY INHALATION TREATMENT: CPT

## 2021-08-18 PROCEDURE — 85025 COMPLETE CBC W/AUTO DIFF WBC: CPT | Performed by: PHYSICIAN ASSISTANT

## 2021-08-18 PROCEDURE — 86140 C-REACTIVE PROTEIN: CPT | Performed by: EMERGENCY MEDICINE

## 2021-08-18 PROCEDURE — 99285 EMERGENCY DEPT VISIT HI MDM: CPT | Mod: 25

## 2021-08-18 PROCEDURE — 63600175 PHARM REV CODE 636 W HCPCS: Performed by: EMERGENCY MEDICINE

## 2021-08-18 PROCEDURE — 93010 EKG 12-LEAD: ICD-10-PCS | Mod: ,,, | Performed by: GENERAL PRACTICE

## 2021-08-18 PROCEDURE — 83615 LACTATE (LD) (LDH) ENZYME: CPT | Performed by: EMERGENCY MEDICINE

## 2021-08-18 PROCEDURE — 99900035 HC TECH TIME PER 15 MIN (STAT)

## 2021-08-18 PROCEDURE — 12000002 HC ACUTE/MED SURGE SEMI-PRIVATE ROOM

## 2021-08-18 PROCEDURE — 96374 THER/PROPH/DIAG INJ IV PUSH: CPT

## 2021-08-18 PROCEDURE — 82728 ASSAY OF FERRITIN: CPT | Performed by: EMERGENCY MEDICINE

## 2021-08-18 PROCEDURE — 82550 ASSAY OF CK (CPK): CPT | Mod: 91 | Performed by: EMERGENCY MEDICINE

## 2021-08-18 PROCEDURE — 84550 ASSAY OF BLOOD/URIC ACID: CPT | Performed by: EMERGENCY MEDICINE

## 2021-08-18 RX ORDER — SODIUM CHLORIDE 0.9 % (FLUSH) 0.9 %
10 SYRINGE (ML) INJECTION EVERY 6 HOURS PRN
Status: DISCONTINUED | OUTPATIENT
Start: 2021-08-18 | End: 2021-08-19 | Stop reason: HOSPADM

## 2021-08-18 RX ORDER — KETOROLAC TROMETHAMINE 30 MG/ML
30 INJECTION, SOLUTION INTRAMUSCULAR; INTRAVENOUS
Status: COMPLETED | OUTPATIENT
Start: 2021-08-18 | End: 2021-08-18

## 2021-08-18 RX ORDER — DEXAMETHASONE SODIUM PHOSPHATE 4 MG/ML
6 INJECTION, SOLUTION INTRA-ARTICULAR; INTRALESIONAL; INTRAMUSCULAR; INTRAVENOUS; SOFT TISSUE DAILY
Status: DISCONTINUED | OUTPATIENT
Start: 2021-08-19 | End: 2021-08-19

## 2021-08-18 RX ORDER — IBUPROFEN 200 MG
16 TABLET ORAL
Status: DISCONTINUED | OUTPATIENT
Start: 2021-08-18 | End: 2021-08-19 | Stop reason: HOSPADM

## 2021-08-18 RX ORDER — PROCHLORPERAZINE EDISYLATE 5 MG/ML
5 INJECTION INTRAMUSCULAR; INTRAVENOUS EVERY 6 HOURS PRN
Status: DISCONTINUED | OUTPATIENT
Start: 2021-08-18 | End: 2021-08-19 | Stop reason: HOSPADM

## 2021-08-18 RX ORDER — MORPHINE SULFATE 4 MG/ML
4 INJECTION, SOLUTION INTRAMUSCULAR; INTRAVENOUS EVERY 4 HOURS PRN
Status: DISCONTINUED | OUTPATIENT
Start: 2021-08-18 | End: 2021-08-19 | Stop reason: HOSPADM

## 2021-08-18 RX ORDER — ALBUTEROL SULFATE 90 UG/1
2 AEROSOL, METERED RESPIRATORY (INHALATION) EVERY 4 HOURS
Status: DISCONTINUED | OUTPATIENT
Start: 2021-08-18 | End: 2021-08-19

## 2021-08-18 RX ORDER — BENZONATATE 100 MG/1
100 CAPSULE ORAL 3 TIMES DAILY PRN
Status: DISCONTINUED | OUTPATIENT
Start: 2021-08-18 | End: 2021-08-19 | Stop reason: HOSPADM

## 2021-08-18 RX ORDER — IBUPROFEN 200 MG
24 TABLET ORAL
Status: DISCONTINUED | OUTPATIENT
Start: 2021-08-18 | End: 2021-08-19 | Stop reason: HOSPADM

## 2021-08-18 RX ORDER — ACETAMINOPHEN 500 MG
1000 TABLET ORAL EVERY 8 HOURS PRN
Status: DISCONTINUED | OUTPATIENT
Start: 2021-08-18 | End: 2021-08-19 | Stop reason: HOSPADM

## 2021-08-18 RX ORDER — GLUCAGON 1 MG
1 KIT INJECTION
Status: DISCONTINUED | OUTPATIENT
Start: 2021-08-18 | End: 2021-08-19 | Stop reason: HOSPADM

## 2021-08-18 RX ORDER — HYDROCODONE BITARTRATE AND ACETAMINOPHEN 5; 325 MG/1; MG/1
1 TABLET ORAL EVERY 6 HOURS PRN
Status: DISCONTINUED | OUTPATIENT
Start: 2021-08-18 | End: 2021-08-19

## 2021-08-18 RX ORDER — ACETAMINOPHEN 325 MG/1
650 TABLET ORAL EVERY 4 HOURS PRN
Status: DISCONTINUED | OUTPATIENT
Start: 2021-08-18 | End: 2021-08-19 | Stop reason: HOSPADM

## 2021-08-18 RX ORDER — POLYETHYLENE GLYCOL 3350 17 G/17G
17 POWDER, FOR SOLUTION ORAL DAILY
Status: DISCONTINUED | OUTPATIENT
Start: 2021-08-19 | End: 2021-08-19 | Stop reason: HOSPADM

## 2021-08-18 RX ORDER — ENOXAPARIN SODIUM 100 MG/ML
100 INJECTION SUBCUTANEOUS ONCE
Status: COMPLETED | OUTPATIENT
Start: 2021-08-18 | End: 2021-08-18

## 2021-08-18 RX ORDER — TALC
9 POWDER (GRAM) TOPICAL NIGHTLY PRN
Status: DISCONTINUED | OUTPATIENT
Start: 2021-08-18 | End: 2021-08-19 | Stop reason: HOSPADM

## 2021-08-18 RX ORDER — ONDANSETRON 2 MG/ML
4 INJECTION INTRAMUSCULAR; INTRAVENOUS EVERY 8 HOURS PRN
Status: DISCONTINUED | OUTPATIENT
Start: 2021-08-18 | End: 2021-08-19 | Stop reason: HOSPADM

## 2021-08-18 RX ORDER — SODIUM CHLORIDE, SODIUM LACTATE, POTASSIUM CHLORIDE, CALCIUM CHLORIDE 600; 310; 30; 20 MG/100ML; MG/100ML; MG/100ML; MG/100ML
500 INJECTION, SOLUTION INTRAVENOUS
Status: COMPLETED | OUTPATIENT
Start: 2021-08-18 | End: 2021-08-18

## 2021-08-18 RX ORDER — ENOXAPARIN SODIUM 100 MG/ML
1 INJECTION SUBCUTANEOUS
Status: DISCONTINUED | OUTPATIENT
Start: 2021-08-19 | End: 2021-08-19 | Stop reason: HOSPADM

## 2021-08-18 RX ADMIN — ENOXAPARIN SODIUM 100 MG: 100 INJECTION SUBCUTANEOUS at 06:08

## 2021-08-18 RX ADMIN — SODIUM CHLORIDE, SODIUM LACTATE, POTASSIUM CHLORIDE, AND CALCIUM CHLORIDE 500 ML: .6; .31; .03; .02 INJECTION, SOLUTION INTRAVENOUS at 05:08

## 2021-08-18 RX ADMIN — MORPHINE SULFATE 4 MG: 4 INJECTION, SOLUTION INTRAMUSCULAR; INTRAVENOUS at 09:08

## 2021-08-18 RX ADMIN — ALBUTEROL SULFATE 2 PUFF: 90 AEROSOL, METERED RESPIRATORY (INHALATION) at 09:08

## 2021-08-18 RX ADMIN — KETOROLAC TROMETHAMINE 30 MG: 30 INJECTION, SOLUTION INTRAMUSCULAR; INTRAVENOUS at 05:08

## 2021-08-19 ENCOUNTER — CLINICAL SUPPORT (OUTPATIENT)
Dept: CARDIOLOGY | Facility: HOSPITAL | Age: 36
DRG: 177 | End: 2021-08-19
Attending: INTERNAL MEDICINE
Payer: MEDICAID

## 2021-08-19 VITALS
RESPIRATION RATE: 22 BRPM | BODY MASS INDEX: 33.12 KG/M2 | TEMPERATURE: 98 F | OXYGEN SATURATION: 94 % | DIASTOLIC BLOOD PRESSURE: 85 MMHG | HEIGHT: 72 IN | WEIGHT: 244.5 LBS | SYSTOLIC BLOOD PRESSURE: 132 MMHG | HEART RATE: 80 BPM

## 2021-08-19 VITALS — BODY MASS INDEX: 33.12 KG/M2 | HEIGHT: 72 IN | WEIGHT: 244.5 LBS

## 2021-08-19 LAB
ALBUMIN SERPL BCP-MCNC: 3.1 G/DL (ref 3.5–5.2)
ALP SERPL-CCNC: 54 U/L (ref 55–135)
ALT SERPL W/O P-5'-P-CCNC: 27 U/L (ref 10–44)
ANION GAP SERPL CALC-SCNC: 11 MMOL/L (ref 8–16)
AORTIC ROOT ANNULUS: 2.87 CM
AORTIC VALVE CUSP SEPERATION: 2.3 CM
AST SERPL-CCNC: 18 U/L (ref 10–40)
AV INDEX (PROSTH): 0.95
AV MEAN GRADIENT: 6 MMHG
AV PEAK GRADIENT: 9 MMHG
AV VALVE AREA: 3.65 CM2
AV VELOCITY RATIO: 97.78
BASOPHILS # BLD AUTO: 0.05 K/UL (ref 0–0.2)
BASOPHILS NFR BLD: 0.5 % (ref 0–1.9)
BILIRUB SERPL-MCNC: 1.5 MG/DL (ref 0.1–1)
BNP SERPL-MCNC: 47 PG/ML (ref 0–99)
BSA FOR ECHO PROCEDURE: 2.37 M2
BUN SERPL-MCNC: 11 MG/DL (ref 6–20)
CALCIUM SERPL-MCNC: 8.2 MG/DL (ref 8.7–10.5)
CHLORIDE SERPL-SCNC: 105 MMOL/L (ref 95–110)
CHOLEST SERPL-MCNC: 130 MG/DL (ref 120–199)
CHOLEST/HDLC SERPL: 5.4 {RATIO} (ref 2–5)
CO2 SERPL-SCNC: 25 MMOL/L (ref 23–29)
CREAT SERPL-MCNC: 0.9 MG/DL (ref 0.5–1.4)
CV ECHO LV RWT: 0.45 CM
DIFFERENTIAL METHOD: ABNORMAL
DOP CALC AO PEAK VEL: 1.49 M/S
DOP CALC AO VTI: 28.35 CM
DOP CALC LVOT AREA: 3.8 CM2
DOP CALC LVOT DIAMETER: 2.21 CM
DOP CALC LVOT PEAK VEL: 145.69 M/S
DOP CALC LVOT STROKE VOLUME: 103.56 CM3
DOP CALCLVOT PEAK VEL VTI: 27.01 CM
E WAVE DECELERATION TIME: 149.18 MSEC
E/A RATIO: 1.86
E/E' RATIO: 8 M/S
ECHO LV POSTERIOR WALL: 1.06 CM (ref 0.6–1.1)
EJECTION FRACTION: 65 %
EOSINOPHIL # BLD AUTO: 0.2 K/UL (ref 0–0.5)
EOSINOPHIL NFR BLD: 1.6 % (ref 0–8)
ERYTHROCYTE [DISTWIDTH] IN BLOOD BY AUTOMATED COUNT: 11.8 % (ref 11.5–14.5)
EST. GFR  (AFRICAN AMERICAN): >60 ML/MIN/1.73 M^2
EST. GFR  (NON AFRICAN AMERICAN): >60 ML/MIN/1.73 M^2
ESTIMATED AVG GLUCOSE: 120 MG/DL (ref 68–131)
FRACTIONAL SHORTENING: 30 % (ref 28–44)
GLUCOSE SERPL-MCNC: 105 MG/DL (ref 70–110)
HBA1C MFR BLD: 5.8 % (ref 4.5–6.2)
HCT VFR BLD AUTO: 43.9 % (ref 40–54)
HDLC SERPL-MCNC: 24 MG/DL (ref 40–75)
HDLC SERPL: 18.5 % (ref 20–50)
HGB BLD-MCNC: 15.1 G/DL (ref 14–18)
IMM GRANULOCYTES # BLD AUTO: 0.16 K/UL (ref 0–0.04)
IMM GRANULOCYTES NFR BLD AUTO: 1.5 % (ref 0–0.5)
INTERVENTRICULAR SEPTUM: 1.06 CM (ref 0.6–1.1)
IVRT: 74.59 MSEC
LDLC SERPL CALC-MCNC: 85 MG/DL (ref 63–159)
LEFT ATRIUM SIZE: 3.73 CM
LEFT INTERNAL DIMENSION IN SYSTOLE: 3.29 CM (ref 2.1–4)
LEFT VENTRICLE MASS INDEX: 77 G/M2
LEFT VENTRICULAR INTERNAL DIMENSION IN DIASTOLE: 4.7 CM (ref 3.5–6)
LEFT VENTRICULAR MASS: 178.14 G
LV LATERAL E/E' RATIO: 6.15 M/S
LV SEPTAL E/E' RATIO: 11.43 M/S
LYMPHOCYTES # BLD AUTO: 1.5 K/UL (ref 1–4.8)
LYMPHOCYTES NFR BLD: 14.7 % (ref 18–48)
MAGNESIUM SERPL-MCNC: 1.9 MG/DL (ref 1.6–2.6)
MCH RBC QN AUTO: 31.3 PG (ref 27–31)
MCHC RBC AUTO-ENTMCNC: 34.4 G/DL (ref 32–36)
MCV RBC AUTO: 91 FL (ref 82–98)
MONOCYTES # BLD AUTO: 1.2 K/UL (ref 0.3–1)
MONOCYTES NFR BLD: 11.5 % (ref 4–15)
MV PEAK A VEL: 0.43 M/S
MV PEAK E VEL: 0.8 M/S
NEUTROPHILS # BLD AUTO: 7.4 K/UL (ref 1.8–7.7)
NEUTROPHILS NFR BLD: 70.2 % (ref 38–73)
NONHDLC SERPL-MCNC: 106 MG/DL
NRBC BLD-RTO: 0 /100 WBC
PISA TR MAX VEL: 2.86 M/S
PLATELET # BLD AUTO: 352 K/UL (ref 150–450)
PMV BLD AUTO: 9.5 FL (ref 9.2–12.9)
POTASSIUM SERPL-SCNC: 3.9 MMOL/L (ref 3.5–5.1)
PROT SERPL-MCNC: 6.9 G/DL (ref 6–8.4)
PV PEAK VELOCITY: 124.18 CM/S
RA PRESSURE: 3 MMHG
RBC # BLD AUTO: 4.82 M/UL (ref 4.6–6.2)
RIGHT VENTRICULAR END-DIASTOLIC DIMENSION: 187 CM
SODIUM SERPL-SCNC: 141 MMOL/L (ref 136–145)
TDI LATERAL: 0.13 M/S
TDI SEPTAL: 0.07 M/S
TDI: 0.1 M/S
TR MAX PG: 33 MMHG
TRIGL SERPL-MCNC: 105 MG/DL (ref 30–150)
TROPONIN I SERPL DL<=0.01 NG/ML-MCNC: <0.03 NG/ML
TV REST PULMONARY ARTERY PRESSURE: 36 MMHG
WBC # BLD AUTO: 10.51 K/UL (ref 3.9–12.7)

## 2021-08-19 PROCEDURE — 85025 COMPLETE CBC W/AUTO DIFF WBC: CPT | Performed by: INTERNAL MEDICINE

## 2021-08-19 PROCEDURE — 93306 ECHO (CUPID ONLY): ICD-10-PCS | Mod: 26,,, | Performed by: INTERNAL MEDICINE

## 2021-08-19 PROCEDURE — 80061 LIPID PANEL: CPT | Performed by: INTERNAL MEDICINE

## 2021-08-19 PROCEDURE — 80053 COMPREHEN METABOLIC PANEL: CPT | Performed by: INTERNAL MEDICINE

## 2021-08-19 PROCEDURE — 36415 COLL VENOUS BLD VENIPUNCTURE: CPT | Performed by: INTERNAL MEDICINE

## 2021-08-19 PROCEDURE — 93306 TTE W/DOPPLER COMPLETE: CPT

## 2021-08-19 PROCEDURE — 25000003 PHARM REV CODE 250: Performed by: INTERNAL MEDICINE

## 2021-08-19 PROCEDURE — 83880 ASSAY OF NATRIURETIC PEPTIDE: CPT | Performed by: INTERNAL MEDICINE

## 2021-08-19 PROCEDURE — 93306 TTE W/DOPPLER COMPLETE: CPT | Mod: 26,,, | Performed by: INTERNAL MEDICINE

## 2021-08-19 PROCEDURE — 94761 N-INVAS EAR/PLS OXIMETRY MLT: CPT

## 2021-08-19 PROCEDURE — 83036 HEMOGLOBIN GLYCOSYLATED A1C: CPT | Performed by: INTERNAL MEDICINE

## 2021-08-19 PROCEDURE — 83735 ASSAY OF MAGNESIUM: CPT | Performed by: INTERNAL MEDICINE

## 2021-08-19 PROCEDURE — 63600175 PHARM REV CODE 636 W HCPCS: Performed by: INTERNAL MEDICINE

## 2021-08-19 PROCEDURE — 84484 ASSAY OF TROPONIN QUANT: CPT | Performed by: INTERNAL MEDICINE

## 2021-08-19 PROCEDURE — 99900031 HC PATIENT EDUCATION (STAT)

## 2021-08-19 PROCEDURE — 99900035 HC TECH TIME PER 15 MIN (STAT)

## 2021-08-19 RX ORDER — OXYCODONE AND ACETAMINOPHEN 10; 325 MG/1; MG/1
1 TABLET ORAL EVERY 6 HOURS PRN
Qty: 28 TABLET | Refills: 0 | Status: SHIPPED | OUTPATIENT
Start: 2021-08-19 | End: 2021-09-02

## 2021-08-19 RX ORDER — ALBUTEROL SULFATE 90 UG/1
2 AEROSOL, METERED RESPIRATORY (INHALATION) EVERY 6 HOURS PRN
Status: DISCONTINUED | OUTPATIENT
Start: 2021-08-19 | End: 2021-08-19 | Stop reason: HOSPADM

## 2021-08-19 RX ORDER — OXYCODONE AND ACETAMINOPHEN 10; 325 MG/1; MG/1
1 TABLET ORAL EVERY 4 HOURS PRN
Status: DISCONTINUED | OUTPATIENT
Start: 2021-08-19 | End: 2021-08-19 | Stop reason: HOSPADM

## 2021-08-19 RX ADMIN — OXYCODONE HYDROCHLORIDE AND ACETAMINOPHEN 1 TABLET: 10; 325 TABLET ORAL at 12:08

## 2021-08-19 RX ADMIN — MORPHINE SULFATE 4 MG: 4 INJECTION, SOLUTION INTRAMUSCULAR; INTRAVENOUS at 04:08

## 2021-08-19 RX ADMIN — HYDROCODONE BITARTRATE AND ACETAMINOPHEN 1 TABLET: 5; 325 TABLET ORAL at 12:08

## 2021-08-19 RX ADMIN — REMDESIVIR 200 MG: 100 INJECTION, POWDER, LYOPHILIZED, FOR SOLUTION INTRAVENOUS at 04:08

## 2021-08-19 RX ADMIN — ENOXAPARIN SODIUM 110 MG: 60 INJECTION SUBCUTANEOUS at 08:08

## 2021-08-19 RX ADMIN — MORPHINE SULFATE 4 MG: 4 INJECTION, SOLUTION INTRAMUSCULAR; INTRAVENOUS at 08:08

## 2021-08-23 ENCOUNTER — PATIENT MESSAGE (OUTPATIENT)
Dept: PULMONOLOGY | Facility: CLINIC | Age: 36
End: 2021-08-23

## 2021-08-23 ENCOUNTER — PATIENT MESSAGE (OUTPATIENT)
Dept: CARDIOLOGY | Facility: CLINIC | Age: 36
End: 2021-08-23

## 2021-09-02 ENCOUNTER — OFFICE VISIT (OUTPATIENT)
Dept: HEMATOLOGY/ONCOLOGY | Facility: CLINIC | Age: 36
End: 2021-09-02
Payer: MEDICAID

## 2021-09-02 VITALS
TEMPERATURE: 98 F | HEART RATE: 99 BPM | DIASTOLIC BLOOD PRESSURE: 86 MMHG | RESPIRATION RATE: 18 BRPM | SYSTOLIC BLOOD PRESSURE: 158 MMHG | WEIGHT: 241 LBS | HEIGHT: 72 IN | BODY MASS INDEX: 32.64 KG/M2

## 2021-09-02 DIAGNOSIS — I26.99 OTHER ACUTE PULMONARY EMBOLISM WITHOUT ACUTE COR PULMONALE: ICD-10-CM

## 2021-09-02 PROCEDURE — 99204 OFFICE O/P NEW MOD 45 MIN: CPT | Mod: S$GLB,,, | Performed by: INTERNAL MEDICINE

## 2021-09-02 PROCEDURE — 99204 PR OFFICE/OUTPT VISIT, NEW, LEVL IV, 45-59 MIN: ICD-10-PCS | Mod: S$GLB,,, | Performed by: INTERNAL MEDICINE

## 2021-09-02 RX ORDER — OXYCODONE HYDROCHLORIDE 5 MG/1
5 TABLET ORAL EVERY 4 HOURS PRN
Qty: 40 TABLET | Refills: 0 | Status: SHIPPED | OUTPATIENT
Start: 2021-09-02 | End: 2021-10-13

## 2021-09-09 ENCOUNTER — OFFICE VISIT (OUTPATIENT)
Dept: FAMILY MEDICINE | Facility: CLINIC | Age: 36
End: 2021-09-09
Payer: MEDICAID

## 2021-09-09 VITALS
DIASTOLIC BLOOD PRESSURE: 82 MMHG | OXYGEN SATURATION: 98 % | TEMPERATURE: 99 F | SYSTOLIC BLOOD PRESSURE: 138 MMHG | WEIGHT: 242.13 LBS | RESPIRATION RATE: 20 BRPM | HEIGHT: 72 IN | HEART RATE: 97 BPM | BODY MASS INDEX: 32.8 KG/M2

## 2021-09-09 DIAGNOSIS — Z09 HOSPITAL DISCHARGE FOLLOW-UP: ICD-10-CM

## 2021-09-09 DIAGNOSIS — E66.09 CLASS 1 OBESITY DUE TO EXCESS CALORIES WITH SERIOUS COMORBIDITY AND BODY MASS INDEX (BMI) OF 32.0 TO 32.9 IN ADULT: ICD-10-CM

## 2021-09-09 DIAGNOSIS — E78.2 MIXED HYPERLIPIDEMIA: ICD-10-CM

## 2021-09-09 DIAGNOSIS — I26.99 PULMONARY EMBOLISM, UNSPECIFIED CHRONICITY, UNSPECIFIED PULMONARY EMBOLISM TYPE, UNSPECIFIED WHETHER ACUTE COR PULMONALE PRESENT: ICD-10-CM

## 2021-09-09 DIAGNOSIS — I48.0 PAROXYSMAL ATRIAL FIBRILLATION: ICD-10-CM

## 2021-09-09 DIAGNOSIS — F41.1 GAD (GENERALIZED ANXIETY DISORDER): Primary | ICD-10-CM

## 2021-09-09 DIAGNOSIS — I82.5Y3 CHRONIC DEEP VEIN THROMBOSIS (DVT) OF PROXIMAL VEIN OF BOTH LOWER EXTREMITIES: ICD-10-CM

## 2021-09-09 DIAGNOSIS — U07.1 COVID-19 VIRUS INFECTION: ICD-10-CM

## 2021-09-09 PROCEDURE — 99215 OFFICE O/P EST HI 40 MIN: CPT | Performed by: NURSE PRACTITIONER

## 2021-09-09 PROCEDURE — 99214 PR OFFICE/OUTPT VISIT, EST, LEVL IV, 30-39 MIN: ICD-10-PCS | Mod: S$PBB,,, | Performed by: NURSE PRACTITIONER

## 2021-09-09 PROCEDURE — 99214 OFFICE O/P EST MOD 30 MIN: CPT | Mod: S$PBB,,, | Performed by: NURSE PRACTITIONER

## 2021-09-09 RX ORDER — FLUOXETINE 10 MG/1
10 CAPSULE ORAL DAILY
Qty: 30 CAPSULE | Refills: 1 | Status: SHIPPED | OUTPATIENT
Start: 2021-09-09 | End: 2021-10-13 | Stop reason: SDUPTHER

## 2021-10-13 ENCOUNTER — OFFICE VISIT (OUTPATIENT)
Dept: FAMILY MEDICINE | Facility: CLINIC | Age: 36
End: 2021-10-13
Payer: MEDICAID

## 2021-10-13 VITALS
WEIGHT: 245 LBS | DIASTOLIC BLOOD PRESSURE: 82 MMHG | BODY MASS INDEX: 33.18 KG/M2 | SYSTOLIC BLOOD PRESSURE: 120 MMHG | TEMPERATURE: 98 F | HEART RATE: 97 BPM | OXYGEN SATURATION: 98 % | HEIGHT: 72 IN

## 2021-10-13 DIAGNOSIS — R73.01 IMPAIRED FASTING GLUCOSE: ICD-10-CM

## 2021-10-13 DIAGNOSIS — I82.5Y3 CHRONIC DEEP VEIN THROMBOSIS (DVT) OF PROXIMAL VEIN OF BOTH LOWER EXTREMITIES: ICD-10-CM

## 2021-10-13 DIAGNOSIS — I48.0 PAROXYSMAL ATRIAL FIBRILLATION: ICD-10-CM

## 2021-10-13 DIAGNOSIS — F41.1 GAD (GENERALIZED ANXIETY DISORDER): Primary | ICD-10-CM

## 2021-10-13 DIAGNOSIS — E78.2 MIXED HYPERLIPIDEMIA: ICD-10-CM

## 2021-10-13 DIAGNOSIS — E66.09 CLASS 1 OBESITY DUE TO EXCESS CALORIES WITH SERIOUS COMORBIDITY AND BODY MASS INDEX (BMI) OF 33.0 TO 33.9 IN ADULT: ICD-10-CM

## 2021-10-13 PROCEDURE — 99214 OFFICE O/P EST MOD 30 MIN: CPT | Mod: S$PBB,,, | Performed by: NURSE PRACTITIONER

## 2021-10-13 PROCEDURE — 99214 PR OFFICE/OUTPT VISIT, EST, LEVL IV, 30-39 MIN: ICD-10-PCS | Mod: S$PBB,,, | Performed by: NURSE PRACTITIONER

## 2021-10-13 PROCEDURE — 99214 OFFICE O/P EST MOD 30 MIN: CPT | Performed by: NURSE PRACTITIONER

## 2021-10-13 RX ORDER — FLUOXETINE HYDROCHLORIDE 20 MG/1
20 CAPSULE ORAL DAILY
Qty: 90 CAPSULE | Refills: 1 | Status: SHIPPED | OUTPATIENT
Start: 2021-10-13 | End: 2022-01-13 | Stop reason: SDUPTHER

## 2022-01-11 ENCOUNTER — LAB VISIT (OUTPATIENT)
Dept: LAB | Facility: HOSPITAL | Age: 37
End: 2022-01-11
Attending: NURSE PRACTITIONER
Payer: MEDICAID

## 2022-01-11 DIAGNOSIS — F41.1 GAD (GENERALIZED ANXIETY DISORDER): ICD-10-CM

## 2022-01-11 DIAGNOSIS — E78.2 MIXED HYPERLIPIDEMIA: ICD-10-CM

## 2022-01-11 DIAGNOSIS — I48.0 PAROXYSMAL ATRIAL FIBRILLATION: ICD-10-CM

## 2022-01-11 DIAGNOSIS — R73.01 IMPAIRED FASTING GLUCOSE: ICD-10-CM

## 2022-01-11 LAB
ALBUMIN SERPL BCP-MCNC: 4.7 G/DL (ref 3.5–5.2)
ALP SERPL-CCNC: 67 U/L (ref 55–135)
ALT SERPL W/O P-5'-P-CCNC: 35 U/L (ref 10–44)
ANION GAP SERPL CALC-SCNC: 9 MMOL/L (ref 8–16)
AST SERPL-CCNC: 24 U/L (ref 10–40)
BILIRUB SERPL-MCNC: 1.5 MG/DL (ref 0.1–1)
BUN SERPL-MCNC: 17 MG/DL (ref 6–20)
CALCIUM SERPL-MCNC: 9.6 MG/DL (ref 8.7–10.5)
CHLORIDE SERPL-SCNC: 101 MMOL/L (ref 95–110)
CHOLEST SERPL-MCNC: 245 MG/DL (ref 120–199)
CHOLEST/HDLC SERPL: 6 {RATIO} (ref 2–5)
CO2 SERPL-SCNC: 29 MMOL/L (ref 23–29)
CREAT SERPL-MCNC: 1.3 MG/DL (ref 0.5–1.4)
EST. GFR  (AFRICAN AMERICAN): >60 ML/MIN/1.73 M^2
EST. GFR  (NON AFRICAN AMERICAN): >60 ML/MIN/1.73 M^2
ESTIMATED AVG GLUCOSE: 97 MG/DL (ref 68–131)
GLUCOSE SERPL-MCNC: 86 MG/DL (ref 70–110)
HBA1C MFR BLD: 5 % (ref 4.5–6.2)
HDLC SERPL-MCNC: 41 MG/DL (ref 40–75)
HDLC SERPL: 16.7 % (ref 20–50)
LDLC SERPL CALC-MCNC: ABNORMAL MG/DL (ref 63–159)
NONHDLC SERPL-MCNC: 204 MG/DL
POTASSIUM SERPL-SCNC: 4.1 MMOL/L (ref 3.5–5.1)
PROT SERPL-MCNC: 7.8 G/DL (ref 6–8.4)
SODIUM SERPL-SCNC: 139 MMOL/L (ref 136–145)
TRIGL SERPL-MCNC: 415 MG/DL (ref 30–150)

## 2022-01-11 PROCEDURE — 80061 LIPID PANEL: CPT | Performed by: NURSE PRACTITIONER

## 2022-01-11 PROCEDURE — 83036 HEMOGLOBIN GLYCOSYLATED A1C: CPT | Performed by: NURSE PRACTITIONER

## 2022-01-11 PROCEDURE — 80053 COMPREHEN METABOLIC PANEL: CPT | Performed by: NURSE PRACTITIONER

## 2022-01-11 PROCEDURE — 36415 COLL VENOUS BLD VENIPUNCTURE: CPT | Performed by: NURSE PRACTITIONER

## 2022-01-13 ENCOUNTER — OFFICE VISIT (OUTPATIENT)
Dept: FAMILY MEDICINE | Facility: CLINIC | Age: 37
End: 2022-01-13
Payer: MEDICAID

## 2022-01-13 ENCOUNTER — TELEPHONE (OUTPATIENT)
Dept: FAMILY MEDICINE | Facility: CLINIC | Age: 37
End: 2022-01-13
Payer: MEDICAID

## 2022-01-13 VITALS
DIASTOLIC BLOOD PRESSURE: 80 MMHG | HEART RATE: 91 BPM | TEMPERATURE: 98 F | OXYGEN SATURATION: 99 % | SYSTOLIC BLOOD PRESSURE: 120 MMHG | HEIGHT: 72 IN | WEIGHT: 256 LBS | BODY MASS INDEX: 34.67 KG/M2

## 2022-01-13 DIAGNOSIS — F41.1 GAD (GENERALIZED ANXIETY DISORDER): Primary | ICD-10-CM

## 2022-01-13 DIAGNOSIS — I82.5Y3 CHRONIC DEEP VEIN THROMBOSIS (DVT) OF PROXIMAL VEIN OF BOTH LOWER EXTREMITIES: ICD-10-CM

## 2022-01-13 DIAGNOSIS — E66.09 CLASS 1 OBESITY DUE TO EXCESS CALORIES WITH SERIOUS COMORBIDITY AND BODY MASS INDEX (BMI) OF 34.0 TO 34.9 IN ADULT: ICD-10-CM

## 2022-01-13 DIAGNOSIS — I48.0 PAROXYSMAL ATRIAL FIBRILLATION: ICD-10-CM

## 2022-01-13 DIAGNOSIS — R73.01 IMPAIRED FASTING GLUCOSE: ICD-10-CM

## 2022-01-13 DIAGNOSIS — E78.2 MIXED HYPERLIPIDEMIA: ICD-10-CM

## 2022-01-13 PROCEDURE — 1160F PR REVIEW ALL MEDS BY PRESCRIBER/CLIN PHARMACIST DOCUMENTED: ICD-10-PCS | Mod: ,,, | Performed by: NURSE PRACTITIONER

## 2022-01-13 PROCEDURE — 3044F HG A1C LEVEL LT 7.0%: CPT | Mod: ,,, | Performed by: NURSE PRACTITIONER

## 2022-01-13 PROCEDURE — 3008F BODY MASS INDEX DOCD: CPT | Mod: ,,, | Performed by: NURSE PRACTITIONER

## 2022-01-13 PROCEDURE — 3008F PR BODY MASS INDEX (BMI) DOCUMENTED: ICD-10-PCS | Mod: ,,, | Performed by: NURSE PRACTITIONER

## 2022-01-13 PROCEDURE — 3044F PR MOST RECENT HEMOGLOBIN A1C LEVEL <7.0%: ICD-10-PCS | Mod: ,,, | Performed by: NURSE PRACTITIONER

## 2022-01-13 PROCEDURE — 3074F SYST BP LT 130 MM HG: CPT | Mod: ,,, | Performed by: NURSE PRACTITIONER

## 2022-01-13 PROCEDURE — 99214 OFFICE O/P EST MOD 30 MIN: CPT | Mod: S$PBB,,, | Performed by: NURSE PRACTITIONER

## 2022-01-13 PROCEDURE — 3074F PR MOST RECENT SYSTOLIC BLOOD PRESSURE < 130 MM HG: ICD-10-PCS | Mod: ,,, | Performed by: NURSE PRACTITIONER

## 2022-01-13 PROCEDURE — 1160F RVW MEDS BY RX/DR IN RCRD: CPT | Mod: ,,, | Performed by: NURSE PRACTITIONER

## 2022-01-13 PROCEDURE — 3079F PR MOST RECENT DIASTOLIC BLOOD PRESSURE 80-89 MM HG: ICD-10-PCS | Mod: ,,, | Performed by: NURSE PRACTITIONER

## 2022-01-13 PROCEDURE — 3079F DIAST BP 80-89 MM HG: CPT | Mod: ,,, | Performed by: NURSE PRACTITIONER

## 2022-01-13 PROCEDURE — 99214 OFFICE O/P EST MOD 30 MIN: CPT | Performed by: NURSE PRACTITIONER

## 2022-01-13 PROCEDURE — 99214 PR OFFICE/OUTPT VISIT, EST, LEVL IV, 30-39 MIN: ICD-10-PCS | Mod: S$PBB,,, | Performed by: NURSE PRACTITIONER

## 2022-01-13 RX ORDER — FLUOXETINE HYDROCHLORIDE 40 MG/1
40 CAPSULE ORAL DAILY
Qty: 90 CAPSULE | Refills: 1 | Status: SHIPPED | OUTPATIENT
Start: 2022-01-13 | End: 2023-02-17

## 2022-01-13 NOTE — PATIENT INSTRUCTIONS
Patient Education       DASH Diet   About this topic   DASH stands for Dietary Approaches to Stop Hypertension. The DASH diet may help you lower blood pressure. It may also help keep you from getting high blood pressure. You will eat less fat and more fiber on the DASH diet.  This diet gives you more minerals that fight high blood pressure. Some nutrients in this diet are:  · Potassium ? Acts to help you get rid of salt. This may help to lower blood pressure.  · Calcium ? Makes blood vessels and muscles work the right way  · Magnesium - Helps blood vessels relax  · Fiber ? Helps you feel full. It also helps digestion.  What will the results be?   The DASH diet may help you:  · Lower your blood pressure and cholesterol  · Lower your risk for cancer, heart disease, heart attack, and stroke. It may also lower your risk for heart failure, kidney stones, and diabetes.  · Lose weight or keep a healthy weight  What lifestyle changes are needed?   · Add regular exercise to get the most help from this diet.  · Try to lower stress. Find ways to relax.  · Stop smoking. Avoid secondhand smoke.  · Limit alcohol intake.  What changes to diet are needed?   · Know about poor eating habits. Then, you can fix them as you work with the program.  · This diet encourages fruits and vegetables, whole grains, lean meats, healthy fats, and low-fat or fat-free dairy products.  · This diet is lower in saturated fats, trans-fats, cholesterol, added sugars, and sodium.  Who should use this diet?   This eating plan is good for the whole family. It is also good for people with high blood pressure and those at risk for high blood pressure.  What foods are good to eat?   · Grains: Try to eat 6 to 8 servings of whole grain, high fiber foods each day. These are bread, cereals, brown rice, or pasta.  · Fruits and vegetables: Eat 4 to 5 servings each day. Try to pick many kinds and colors. Fresh or frozen are best. Look for low sodium or salt-free if  you choose canned.  · Dairy: Try to eat 2 to 3 servings of fat free and low fat milk products each day.  · Lean meats, poultry, and seafood: Try to eat 6 servings or less of lean meats, poultry, and seafood each day. Try to choose more low fat or lean meats like chicken and turkey. Eat less red meat. Eat more fish instead.  · Nuts, seeds, and legumes (dry beans and peas): Try to eat 4 to 5 servings each week. Try to pick nuts such as almonds and walnuts, sunflower seeds, peanut butter, soy beans, lentils, kidney beans, and split peas.  · Fats and oils: Try to eat 2 to 3 servings of fats and oils each day. Eat good fats found in fish, nuts, and avocados. Try using olive oil or vegetable oils such as canola oil. Other good oils to try are corn, safflower, sunflower, or soybean oils. Use low-sodium and low-fat salad dressing and mayonnaise.  · Condiments: Pepper, herbs, spices, vinegar, lemon or lime juices are great for seasoning. Be careful to choose low-sodium or salt-free products if you use broths, soups, or soy sauce.  · Sweets: Try to eat less than 5 servings each week. Choose low-fat and trans fat-free desserts. These are things like fruit flavored gelatin, sorbet, jelly beans, more crackers, animal crackers, low-fat fig bars, and sahra snaps. Eat fruit to satisfy your desire for sweets.     What foods should be limited or avoided?   · Grains: Salted breads, rolls, crackers, quick breads, self-rising flours, biscuit mixes, regular bread crumbs, instant hot cereals, commercially-prepared rice, pasta, stuffing mixes  · Fruits and vegetables: Commercially-prepared potatoes and vegetable mixes, regular canned vegetables and juices, vegetables frozen with sauce or pickled vegetables, processed fruits with salt or sodium  · Milk: Whole milk, malted milk, chocolate milk, buttermilk, cheese, ice cream  · Meats and beans: Smoked, cured, salted, or canned fish; meats or poultry such as pereira, sausages, sardines;  high-fat cuts of meat like beef, lamb, or pork; chicken with the skin on it  · Fats: Cut back on solid fats like butter, lard, and margarine. Eat less food with high saturated fat, cholesterol and total fat.  · Condiments and snacks: Salted and canned peas, beans, and olives; salted snack foods; fried foods; soda or other sweetened drinks  · Sweets: High-fat baked goods such as muffins, donuts, pastries, commercial baked goods, candy bars  · If you choose to drink alcohol, limit the amount you drink. Women should have 1 drink or less per day and men should have 2 drinks or less per day.  Helpful tips   · Avoid eating canned vegetables and processed foods. These have a lot of salt in them. Look for a low-salt or low-sodium choice.  · Try baking or broiling instead of frying food.  · Write down the foods you eat. This will help you track what you have eaten each week.  · When you go to a grocery store, have a list or a meal plan. Do not shop when you are hungry to avoid cravings for foods.  · Read food labels with care. They will show you how much is in a serving. The amount is given as a percentage of the total amount you need each day. Reading labels will help you make healthy food choices.       · Avoid fast foods.  · Talk to your doctor or dietitian to see if you need vitamin and mineral supplements to help you balance your diet.  · Talk to a dietitian for help.  Where can I learn more?   [employer]y [employer] Dietetics  https://www.eatright.org/health/wellness/heart-and-cardiovascular-health/dash-diet-reducing-hypertension-through-diet-and-lifestyle   [employer].org  http://familydoctor.org/familydoctor/en/prevention-wellness/food-nutrition/weight-loss/the-dash-diet-healthy-eating-to-control-your-blood-pressure.html   Last Reviewed Date   2021-03-15  Consumer Information Use and Disclaimer   This information is not specific medical advice and does not replace information you receive from your health care  provider. This is only a brief summary of general information. It does NOT include all information about conditions, illnesses, injuries, tests, procedures, treatments, therapies, discharge instructions or life-style choices that may apply to you. You must talk with your health care provider for complete information about your health and treatment options. This information should not be used to decide whether or not to accept your health care providers advice, instructions or recommendations. Only your health care provider has the knowledge and training to provide advice that is right for you.  Copyright   Copyright © 2021 [employer] and its affiliates and/or licensors. All rights reserved.  Patient Education       Dietary Fats   About this topic   Fat is part of a healthy diet. How much fat you need is based on how many calories you need each day. The total amount of fat you eat should not be more than 35 percent of your calories for the day. All types of fat have 9 calories in each gram. Most foods have more than one kind of fat in them. Unsaturated fats are liquid at room temperature and may help your heart health.     What foods are good to eat?   Some kinds of fats like monounsaturated or polyunsaturated fats may help your blood cholesterol. You may see these kinds of fats listed on the food label.  Good sources of monounsaturated fat are:  · Olive, canola, peanut, and sesame oils  · Olives  · Peanut butter  · Nuts like almonds, peanuts, macadamia nuts, pecans, cashews, and hazelnuts  · Avocados  Good sources of polyunsaturated fat are:  · Fatty fish like salmon, tuna, and mackerel  · Soybean, corn, sunflower, and safflower oils  · Walnuts  · Seeds like sesame, pumpkin, ground flaxseed, and roshni  What foods should be limited or avoided?   Try to limit or avoid solid fats, like trans-fats and saturated fats. These kinds of fats raise your LDL (bad) cholesterol and raise your risk of heart disease. Read the  label to see what kind of fats are in the food you want to eat.  Trans-fat is often found in:  · Store-baked pastries, cookies, doughnuts, muffins, cakes, and pie crusts  · Stick margarine  · Packaged snack foods like crackers or microwave popcorn  · Fried foods  · Frozen pizza  · Non-dairy creamers  · Candy  If you need to lower your cholesterol, limit saturated fat to 5 to 6 percent or less of your total calories each day. Saturated fat is found in:  · High fat meat  · Chicken with the skin  · Whole fat dairy products  · Butter  · Palm and coconut oil  · Lard  What problems could happen?   All fat has the same number of calories. When you eat more fat of any kind, you may take in too many calories and gain weight. When you eat a large amount of saturated and trans-fat, you are at a higher risk for:  · Heart attack  · Stroke  · Other major health problems  Helpful tips   · Choose vegetable oils instead of solid fat when you bake or cook.  · Use dried beans or other legumes to replace some of the meat in casseroles and stews.  · Choose lean meat like fish or chicken without the skin.  · Limit your intake of fast food and fried foods.  · Use soft margarines over sticks of margarine.  · Eat two servings of fatty fish each week.  · Plan a snack of nuts or olives.  Where can I learn more?   American Heart Association  https://www.heart.org/en/health-topics/cholesterol/prevention-and-treatment-of-high-cholesterol-hyperlipidemia/the-skinny-on-fats   Last Reviewed Date   2021-05-18  Consumer Information Use and Disclaimer   This information is not specific medical advice and does not replace information you receive from your health care provider. This is only a brief summary of general information. It does NOT include all information about conditions, illnesses, injuries, tests, procedures, treatments, therapies, discharge instructions or life-style choices that may apply to you. You must talk with your health care  provider for complete information about your health and treatment options. This information should not be used to decide whether or not to accept your health care providers advice, instructions or recommendations. Only your health care provider has the knowledge and training to provide advice that is right for you.  Copyright   Copyright © 2021 [employer] and its affiliates and/or licensors. All rights reserved.

## 2022-01-13 NOTE — PROGRESS NOTES
Subjective:       Patient ID: Nabil Urias is a 36 y.o. male.    Chief Complaint: Hyperlipidemia, Anxiety, and lab review    Patient presents today following up for history of a-fib, fatigue, anxiety, hyperlipidemia, and is on current anticoagulant therapy. Patient is seeing hematology for management of clotting disorder.   Patient has been sporatic with his diet and inconsistent with medications. Patient's prozac was changed to 20 mg at his last visit 3 months ago but was given 10 mg at his last fill at the pharmacy. Patient has felt the medication has not been working as well for his anxiety but this is likely due to the decrease in dose unknowingly.   Patient is follow up with hematology and has had recent labs. Blood sugar is improved but cholesterol has worsened. This will be addressed today.  Patient is obese with a BMI of 34.72      Fatigue  This is a recurrent problem. The current episode started more than 1 month ago. The problem occurs daily. The problem has been waxing and waning. Pertinent negatives include no abdominal pain, arthralgias, chest pain, chills, congestion, coughing, diaphoresis, fatigue, fever, headaches, myalgias, nausea, rash, sore throat, vomiting or weakness. The symptoms are aggravated by walking and stress. He has tried rest and position changes for the symptoms. The treatment provided moderate relief.   Hyperlipidemia  This is a recurrent problem. The current episode started more than 1 month ago. The problem is controlled. Recent lipid tests were reviewed and are variable. Exacerbating diseases include obesity. He has no history of diabetes or hypothyroidism. Factors aggravating his hyperlipidemia include fatty foods. Pertinent negatives include no chest pain, focal weakness, leg pain, myalgias or shortness of breath. Current antihyperlipidemic treatment includes diet change (previously on statin but stopped due to leg pain/cramping). The current treatment provides moderate  improvement of lipids. Compliance problems include adherence to exercise and adherence to diet.  Risk factors for coronary artery disease include dyslipidemia, male sex, obesity, a sedentary lifestyle and stress.   Anxiety  Presents for follow-up visit. Onset was 1 to 6 months ago. The problem has been gradually worsening. Symptoms include excessive worry, insomnia, irritability, muscle tension, nervous/anxious behavior, palpitations and restlessness. Patient reports no chest pain, confusion, depressed mood, dizziness, nausea, shortness of breath or suicidal ideas. Symptoms occur most days. The severity of symptoms is moderate. The symptoms are aggravated by work stress. The quality of sleep is fair. Nighttime awakenings: occasional.     There are no known risk factors. His past medical history is significant for anxiety/panic attacks. Past treatments include lifestyle changes. The treatment provided mild relief. Compliance with prior treatments has been variable.     Review of Systems   Constitutional: Positive for irritability. Negative for appetite change, chills, diaphoresis, fatigue, fever and unexpected weight change.        Obesity   HENT: Negative for congestion, ear discharge, ear pain, hearing loss, sore throat, trouble swallowing and voice change.    Eyes: Negative for photophobia, pain and visual disturbance.   Respiratory: Negative for cough, chest tightness and shortness of breath.    Cardiovascular: Positive for palpitations. Negative for chest pain and leg swelling.        Hypertension, hyperlipidemia   Gastrointestinal: Negative for abdominal pain, constipation, diarrhea, nausea and vomiting.   Endocrine: Negative for cold intolerance and heat intolerance.        Impaired fasting glucose, improved   Genitourinary: Negative for difficulty urinating, dysuria and flank pain.   Musculoskeletal: Negative for arthralgias, gait problem and myalgias.   Skin: Negative for rash.   Allergic/Immunologic:  Negative for immunocompromised state.   Neurological: Negative for dizziness, focal weakness, weakness and headaches.   Hematological: Negative for adenopathy.        Current anticoagulation therapy   Psychiatric/Behavioral: Positive for dysphoric mood. Negative for agitation, confusion, self-injury and suicidal ideas. The patient is nervous/anxious and has insomnia.        Past Medical History:   Diagnosis Date    Atrial fibrillation     Hyperlipidemia       Past Surgical History:   Procedure Laterality Date    ABLATION OF ARRHYTHMOGENIC FOCUS FOR ATRIAL FIBRILLATION N/A 2021    Procedure: ABLATION, ARRHYTHMOGENIC FOCUS, FOR ATRIAL FIBRILLATION;  Surgeon: Daniel Handy MD;  Location: [facility] EP LAB;  Service: Cardiology;  Laterality: N/A;  AF, PVI, GABRIELA (Cx if SR), PVI, RFA, CARTO, GEN, GP, 3 PREP    CARDIOVERSION      TONSILLECTOMY         Family History   Problem Relation Age of Onset    Heart attack Maternal Grandfather        Social History     Socioeconomic History    Marital status:    Tobacco Use    Smoking status: Former Smoker     Packs/day: 1.00     Years: 5.00     Pack years: 5.00     Types: Cigarettes     Start date:      Quit date: 2015     Years since quittin.3    Smokeless tobacco: Current User     Types: Chew    Tobacco comment: 1/2 can chew qd   Substance and Sexual Activity    Alcohol use: Yes     Alcohol/week: 4.0 standard drinks     Types: 4 Cans of beer per week     Comment: weekly    Drug use: Yes     Frequency: 1.0 times per week     Types: Marijuana     Comment: Hx: Heroin (last use 2012) smokes weed ocassionally     Sexual activity: Yes     Partners: Female   Social History Narrative    ** Merged History Encounter **            Current Outpatient Medications   Medication Sig Dispense Refill    apixaban (ELIQUIS) 5 mg Tab Take 1 tablet (5 mg total) by mouth 2 (two) times daily. 60 tablet 6    aspirin (ECOTRIN) 81 MG EC tablet Take 1 tablet (81  mg total) by mouth once daily. 180 tablet 3    magnesium oxide (MAG-OX) 400 mg (241.3 mg magnesium) tablet Take 400 mg by mouth once daily.      FLUoxetine 40 MG capsule Take 1 capsule (40 mg total) by mouth once daily. 90 capsule 1     No current facility-administered medications for this visit.       Review of patient's allergies indicates:  No Known Allergies  Objective:      Blood pressure 120/80, pulse 91, temperature 98 °F (36.7 °C), height 6' (1.829 m), weight 116.1 kg (256 lb), SpO2 99 %. Body mass index is 34.72 kg/m².   Physical Exam  Vitals and nursing note reviewed.   Constitutional:       General: He is not in acute distress.     Appearance: Normal appearance. He is well-developed. He is obese. He is not ill-appearing.   HENT:      Head: Normocephalic and atraumatic.      Right Ear: Ear canal and external ear normal.      Left Ear: Ear canal and external ear normal.      Nose: Nose normal.      Mouth/Throat:      Mouth: Mucous membranes are moist.      Pharynx: Uvula midline.   Eyes:      General: Lids are normal.      Extraocular Movements: Extraocular movements intact.      Conjunctiva/sclera: Conjunctivae normal.      Pupils: Pupils are equal, round, and reactive to light.   Cardiovascular:      Rate and Rhythm: Normal rate and regular rhythm.      Pulses: Normal pulses.      Heart sounds: Normal heart sounds, S1 normal and S2 normal. No murmur heard.      Pulmonary:      Effort: Pulmonary effort is normal. No respiratory distress.      Breath sounds: Normal breath sounds. No wheezing or rhonchi.   Abdominal:      General: Bowel sounds are normal. There is no distension.      Palpations: Abdomen is soft.      Tenderness: There is no abdominal tenderness.   Musculoskeletal:         General: Normal range of motion.      Cervical back: Normal range of motion and neck supple.   Lymphadenopathy:      Cervical: No cervical adenopathy.   Skin:     General: Skin is warm and dry.      Findings: No rash.    Neurological:      General: No focal deficit present.      Mental Status: He is alert and oriented to person, place, and time. Mental status is at baseline.   Psychiatric:         Mood and Affect: Mood is anxious (Partially controlled) and depressed ( partially controlled).         Speech: Speech normal.         Behavior: Behavior normal.         Thought Content: Thought content normal.         Judgment: Judgment normal.             Assessment:       1. COLT (generalized anxiety disorder)    2. Mixed hyperlipidemia    3. Paroxysmal atrial fibrillation    4. Impaired fasting glucose    5. Chronic deep vein thrombosis (DVT) of proximal vein of both lower extremities    6. Class 1 obesity due to excess calories with serious comorbidity and body mass index (BMI) of 34.0 to 34.9 in adult        Plan:       Nabil was seen today for hyperlipidemia, anxiety and lab review.    Diagnoses and all orders for this visit:    COLT (generalized anxiety disorder)  -     FLUoxetine 40 MG capsule; Take 1 capsule (40 mg total) by mouth once daily.  Increasing fluoxetine dose from 20 mg to 40 mg    Mixed hyperlipidemia  -     Lipid Panel; Future  Limit red meat, butter, fried foods, cheese, and other foods that have a lot of saturated fat. Consume more: lean meats, fish, fruits, vegetables, whole grains, beans, lentils, and nuts.  Weight loss, and 30-45 min of cardiovascular exercise daily.     Paroxysmal atrial fibrillation  Stable.  Continue following with cardiology and hematology.    Impaired fasting glucose  -     Hemoglobin A1C; Future  -     Comprehensive Metabolic Panel; Future    Improved with diet control.     Discussed the following complications of DM Type 2: CAD, CVD, Retinopathy, Nephropathy, Neuropathy.    Discussed Target A1C Goal <7.0% and Target fasting blood sugars () before each meal.    Discussed Lifestyle Modifications: Low glycemic index diet, Exercise (30-45 min) daily, Weight loss, and Smoking cessation      Chronic deep vein thrombosis (DVT) of proximal vein of both lower extremities  Stable.  Follow up with hematology.    Class 1 obesity due to excess calories with serious comorbidity and body mass index (BMI) of 34.0 to 34.9 in adult  The patient is asked to make an attempt to improve diet and exercise patterns to aid in medical management of this problem.      Labs to be repeated in 3-4 months for blood sugar and cholesterol. Follow up with me at that time when labs have been completed.

## 2022-08-01 ENCOUNTER — OFFICE VISIT (OUTPATIENT)
Dept: FAMILY MEDICINE | Facility: CLINIC | Age: 37
End: 2022-08-01
Payer: MEDICAID

## 2022-08-01 VITALS
SYSTOLIC BLOOD PRESSURE: 156 MMHG | WEIGHT: 261 LBS | OXYGEN SATURATION: 98 % | HEART RATE: 97 BPM | TEMPERATURE: 98 F | DIASTOLIC BLOOD PRESSURE: 82 MMHG | BODY MASS INDEX: 35.35 KG/M2 | HEIGHT: 72 IN

## 2022-08-01 DIAGNOSIS — E78.2 MIXED HYPERLIPIDEMIA: ICD-10-CM

## 2022-08-01 DIAGNOSIS — F41.1 GAD (GENERALIZED ANXIETY DISORDER): ICD-10-CM

## 2022-08-01 DIAGNOSIS — I82.5Y3 CHRONIC DEEP VEIN THROMBOSIS (DVT) OF PROXIMAL VEIN OF BOTH LOWER EXTREMITIES: ICD-10-CM

## 2022-08-01 DIAGNOSIS — I48.0 PAROXYSMAL ATRIAL FIBRILLATION: ICD-10-CM

## 2022-08-01 DIAGNOSIS — E66.01 CLASS 2 SEVERE OBESITY DUE TO EXCESS CALORIES WITH SERIOUS COMORBIDITY AND BODY MASS INDEX (BMI) OF 35.0 TO 35.9 IN ADULT: ICD-10-CM

## 2022-08-01 DIAGNOSIS — I10 PRIMARY HYPERTENSION: Primary | ICD-10-CM

## 2022-08-01 PROCEDURE — 99214 PR OFFICE/OUTPT VISIT, EST, LEVL IV, 30-39 MIN: ICD-10-PCS | Mod: S$PBB,,, | Performed by: NURSE PRACTITIONER

## 2022-08-01 PROCEDURE — 3079F PR MOST RECENT DIASTOLIC BLOOD PRESSURE 80-89 MM HG: ICD-10-PCS | Mod: CPTII,,, | Performed by: NURSE PRACTITIONER

## 2022-08-01 PROCEDURE — 1160F RVW MEDS BY RX/DR IN RCRD: CPT | Mod: CPTII,,, | Performed by: NURSE PRACTITIONER

## 2022-08-01 PROCEDURE — 3079F DIAST BP 80-89 MM HG: CPT | Mod: CPTII,,, | Performed by: NURSE PRACTITIONER

## 2022-08-01 PROCEDURE — 1160F PR REVIEW ALL MEDS BY PRESCRIBER/CLIN PHARMACIST DOCUMENTED: ICD-10-PCS | Mod: CPTII,,, | Performed by: NURSE PRACTITIONER

## 2022-08-01 PROCEDURE — 1159F MED LIST DOCD IN RCRD: CPT | Mod: CPTII,,, | Performed by: NURSE PRACTITIONER

## 2022-08-01 PROCEDURE — 3008F BODY MASS INDEX DOCD: CPT | Mod: CPTII,,, | Performed by: NURSE PRACTITIONER

## 2022-08-01 PROCEDURE — 3077F PR MOST RECENT SYSTOLIC BLOOD PRESSURE >= 140 MM HG: ICD-10-PCS | Mod: CPTII,,, | Performed by: NURSE PRACTITIONER

## 2022-08-01 PROCEDURE — 99214 OFFICE O/P EST MOD 30 MIN: CPT | Mod: S$PBB,,, | Performed by: NURSE PRACTITIONER

## 2022-08-01 PROCEDURE — 1159F PR MEDICATION LIST DOCUMENTED IN MEDICAL RECORD: ICD-10-PCS | Mod: CPTII,,, | Performed by: NURSE PRACTITIONER

## 2022-08-01 PROCEDURE — 3077F SYST BP >= 140 MM HG: CPT | Mod: CPTII,,, | Performed by: NURSE PRACTITIONER

## 2022-08-01 PROCEDURE — 3044F HG A1C LEVEL LT 7.0%: CPT | Mod: CPTII,,, | Performed by: NURSE PRACTITIONER

## 2022-08-01 PROCEDURE — 3044F PR MOST RECENT HEMOGLOBIN A1C LEVEL <7.0%: ICD-10-PCS | Mod: CPTII,,, | Performed by: NURSE PRACTITIONER

## 2022-08-01 PROCEDURE — 4010F ACE/ARB THERAPY RXD/TAKEN: CPT | Mod: CPTII,,, | Performed by: NURSE PRACTITIONER

## 2022-08-01 PROCEDURE — 3008F PR BODY MASS INDEX (BMI) DOCUMENTED: ICD-10-PCS | Mod: CPTII,,, | Performed by: NURSE PRACTITIONER

## 2022-08-01 PROCEDURE — 99214 OFFICE O/P EST MOD 30 MIN: CPT | Performed by: NURSE PRACTITIONER

## 2022-08-01 PROCEDURE — 4010F PR ACE/ARB THEARPY RXD/TAKEN: ICD-10-PCS | Mod: CPTII,,, | Performed by: NURSE PRACTITIONER

## 2022-08-01 RX ORDER — LOSARTAN POTASSIUM 50 MG/1
50 TABLET ORAL DAILY
Qty: 30 TABLET | Refills: 1 | Status: SHIPPED | OUTPATIENT
Start: 2022-08-01 | End: 2023-02-17

## 2022-08-01 NOTE — PROGRESS NOTES
Subjective:       Patient ID: Nabil Urias is a 37 y.o. male.    Chief Complaint: Dizziness    Dizziness:   Chronicity:  New  Onset:  1 to 4 weeks ago  Progression since onset:  Resolved, then recurrent  Frequency:  Every few hours  Severity:  Moderate  Dizziness characteristics:  Off-balance, sensation of movement and trouble focusing eyes   Associated symptoms: headaches, visual disturbances and chest pain.no hearing loss, no ear congestion, no ear pain, no fever, no tinnitus, no nausea, no vomiting, no diaphoresis, no aural fullness, no weakness, no light-headedness, no syncope, no palpitations, no panic, no facial weakness, no slurred speech and no numbness in extremities.  Aggravated by:  Exertion  Treatments tried:  Rest  Improvements on treatment:  Mild   PMH includes: anxiety.no head trauma, no head trauma and no ear infections.  Hypertension  This is a new problem. The current episode started 1 to 4 weeks ago. The problem has been waxing and waning since onset. The problem is uncontrolled. Associated symptoms include anxiety, blurred vision, chest pain, headaches and malaise/fatigue. Pertinent negatives include no neck pain, orthopnea, palpitations, peripheral edema, PND, shortness of breath or sweats. Risk factors for coronary artery disease include sedentary lifestyle, male gender, obesity, dyslipidemia and stress. Past treatments include lifestyle changes. The current treatment provides no improvement. Compliance problems include exercise and diet.  There is no history of angina. There is no history of chronic renal disease.     Review of Systems   Constitutional: Positive for activity change, fatigue and malaise/fatigue. Negative for appetite change, chills, diaphoresis, fever and unexpected weight change.        Obesity   HENT: Negative for congestion, ear discharge, ear pain, hearing loss, sore throat, tinnitus, trouble swallowing and voice change.    Eyes: Positive for blurred vision. Negative for  photophobia, pain and visual disturbance.   Respiratory: Negative for cough, chest tightness and shortness of breath.    Cardiovascular: Positive for chest pain. Negative for palpitations, orthopnea, leg swelling, syncope and PND.        Facial flushing   Gastrointestinal: Negative for abdominal pain, constipation, diarrhea, nausea and vomiting.   Endocrine: Negative for cold intolerance and heat intolerance.   Genitourinary: Negative for difficulty urinating, dysuria and flank pain.   Musculoskeletal: Negative for arthralgias, gait problem, myalgias and neck pain.   Skin: Negative for rash.   Allergic/Immunologic: Negative for immunocompromised state.   Neurological: Positive for dizziness and headaches. Negative for weakness and light-headedness.   Hematological: Negative for adenopathy.   Psychiatric/Behavioral: Negative for agitation, confusion, self-injury and suicidal ideas. The patient is nervous/anxious.        Past Medical History:   Diagnosis Date    Atrial fibrillation     Hyperlipidemia       Past Surgical History:   Procedure Laterality Date    ABLATION OF ARRHYTHMOGENIC FOCUS FOR ATRIAL FIBRILLATION N/A 2021    Procedure: ABLATION, ARRHYTHMOGENIC FOCUS, FOR ATRIAL FIBRILLATION;  Surgeon: Daniel Handy MD;  Location: [facility] EP LAB;  Service: Cardiology;  Laterality: N/A;  AF, PVI, GABRIELA (Cx if SR), PVI, RFA, CARTO, GEN, GP, 3 PREP    CARDIOVERSION      TONSILLECTOMY         Family History   Problem Relation Age of Onset    Heart attack Maternal Grandfather        Social History     Socioeconomic History    Marital status:    Tobacco Use    Smoking status: Former Smoker     Packs/day: 1.00     Years: 5.00     Pack years: 5.00     Types: Cigarettes     Start date:      Quit date: 2015     Years since quittin.8    Smokeless tobacco: Current User     Types: Chew    Tobacco comment: 1/2 can chew qd   Substance and Sexual Activity    Alcohol use: Yes     Alcohol/week: 4.0  standard drinks     Types: 4 Cans of beer per week     Comment: weekly    Drug use: Yes     Frequency: 1.0 times per week     Types: Marijuana     Comment: Hx: Heroin (last use April 2012) smokes weed ocassionally     Sexual activity: Yes     Partners: Female   Social History Narrative    ** Merged History Encounter **            Current Outpatient Medications   Medication Sig Dispense Refill    apixaban (ELIQUIS) 5 mg Tab Take 1 tablet (5 mg total) by mouth 2 (two) times daily. 60 tablet 6    aspirin (ECOTRIN) 81 MG EC tablet Take 1 tablet (81 mg total) by mouth once daily. 180 tablet 3    FLUoxetine 40 MG capsule Take 1 capsule (40 mg total) by mouth once daily. 90 capsule 1    magnesium oxide (MAG-OX) 400 mg (241.3 mg magnesium) tablet Take 400 mg by mouth once daily.      losartan (COZAAR) 50 MG tablet Take 1 tablet (50 mg total) by mouth once daily. 30 tablet 1     No current facility-administered medications for this visit.       Review of patient's allergies indicates:  No Known Allergies  Objective:      Blood pressure (!) 156/82, pulse 97, temperature 97.9 °F (36.6 °C), height 6' (1.829 m), weight 118.4 kg (261 lb), SpO2 98 %. Body mass index is 35.4 kg/m².   Physical Exam  Vitals and nursing note reviewed.   Constitutional:       General: He is not in acute distress.     Appearance: Normal appearance. He is well-developed. He is obese. He is not ill-appearing.   HENT:      Head: Normocephalic and atraumatic.      Right Ear: Tympanic membrane, ear canal and external ear normal.      Left Ear: Tympanic membrane, ear canal and external ear normal.      Nose: Nose normal.      Mouth/Throat:      Mouth: Mucous membranes are moist.      Pharynx: Uvula midline.   Eyes:      General: Lids are normal.      Extraocular Movements: Extraocular movements intact.      Conjunctiva/sclera: Conjunctivae normal.      Pupils: Pupils are equal, round, and reactive to light.   Cardiovascular:      Rate and Rhythm:  Normal rate and regular rhythm.      Pulses: Normal pulses.      Heart sounds: Normal heart sounds, S1 normal and S2 normal. No murmur heard.  Pulmonary:      Effort: Pulmonary effort is normal. No respiratory distress.      Breath sounds: Normal breath sounds. No wheezing.   Abdominal:      General: Bowel sounds are normal.      Palpations: Abdomen is soft.      Tenderness: There is no abdominal tenderness.   Musculoskeletal:         General: Normal range of motion.      Cervical back: Normal range of motion and neck supple.   Lymphadenopathy:      Cervical: No cervical adenopathy.   Skin:     General: Skin is warm and dry.      Findings: No rash.   Neurological:      General: No focal deficit present.      Mental Status: He is alert and oriented to person, place, and time. Mental status is at baseline.   Psychiatric:         Mood and Affect: Mood normal.         Speech: Speech normal.         Behavior: Behavior normal.         Thought Content: Thought content normal.         Judgment: Judgment normal.             Assessment:       1. Primary hypertension    2. COLT (generalized anxiety disorder)    3. Paroxysmal atrial fibrillation    4. Chronic deep vein thrombosis (DVT) of proximal vein of both lower extremities    5. Mixed hyperlipidemia    6. Class 2 severe obesity due to excess calories with serious comorbidity and body mass index (BMI) of 35.0 to 35.9 in adult        Plan:       Nabil was seen today for dizziness.    Diagnoses and all orders for this visit:    Primary hypertension  -     losartan (COZAAR) 50 MG tablet; Take 1 tablet (50 mg total) by mouth once daily.  Initially starting on losartan 50 mg 1 tablet in the morning daily.    Lifestyle changes: Reduce the amount of salt in your diet; Lose weight; Avoid drinking too much alcohol; Exercise at least 30 minutes per day most days of the week.  Continue current medications and home BP monitoring.     COLT (generalized anxiety disorder)  Continue  fluoxetine at 40 mg dose.  Patient is unsure if this is working but contributes symptoms of facial flushing and headedness to his anxiety.  Patient should continue the fluoxetine for now and start losartan to determine if this is only blood pressure related.  Patient only feels like his fluoxetine has not been working well over the last month.    Paroxysmal atrial fibrillation  Stable.  Continue Eliquis    Chronic deep vein thrombosis (DVT) of proximal vein of both lower extremities  Stable.  Continue Eliquis    Mixed hyperlipidemia  Limit red meat, butter, fried foods, cheese, and other foods that have a lot of saturated fat. Consume more: lean meats, fish, fruits, vegetables, whole grains, beans, lentils, and nuts.  Weight loss, and 30-45 min of cardiovascular exercise daily.     Obtain labs ordered.    Class 2 severe obesity due to excess calories with serious comorbidity and body mass index (BMI) of 35.0 to 35.9 in adult  The patient is asked to make an attempt to improve diet and exercise patterns to aid in medical management of this problem.

## 2022-08-30 ENCOUNTER — TELEPHONE (OUTPATIENT)
Dept: CARDIOLOGY | Facility: CLINIC | Age: 37
End: 2022-08-30
Payer: MEDICAID

## 2022-08-30 NOTE — TELEPHONE ENCOUNTER
----- Message from Db Melendez MA sent at 8/30/2022 10:03 AM CDT -----  Contact: PANKAJ SUMMERS [[id number]]  Type: Needs Medical Advice    Who Called: PANKAJ SUMMERS [[id number]]  Best Call Back Number: [phone]  Inquiry/Question: Please call PANKAJ SUMMERS [[id number]] regarding sooner appt having problem/concerns with blood pressure      Thank you~

## 2022-09-22 ENCOUNTER — TELEPHONE (OUTPATIENT)
Dept: CARDIOLOGY | Facility: CLINIC | Age: 37
End: 2022-09-22
Payer: MEDICAID

## 2022-09-22 ENCOUNTER — HOSPITAL ENCOUNTER (EMERGENCY)
Facility: HOSPITAL | Age: 37
Discharge: HOME | End: 2022-09-22
Attending: EMERGENCY MEDICINE
Payer: MEDICAID

## 2022-09-22 ENCOUNTER — TELEPHONE (OUTPATIENT)
Dept: ORTHOPEDICS | Facility: CLINIC | Age: 37
End: 2022-09-22
Payer: MEDICAID

## 2022-09-22 VITALS
DIASTOLIC BLOOD PRESSURE: 93 MMHG | RESPIRATION RATE: 16 BRPM | WEIGHT: 260.94 LBS | BODY MASS INDEX: 35.39 KG/M2 | OXYGEN SATURATION: 99 % | HEART RATE: 84 BPM | TEMPERATURE: 98 F | SYSTOLIC BLOOD PRESSURE: 144 MMHG

## 2022-09-22 DIAGNOSIS — I26.99 OTHER ACUTE PULMONARY EMBOLISM WITHOUT ACUTE COR PULMONALE: ICD-10-CM

## 2022-09-22 DIAGNOSIS — M25.532 LEFT WRIST PAIN: ICD-10-CM

## 2022-09-22 DIAGNOSIS — S52.572A OTHER INTRAARTICULAR FRACTURE OF LOWER END OF LEFT RADIUS, INITIAL ENCOUNTER FOR CLOSED FRACTURE: Primary | ICD-10-CM

## 2022-09-22 LAB
HCV AB SERPL QL IA: NORMAL
HIV 1+2 AB+HIV1 P24 AG SERPL QL IA: NORMAL

## 2022-09-22 PROCEDURE — 63600175 PHARM REV CODE 636 W HCPCS: Performed by: EMERGENCY MEDICINE

## 2022-09-22 PROCEDURE — 25000003 PHARM REV CODE 250: Performed by: EMERGENCY MEDICINE

## 2022-09-22 PROCEDURE — 87389 HIV-1 AG W/HIV-1&-2 AB AG IA: CPT | Performed by: EMERGENCY MEDICINE

## 2022-09-22 PROCEDURE — 96372 THER/PROPH/DIAG INJ SC/IM: CPT | Performed by: EMERGENCY MEDICINE

## 2022-09-22 PROCEDURE — 36415 COLL VENOUS BLD VENIPUNCTURE: CPT | Performed by: EMERGENCY MEDICINE

## 2022-09-22 PROCEDURE — 86803 HEPATITIS C AB TEST: CPT | Performed by: EMERGENCY MEDICINE

## 2022-09-22 PROCEDURE — 29125 APPL SHORT ARM SPLINT STATIC: CPT | Mod: LT

## 2022-09-22 PROCEDURE — 99284 EMERGENCY DEPT VISIT MOD MDM: CPT | Mod: 25

## 2022-09-22 RX ORDER — OXYCODONE AND ACETAMINOPHEN 5; 325 MG/1; MG/1
1 TABLET ORAL
Status: COMPLETED | OUTPATIENT
Start: 2022-09-22 | End: 2022-09-22

## 2022-09-22 RX ORDER — HYDROCODONE BITARTRATE AND IBUPROFEN 7.5; 2 MG/1; MG/1
1 TABLET, FILM COATED ORAL EVERY 8 HOURS PRN
Qty: 15 TABLET | Refills: 0 | Status: SHIPPED | OUTPATIENT
Start: 2022-09-22 | End: 2023-02-17

## 2022-09-22 RX ORDER — MORPHINE SULFATE 2 MG/ML
12 INJECTION, SOLUTION INTRAMUSCULAR; INTRAVENOUS
Status: COMPLETED | OUTPATIENT
Start: 2022-09-22 | End: 2022-09-22

## 2022-09-22 RX ADMIN — OXYCODONE HYDROCHLORIDE AND ACETAMINOPHEN 1 TABLET: 5; 325 TABLET ORAL at 07:09

## 2022-09-22 RX ADMIN — MORPHINE SULFATE 12 MG: 2 INJECTION, SOLUTION INTRAMUSCULAR; INTRAVENOUS at 08:09

## 2022-09-22 NOTE — ED PROVIDER NOTES
Encounter Date: 2022       History     Chief Complaint   Patient presents with    Wrist Injury     37-year-old male right-handed presents to the ER with left wrist pain.  Patient is a  and fell on outstretched left hand yesterday while playing football.  He states he has been taking ibuprofen but pain has persisted and worsened.  Worse with movements.  Denies any elbow or shoulder pain.  Did not hit his head.  Did not lose consciousness.  Denies any nausea vomiting.  No bleeding.  Patient otherwise has no complaints and is not hurting anywhere else.    The history is provided by the patient. No  was used.   Review of patient's allergies indicates:  No Known Allergies  Past Medical History:   Diagnosis Date    Atrial fibrillation     Essential (primary) hypertension     History of cardiac radiofrequency ablation     Hyperlipidemia      Past Surgical History:   Procedure Laterality Date    ABLATION OF ARRHYTHMOGENIC FOCUS FOR ATRIAL FIBRILLATION N/A 2021    Procedure: ABLATION, ARRHYTHMOGENIC FOCUS, FOR ATRIAL FIBRILLATION;  Surgeon: Daniel Handy MD;  Location: [facility] EP LAB;  Service: Cardiology;  Laterality: N/A;  AF, PVI, GABRIELA (Cx if SR), PVI, RFA, CARTO, GEN, GP, 3 PREP    CARDIOVERSION      TONSILLECTOMY       Family History   Problem Relation Age of Onset    Heart attack Maternal Grandfather      Social History     Tobacco Use    Smoking status: Former     Packs/day: 1.00     Years: 5.00     Pack years: 5.00     Types: Cigarettes     Start date:      Quit date: 2015     Years since quittin.0    Smokeless tobacco: Current     Types: Chew    Tobacco comments:     1/2 can chew qd   Substance Use Topics    Alcohol use: Yes     Alcohol/week: 4.0 standard drinks     Types: 4 Cans of beer per week     Comment: rarely    Drug use: Yes     Frequency: 1.0 times per week     Types: Marijuana     Comment: Hx: Heroin (last use 2012) smokes weed ocassionally       Review of Systems   Constitutional:  Negative for activity change and fever.   HENT:  Negative for facial swelling.    Respiratory:  Negative for shortness of breath.    Genitourinary:  Negative for flank pain.   Musculoskeletal:  Positive for arthralgias and myalgias. Negative for back pain, gait problem, neck pain and neck stiffness.   Neurological:  Negative for weakness.   Psychiatric/Behavioral:  Negative for confusion.      Physical Exam     Initial Vitals [09/22/22 0621]   BP Pulse Resp Temp SpO2   (!) 167/109 93 16 98 °F (36.7 °C) 99 %      MAP       --         Physical Exam    Constitutional: Vital signs are normal. He is not diaphoretic.  Non-toxic appearance. He does not have a sickly appearance. No distress.   HENT:   Head: Normocephalic and atraumatic.   Eyes: Conjunctivae are normal.   Neck: Phonation normal. Neck supple. No thyromegaly present. No tracheal deviation present.   Cardiovascular:  Normal rate.           Pulmonary/Chest: No respiratory distress.   Abdominal: He exhibits no distension.   Musculoskeletal:         General: Tenderness and edema present.      Cervical back: Neck supple.      Comments: Swelling and tenderness to left wrist.  2+ radial pulses.  Sensation intact to light touch distally injury.  Warm well perfused distally.  Patient has decreased range of motion of the wrist and hand secondary to pain.     Neurological: He is alert and oriented to person, place, and time.   Skin: Skin is warm, dry and intact. No pallor.   Psychiatric: He has a normal mood and affect. His speech is normal and behavior is normal. Thought content normal.       ED Course   Procedures  Labs Reviewed   HIV 1 / 2 ANTIBODY    Narrative:     Release to patient->Immediate   HEPATITIS C ANTIBODY    Narrative:     Release to patient->Immediate          Imaging Results              X-Ray Wrist Complete Left (Final result)  Result time 09/22/22 08:15:37   Procedure changed from X-Ray Wrist Complete Left      Final result by Jacey Clark MD (09/22/22 08:15:37)                   Impression:      There is a a displaced distal radial fracture with intra-articular extension.      Electronically signed by: Jacey Clark MD  Date:    09/22/2022  Time:    08:15               Narrative:    EXAMINATION:  XR HAND COMPLETE 3 VIEW LEFT; XR WRIST COMPLETE 3 VIEWS LEFT    CLINICAL HISTORY:  fall;; fall.  left wrist pain;. Pain in left wrist    TECHNIQUE:  PA, lateral, and oblique views of the left hand were performed.    COMPARISON:  02/09/2021    FINDINGS:  There is a distal radial fracture with intra-articular extension.  There is deformity of the distal radius.  No other fractures are identified.                                       X-Ray Hand 3 View Left (Final result)  Result time 09/22/22 08:15:37   Procedure changed from X-Ray Hand 3 view Right     Final result by Jacey Clark MD (09/22/22 08:15:37)                   Impression:      There is a a displaced distal radial fracture with intra-articular extension.      Electronically signed by: Jacey Clark MD  Date:    09/22/2022  Time:    08:15               Narrative:    EXAMINATION:  XR HAND COMPLETE 3 VIEW LEFT; XR WRIST COMPLETE 3 VIEWS LEFT    CLINICAL HISTORY:  fall;; fall.  left wrist pain;. Pain in left wrist    TECHNIQUE:  PA, lateral, and oblique views of the left hand were performed.    COMPARISON:  02/09/2021    FINDINGS:  There is a distal radial fracture with intra-articular extension.  There is deformity of the distal radius.  No other fractures are identified.                                       Medications   oxyCODONE-acetaminophen 5-325 mg per tablet 1 tablet (1 tablet Oral Given 9/22/22 0719)   morphine injection 12 mg (12 mg Intramuscular Given 9/22/22 0834)     Medical Decision Making:   ED Management:  36 yo RH male pw FOOSH injury of left wrist. Suspect likely fracture. XR ordered. Pt signed out to Dr. Cole at 7AM with images pending.             ED Course as of 09/22/22 1618   Thu Sep 22, 2022   0833 I assumed care of the patient at 7:00 a.m..  He is found to have an intra-articular fracture of the distal left radius.  He is placed in a short-arm splint and referred to Orthopedic surgery [EH]      ED Course User Index  [EH] Keanu Cole III, MD                 Clinical Impression:   Final diagnoses:  [S51.402K] Other intraarticular fracture of lower end of left radius, initial encounter for closed fracture (Primary)      ED Disposition Condition    Discharge Stable          ED Prescriptions       Medication Sig Dispense Start Date End Date Auth. Provider    hydrocodone-ibuprofen 7.5-200 mg (VICOPROFEN) 7.5-200 mg per tablet Take 1 tablet by mouth every 8 (eight) hours as needed for Pain. 15 tablet 9/22/2022 -- Keanu Cole III, MD          Follow-up Information       Follow up With Specialties Details Why Contact Info    Roland Mcclelland MD Sports Medicine, Orthopedic Surgery In 3 days  [address] [place] [address] [address]  [place] [address]  [phone]               Vidal Singleton MD  09/22/22 1618       Vidal Singleton MD  09/22/22 161

## 2022-09-22 NOTE — ED NOTES
37 y.o. male to ED with c.o. left wrist pain and swelling that began after he was tackled during his sons football game. Patient reports he tried to break his fall and landed on his wrist. Patient denies trauma/ injury anywhere else and denies all other medical complaints at this time. Patient has 2+ radial pulses bilaterally, equal sensation bilaterally. Patient awake, alert, and oriented x 4. No apparent distress noted. VS currently stable. Bed placed in low locked position, side rails up x 2, call light is within reach of patient orientation to room and explanation of wait provided to patient, alarms set and turned on for monitor and pulse ox, awaiting MD evaluation and orders, will continue to monitor.

## 2022-09-22 NOTE — ED NOTES
Pt aaox4 from home c/o left wrist pain and swelling since yesterday after being injured at football practice. Pt states that he has been taking ibuprofen without any relief.

## 2022-09-22 NOTE — TELEPHONE ENCOUNTER
----- Message from Jose Hagan MA sent at 9/22/2022  9:22 AM CDT -----  Contact: wife/Kendall Argueta called in and stated patient went to ER at [facility] earlier today 9/22/22 and was diagnosed with intraarticular fracture of lower end of left radius, initial encounter for closed fracture.    Kendall would like a call back to schedule. # [phone]

## 2022-09-22 NOTE — TELEPHONE ENCOUNTER
----- Message from Abelardo Da Silva sent at 9/22/2022  9:43 AM CDT -----  Type:  Sooner Appointment Request    Caller is requesting a sooner appointment.  Caller declined first available appointment listed below.  Caller will not accept being placed on the waitlist and is requesting a message be sent to doctor.    Name of Caller:  Pt's spouse  When is the first available appointment?     Symptoms:   Bp High   Best Call Back Number:  [phone]    Additional Information:  Please advise -- Thank you

## 2022-09-27 ENCOUNTER — OFFICE VISIT (OUTPATIENT)
Dept: ORTHOPEDICS | Facility: CLINIC | Age: 37
End: 2022-09-27
Payer: MEDICAID

## 2022-09-27 VITALS — RESPIRATION RATE: 18 BRPM | BODY MASS INDEX: 35.21 KG/M2 | WEIGHT: 260 LBS | HEIGHT: 72 IN

## 2022-09-27 DIAGNOSIS — S52.572A OTHER CLOSED INTRA-ARTICULAR FRACTURE OF DISTAL END OF LEFT RADIUS, INITIAL ENCOUNTER: Primary | ICD-10-CM

## 2022-09-27 PROCEDURE — 99203 PR OFFICE/OUTPT VISIT, NEW, LEVL III, 30-44 MIN: ICD-10-PCS | Mod: S$PBB,,, | Performed by: ORTHOPAEDIC SURGERY

## 2022-09-27 PROCEDURE — 99203 OFFICE O/P NEW LOW 30 MIN: CPT | Mod: S$PBB,,, | Performed by: ORTHOPAEDIC SURGERY

## 2022-09-27 PROCEDURE — 1159F MED LIST DOCD IN RCRD: CPT | Mod: CPTII,,, | Performed by: ORTHOPAEDIC SURGERY

## 2022-09-27 PROCEDURE — 1159F PR MEDICATION LIST DOCUMENTED IN MEDICAL RECORD: ICD-10-PCS | Mod: CPTII,,, | Performed by: ORTHOPAEDIC SURGERY

## 2022-09-27 PROCEDURE — 99212 OFFICE O/P EST SF 10 MIN: CPT | Mod: PBBFAC,PN | Performed by: ORTHOPAEDIC SURGERY

## 2022-09-27 PROCEDURE — 3008F BODY MASS INDEX DOCD: CPT | Mod: CPTII,,, | Performed by: ORTHOPAEDIC SURGERY

## 2022-09-27 PROCEDURE — 3044F PR MOST RECENT HEMOGLOBIN A1C LEVEL <7.0%: ICD-10-PCS | Mod: CPTII,,, | Performed by: ORTHOPAEDIC SURGERY

## 2022-09-27 PROCEDURE — 4010F PR ACE/ARB THEARPY RXD/TAKEN: ICD-10-PCS | Mod: CPTII,,, | Performed by: ORTHOPAEDIC SURGERY

## 2022-09-27 PROCEDURE — 99999 PR PBB SHADOW E&M-EST. PATIENT-LVL II: ICD-10-PCS | Mod: PBBFAC,,, | Performed by: ORTHOPAEDIC SURGERY

## 2022-09-27 PROCEDURE — 3044F HG A1C LEVEL LT 7.0%: CPT | Mod: CPTII,,, | Performed by: ORTHOPAEDIC SURGERY

## 2022-09-27 PROCEDURE — 4010F ACE/ARB THERAPY RXD/TAKEN: CPT | Mod: CPTII,,, | Performed by: ORTHOPAEDIC SURGERY

## 2022-09-27 PROCEDURE — 99999 PR PBB SHADOW E&M-EST. PATIENT-LVL II: CPT | Mod: PBBFAC,,, | Performed by: ORTHOPAEDIC SURGERY

## 2022-09-27 PROCEDURE — 3008F PR BODY MASS INDEX (BMI) DOCUMENTED: ICD-10-PCS | Mod: CPTII,,, | Performed by: ORTHOPAEDIC SURGERY

## 2022-09-27 RX ORDER — OXYCODONE AND ACETAMINOPHEN 7.5; 325 MG/1; MG/1
1 TABLET ORAL EVERY 4 HOURS PRN
Qty: 28 TABLET | Refills: 0 | Status: SHIPPED | OUTPATIENT
Start: 2022-09-27 | End: 2023-02-17

## 2022-09-27 NOTE — PROGRESS NOTES
Patient ID: Nabil Urias is a 37 y.o. male    Chief Complaint:   Chief Complaint   Patient presents with    Left Wrist - Injury, Pain     DOI 22, ED visit        History of Present Illness:    This is a pleasant 37 y.o. male who presents for evaluation of left wrist pain. He reports an injury 2-3 days ago while falling onto an outstretched hand while at football practice. He  had immediate pain and difficulty bearing weight on that extremity. He presented to outside ER the following day and was diagnosed with a distal radius fracture. He was placed in a splint and referred to us for orthopedic evaluation. He is independent with activities of daily living at baseline. He walks without an assistive device.     Diabetic: No  Smoker: No  Hx of DVT, PE: No    Hand dominance:  Right    Occupation:         PAST MEDICAL HISTORY:   Past Medical History:   Diagnosis Date    Atrial fibrillation     Essential (primary) hypertension     History of cardiac radiofrequency ablation     Hyperlipidemia      PAST SURGICAL HISTORY:   Past Surgical History:   Procedure Laterality Date    ABLATION OF ARRHYTHMOGENIC FOCUS FOR ATRIAL FIBRILLATION N/A 2021    Procedure: ABLATION, ARRHYTHMOGENIC FOCUS, FOR ATRIAL FIBRILLATION;  Surgeon: Daniel Handy MD;  Location: [facility] EP LAB;  Service: Cardiology;  Laterality: N/A;  AF, PVI, GABRIELA (Cx if SR), PVI, RFA, CARTO, GEN, GP, 3 PREP    CARDIOVERSION      TONSILLECTOMY       FAMILY HISTORY:   Family History   Problem Relation Age of Onset    Heart attack Maternal Grandfather      SOCIAL HISTORY:   Social History     Occupational History    Not on file   Tobacco Use    Smoking status: Former     Packs/day: 1.00     Years: 5.00     Pack years: 5.00     Types: Cigarettes     Start date:      Quit date: 2015     Years since quittin.0    Smokeless tobacco: Current     Types: Chew    Tobacco comments:     1/2 can chew qd   Substance and Sexual Activity     Alcohol use: Yes     Alcohol/week: 4.0 standard drinks     Types: 4 Cans of beer per week     Comment: rarely    Drug use: Yes     Frequency: 1.0 times per week     Types: Marijuana     Comment: Hx: Heroin (last use April 2012) smokes weed ocassionally     Sexual activity: Yes     Partners: Female        MEDICATIONS:   Current Outpatient Medications:     apixaban (ELIQUIS) 5 mg Tab, Take 1 tablet (5 mg total) by mouth 2 (two) times daily., Disp: 60 tablet, Rfl: 6    aspirin (ECOTRIN) 81 MG EC tablet, Take 1 tablet (81 mg total) by mouth once daily., Disp: 180 tablet, Rfl: 3    FLUoxetine 40 MG capsule, Take 1 capsule (40 mg total) by mouth once daily., Disp: 90 capsule, Rfl: 1    losartan (COZAAR) 50 MG tablet, Take 1 tablet (50 mg total) by mouth once daily., Disp: 30 tablet, Rfl: 1    magnesium oxide (MAG-OX) 400 mg (241.3 mg magnesium) tablet, Take 400 mg by mouth once daily., Disp: , Rfl:     oxyCODONE-acetaminophen (PERCOCET) 7.5-325 mg per tablet, TAKE 1 TABLET BY MOUTH EVERY 4 HOURS AS NEEDED FOR PAIN, Disp: 15 tablet, Rfl: 0    hydrocodone-ibuprofen 7.5-200 mg (VICOPROFEN) 7.5-200 mg per tablet, Take 1 tablet by mouth every 8 (eight) hours as needed for Pain. (Patient not taking: Reported on 9/27/2022), Disp: 15 tablet, Rfl: 0  ALLERGIES: Review of patient's allergies indicates:  No Known Allergies      Physical Exam     Vitals:    09/27/22 1034   Resp: 18     Alert and oriented to person, place and time. No acute distress. Well-groomed, not ill appearing. Pupils round and reactive, normal respiratory effort, no audible wheezing.     On exam he is in a left short arm splint.  He has mild swelling of the left hand.  He has tenderness to palpation over the distal radius.  No obvious deformity or evidence of overt fracture.  AI and PI and ulnar nerve tested in isolation without deficit.  Compartments are soft compressible.  Warm well-perfused extremity        Imaging:     3 view  left wrist X-rays  ordered/reviewed by me showing intra-articular fracture of the distal radius with minimal displacement.  There is displacement of the lunate facet.        Assessment & Plan    Other closed intra-articular fracture of distal end of left radius, initial encounter         Treatment options were discussed with him in detail.  On x-ray he has left intra-articular distal radius fracture which does not seem to be significantly displaced.  However it is intra-articular and extending radially through the styloid which could make this on [place] unstable.  My recommendation is for CT scan of the left wrist with 3D reconstruction to evaluate the intra-articular displacement as well as the need for surgery and preoperative planning.  He will follow up on Friday to discuss results and we will discuss surgery at that time if necessary.    Follow up:  Friday  X-rays next visit:  None

## 2022-09-28 ENCOUNTER — HOSPITAL ENCOUNTER (OUTPATIENT)
Dept: RADIOLOGY | Facility: HOSPITAL | Age: 37
Discharge: HOME | End: 2022-09-28
Attending: ORTHOPAEDIC SURGERY
Payer: MEDICAID

## 2022-09-28 DIAGNOSIS — S52.572A OTHER CLOSED INTRA-ARTICULAR FRACTURE OF DISTAL END OF LEFT RADIUS, INITIAL ENCOUNTER: ICD-10-CM

## 2022-09-28 PROCEDURE — 73200 CT WRIST WITHOUT CONTRAST LEFT: ICD-10-PCS | Mod: 26,LT,, | Performed by: RADIOLOGY

## 2022-09-28 PROCEDURE — 73200 CT UPPER EXTREMITY W/O DYE: CPT | Mod: 26,LT,, | Performed by: RADIOLOGY

## 2022-09-28 PROCEDURE — 73200 CT UPPER EXTREMITY W/O DYE: CPT | Mod: TC,LT

## 2022-09-30 ENCOUNTER — OFFICE VISIT (OUTPATIENT)
Dept: ORTHOPEDICS | Facility: CLINIC | Age: 37
End: 2022-09-30
Payer: MEDICAID

## 2022-09-30 VITALS — HEIGHT: 72 IN | RESPIRATION RATE: 18 BRPM | WEIGHT: 260 LBS | BODY MASS INDEX: 35.21 KG/M2

## 2022-09-30 DIAGNOSIS — S52.572A OTHER CLOSED INTRA-ARTICULAR FRACTURE OF DISTAL END OF LEFT RADIUS, INITIAL ENCOUNTER: Primary | ICD-10-CM

## 2022-09-30 PROCEDURE — 3044F PR MOST RECENT HEMOGLOBIN A1C LEVEL <7.0%: ICD-10-PCS | Mod: CPTII,,, | Performed by: ORTHOPAEDIC SURGERY

## 2022-09-30 PROCEDURE — 99213 OFFICE O/P EST LOW 20 MIN: CPT | Mod: S$PBB,,, | Performed by: ORTHOPAEDIC SURGERY

## 2022-09-30 PROCEDURE — 3008F BODY MASS INDEX DOCD: CPT | Mod: CPTII,,, | Performed by: ORTHOPAEDIC SURGERY

## 2022-09-30 PROCEDURE — 99999 PR PBB SHADOW E&M-EST. PATIENT-LVL III: CPT | Mod: PBBFAC,,, | Performed by: ORTHOPAEDIC SURGERY

## 2022-09-30 PROCEDURE — 1159F MED LIST DOCD IN RCRD: CPT | Mod: CPTII,,, | Performed by: ORTHOPAEDIC SURGERY

## 2022-09-30 PROCEDURE — 1159F PR MEDICATION LIST DOCUMENTED IN MEDICAL RECORD: ICD-10-PCS | Mod: CPTII,,, | Performed by: ORTHOPAEDIC SURGERY

## 2022-09-30 PROCEDURE — 99213 OFFICE O/P EST LOW 20 MIN: CPT | Mod: PBBFAC,PN | Performed by: ORTHOPAEDIC SURGERY

## 2022-09-30 PROCEDURE — 99999 PR PBB SHADOW E&M-EST. PATIENT-LVL III: ICD-10-PCS | Mod: PBBFAC,,, | Performed by: ORTHOPAEDIC SURGERY

## 2022-09-30 PROCEDURE — 3044F HG A1C LEVEL LT 7.0%: CPT | Mod: CPTII,,, | Performed by: ORTHOPAEDIC SURGERY

## 2022-09-30 PROCEDURE — 99213 PR OFFICE/OUTPT VISIT, EST, LEVL III, 20-29 MIN: ICD-10-PCS | Mod: S$PBB,,, | Performed by: ORTHOPAEDIC SURGERY

## 2022-09-30 PROCEDURE — 4010F PR ACE/ARB THEARPY RXD/TAKEN: ICD-10-PCS | Mod: CPTII,,, | Performed by: ORTHOPAEDIC SURGERY

## 2022-09-30 PROCEDURE — 4010F ACE/ARB THERAPY RXD/TAKEN: CPT | Mod: CPTII,,, | Performed by: ORTHOPAEDIC SURGERY

## 2022-09-30 PROCEDURE — 3008F PR BODY MASS INDEX (BMI) DOCUMENTED: ICD-10-PCS | Mod: CPTII,,, | Performed by: ORTHOPAEDIC SURGERY

## 2022-09-30 NOTE — PROGRESS NOTES
Patient ID: Nabil Urias is a 37 y.o. male    Chief Complaint:   Chief Complaint   Patient presents with    Left Wrist - Pain       History of Present Illness:    This is a pleasant 37 y.o. male who presents for evaluation of left wrist pain. He reports an injury 2-3 days ago while falling onto an outstretched hand while at football practice. He  had immediate pain and difficulty bearing weight on that extremity. He presented to outside ER the following day and was diagnosed with a distal radius fracture. He was placed in a splint and referred to us for orthopedic evaluation. He is independent with activities of daily living at baseline. He walks without an assistive device.     Diabetic: No  Smoker: No  Hx of DVT, PE: No    Hand dominance:  Right    Occupation:       ____________________________________________________________________    Interval history 09/30/2022 : Patient returns today for CT follow-up of their left radius.  They report no changes since I saw them last.  Compliant with nonweightbearing in a splint        PAST MEDICAL HISTORY:   Past Medical History:   Diagnosis Date    Atrial fibrillation     Essential (primary) hypertension     History of cardiac radiofrequency ablation     Hyperlipidemia      PAST SURGICAL HISTORY:   Past Surgical History:   Procedure Laterality Date    ABLATION OF ARRHYTHMOGENIC FOCUS FOR ATRIAL FIBRILLATION N/A 1/21/2021    Procedure: ABLATION, ARRHYTHMOGENIC FOCUS, FOR ATRIAL FIBRILLATION;  Surgeon: Daniel Handy MD;  Location: [facility];  Service: Cardiology;  Laterality: N/A;  AF, PVI, GABRIELA (Cx if SR), PVI, RFA, CARTO, GEN, GP, 3 PREP    CARDIOVERSION      TONSILLECTOMY  1990     FAMILY HISTORY:   Family History   Problem Relation Age of Onset    Heart attack Maternal Grandfather      SOCIAL HISTORY:   Social History     Occupational History    Not on file   Tobacco Use    Smoking status: Former     Packs/day: 1.00     Years: 5.00     Pack years: 5.00      Types: Cigarettes     Start date:      Quit date: 2015     Years since quittin.0    Smokeless tobacco: Current     Types: Chew    Tobacco comments:     1/2 can chew qd   Substance and Sexual Activity    Alcohol use: Yes     Alcohol/week: 4.0 standard drinks     Types: 4 Cans of beer per week     Comment: rarely    Drug use: Yes     Frequency: 1.0 times per week     Types: Marijuana     Comment: Hx: Heroin (last use 2012) smokes weed ocassionally     Sexual activity: Yes     Partners: Female        MEDICATIONS:   Current Outpatient Medications:     apixaban (ELIQUIS) 5 mg Tab, Take 1 tablet (5 mg total) by mouth 2 (two) times daily., Disp: 60 tablet, Rfl: 6    aspirin (ECOTRIN) 81 MG EC tablet, Take 1 tablet (81 mg total) by mouth once daily., Disp: 180 tablet, Rfl: 3    FLUoxetine 40 MG capsule, Take 1 capsule (40 mg total) by mouth once daily., Disp: 90 capsule, Rfl: 1    hydrocodone-ibuprofen 7.5-200 mg (VICOPROFEN) 7.5-200 mg per tablet, Take 1 tablet by mouth every 8 (eight) hours as needed for Pain. (Patient not taking: Reported on 2022), Disp: 15 tablet, Rfl: 0    losartan (COZAAR) 50 MG tablet, Take 1 tablet (50 mg total) by mouth once daily., Disp: 30 tablet, Rfl: 1    magnesium oxide (MAG-OX) 400 mg (241.3 mg magnesium) tablet, Take 400 mg by mouth once daily., Disp: , Rfl:     oxyCODONE-acetaminophen (PERCOCET) 7.5-325 mg per tablet, Take 1 tablet by mouth every 4 (four) hours as needed for Pain., Disp: 28 tablet, Rfl: 0  ALLERGIES: Review of patient's allergies indicates:  No Known Allergies      Physical Exam     Vitals:    22 1023   Resp: 18     Alert and oriented to person, place and time. No acute distress. Well-groomed, not ill appearing. Pupils round and reactive, normal respiratory effort, no audible wheezing.     On exam he is in a left short arm splint.  He has mild swelling of the left hand.  He has tenderness to palpation over the distal radius.  No obvious  deformity or evidence of overt fracture.  AI and PI and ulnar nerve tested in isolation without deficit.  Compartments are soft compressible.  Warm well-perfused extremity        Imaging:     3 view  left wrist X-rays ordered/reviewed by me showing intra-articular fracture of the distal radius with minimal displacement.  There is displacement of the lunate facet.    CT left wrist reviewed by me showing no significant joint step-off.  There is fracture which is intra-articular extending from the DRUJ and exiting through the radial metaphysis.  Overall architecture and alignment is maintained        Assessment & Plan    Other closed intra-articular fracture of distal end of left radius, initial encounter       Treatment options were discussed with him in detail.  We went over his x-ray and CT scans.  I do think this could be treated non operatively with close follow-up with x-rays.  His overall joint looks good.  The fracture appears nondisplaced on CT scan.  We did discussed options including casting versus bracing.  My recommendation was a cast with transition to a removable fracture brace however he really does not want a cast today.  We will see him back in 2-3 weeks we will get x-rays of the left wrist at that time    Follow up:  2-3 weeks  X-rays next visit:  Left wrist

## 2022-10-07 DIAGNOSIS — S52.572A OTHER CLOSED INTRA-ARTICULAR FRACTURE OF DISTAL END OF LEFT RADIUS, INITIAL ENCOUNTER: Primary | ICD-10-CM

## 2023-02-17 ENCOUNTER — OFFICE VISIT (OUTPATIENT)
Dept: FAMILY MEDICINE | Facility: CLINIC | Age: 38
End: 2023-02-17
Payer: MEDICAID

## 2023-02-17 VITALS
RESPIRATION RATE: 18 BRPM | OXYGEN SATURATION: 98 % | SYSTOLIC BLOOD PRESSURE: 155 MMHG | WEIGHT: 275.13 LBS | HEART RATE: 108 BPM | HEIGHT: 72 IN | BODY MASS INDEX: 37.27 KG/M2 | DIASTOLIC BLOOD PRESSURE: 84 MMHG | TEMPERATURE: 98 F

## 2023-02-17 DIAGNOSIS — F41.9 ANXIETY: ICD-10-CM

## 2023-02-17 DIAGNOSIS — I10 PRIMARY HYPERTENSION: Primary | ICD-10-CM

## 2023-02-17 DIAGNOSIS — G47.00 INSOMNIA, UNSPECIFIED TYPE: ICD-10-CM

## 2023-02-17 DIAGNOSIS — E78.2 MIXED HYPERLIPIDEMIA: ICD-10-CM

## 2023-02-17 DIAGNOSIS — I48.0 PAROXYSMAL ATRIAL FIBRILLATION: ICD-10-CM

## 2023-02-17 DIAGNOSIS — I82.5Y3 CHRONIC DEEP VEIN THROMBOSIS (DVT) OF PROXIMAL VEIN OF BOTH LOWER EXTREMITIES: ICD-10-CM

## 2023-02-17 DIAGNOSIS — E66.01 CLASS 2 SEVERE OBESITY DUE TO EXCESS CALORIES WITH SERIOUS COMORBIDITY AND BODY MASS INDEX (BMI) OF 37.0 TO 37.9 IN ADULT: ICD-10-CM

## 2023-02-17 DIAGNOSIS — R73.01 IMPAIRED FASTING GLUCOSE: ICD-10-CM

## 2023-02-17 PROCEDURE — 99214 OFFICE O/P EST MOD 30 MIN: CPT | Mod: S$PBB,,, | Performed by: NURSE PRACTITIONER

## 2023-02-17 PROCEDURE — 4010F ACE/ARB THERAPY RXD/TAKEN: CPT | Mod: CPTII,,, | Performed by: NURSE PRACTITIONER

## 2023-02-17 PROCEDURE — 3079F PR MOST RECENT DIASTOLIC BLOOD PRESSURE 80-89 MM HG: ICD-10-PCS | Mod: CPTII,,, | Performed by: NURSE PRACTITIONER

## 2023-02-17 PROCEDURE — 3044F PR MOST RECENT HEMOGLOBIN A1C LEVEL <7.0%: ICD-10-PCS | Mod: CPTII,,, | Performed by: NURSE PRACTITIONER

## 2023-02-17 PROCEDURE — 3079F DIAST BP 80-89 MM HG: CPT | Mod: CPTII,,, | Performed by: NURSE PRACTITIONER

## 2023-02-17 PROCEDURE — 3008F BODY MASS INDEX DOCD: CPT | Mod: CPTII,,, | Performed by: NURSE PRACTITIONER

## 2023-02-17 PROCEDURE — 3008F PR BODY MASS INDEX (BMI) DOCUMENTED: ICD-10-PCS | Mod: CPTII,,, | Performed by: NURSE PRACTITIONER

## 2023-02-17 PROCEDURE — 99214 OFFICE O/P EST MOD 30 MIN: CPT | Performed by: NURSE PRACTITIONER

## 2023-02-17 PROCEDURE — 3044F HG A1C LEVEL LT 7.0%: CPT | Mod: CPTII,,, | Performed by: NURSE PRACTITIONER

## 2023-02-17 PROCEDURE — 1159F PR MEDICATION LIST DOCUMENTED IN MEDICAL RECORD: ICD-10-PCS | Mod: CPTII,,, | Performed by: NURSE PRACTITIONER

## 2023-02-17 PROCEDURE — 4010F PR ACE/ARB THEARPY RXD/TAKEN: ICD-10-PCS | Mod: CPTII,,, | Performed by: NURSE PRACTITIONER

## 2023-02-17 PROCEDURE — 1159F MED LIST DOCD IN RCRD: CPT | Mod: CPTII,,, | Performed by: NURSE PRACTITIONER

## 2023-02-17 PROCEDURE — 3077F SYST BP >= 140 MM HG: CPT | Mod: CPTII,,, | Performed by: NURSE PRACTITIONER

## 2023-02-17 PROCEDURE — 1160F RVW MEDS BY RX/DR IN RCRD: CPT | Mod: CPTII,,, | Performed by: NURSE PRACTITIONER

## 2023-02-17 PROCEDURE — 1160F PR REVIEW ALL MEDS BY PRESCRIBER/CLIN PHARMACIST DOCUMENTED: ICD-10-PCS | Mod: CPTII,,, | Performed by: NURSE PRACTITIONER

## 2023-02-17 PROCEDURE — 3077F PR MOST RECENT SYSTOLIC BLOOD PRESSURE >= 140 MM HG: ICD-10-PCS | Mod: CPTII,,, | Performed by: NURSE PRACTITIONER

## 2023-02-17 PROCEDURE — 99214 PR OFFICE/OUTPT VISIT, EST, LEVL IV, 30-39 MIN: ICD-10-PCS | Mod: S$PBB,,, | Performed by: NURSE PRACTITIONER

## 2023-02-17 RX ORDER — HYDROXYZINE HYDROCHLORIDE 25 MG/1
25 TABLET, FILM COATED ORAL 3 TIMES DAILY PRN
Qty: 90 TABLET | Refills: 2 | Status: SHIPPED | OUTPATIENT
Start: 2023-02-17 | End: 2023-03-02

## 2023-02-17 RX ORDER — VALSARTAN 80 MG/1
80 TABLET ORAL DAILY
Qty: 30 TABLET | Refills: 2 | Status: SHIPPED | OUTPATIENT
Start: 2023-02-17 | End: 2023-03-03 | Stop reason: SDUPTHER

## 2023-02-17 NOTE — PROGRESS NOTES
Subjective:       Patient ID: Nabil Urias is a 37 y.o. male.    Chief Complaint: Hypertension, Dizziness, and Blurred Vision    Patient presents today following up for hypertension, fatigue, dizziness, headache, and history of DVT and PE.  Patient has been noncompliant and has not followed up with Cardiology in the recent past.  Patient has hypertension in his not taking his medication currently.  Patient is also voicing concern over insomnia.  Patient states that he can not sleep well and is asking for a medication to help with management of this problem.  Patient is obese with a BMI of 37.31    Dizziness:   Chronicity:  New and recurrent  Onset:  More than 1 month ago  Progression since onset:  Resolved, then recurrent  Frequency:  Every few days  Severity:  Moderate  Dizziness characteristics:  Off-balance, sensation of movement and trouble focusing eyes   Associated symptoms: headaches, visual disturbances and chest pain.no hearing loss, no ear congestion, no ear pain, no fever, no tinnitus, no nausea, no vomiting, no diaphoresis, no aural fullness, no weakness, no light-headedness, no syncope, no palpitations, no panic, no facial weakness, no slurred speech and no numbness in extremities.  Aggravated by:  Exertion  Treatments tried:  Rest  Improvements on treatment:  Mild   PMH includes: anxiety.no head trauma, no head trauma and no ear infections.  Hypertension  This is a chronic problem. The current episode started more than 1 month ago. The problem has been waxing and waning since onset. The problem is uncontrolled. Associated symptoms include anxiety, blurred vision, chest pain, headaches and malaise/fatigue. Pertinent negatives include no neck pain, orthopnea, palpitations, peripheral edema, PND, shortness of breath or sweats. Risk factors for coronary artery disease include sedentary lifestyle, male gender, obesity, dyslipidemia and stress. Past treatments include lifestyle changes. The current  treatment provides no improvement. Compliance problems include exercise and diet.  There is no history of angina. There is no history of chronic renal disease.   Fatigue  This is a recurrent problem. The current episode started more than 1 month ago. The problem occurs daily. The problem has been waxing and waning. Associated symptoms include chest pain, fatigue and headaches. Pertinent negatives include no abdominal pain, arthralgias, chills, congestion, coughing, diaphoresis, fever, myalgias, nausea, neck pain, rash, sore throat, vomiting or weakness. The symptoms are aggravated by stress and exertion. He has tried lying down, rest, position changes and relaxation for the symptoms. The treatment provided mild relief.   Review of Systems   Constitutional:  Positive for activity change, fatigue and malaise/fatigue. Negative for appetite change, chills, diaphoresis, fever and unexpected weight change.        Obesity   HENT:  Negative for congestion, ear discharge, ear pain, hearing loss, sore throat, tinnitus, trouble swallowing and voice change.    Eyes:  Positive for blurred vision. Negative for photophobia, pain and visual disturbance.   Respiratory:  Negative for cough, chest tightness and shortness of breath.    Cardiovascular:  Positive for chest pain. Negative for palpitations, orthopnea, leg swelling, syncope and PND.        Facial flushing   Gastrointestinal:  Negative for abdominal pain, constipation, diarrhea, nausea and vomiting.   Endocrine: Negative for cold intolerance and heat intolerance.   Genitourinary:  Negative for difficulty urinating, dysuria and flank pain.   Musculoskeletal:  Negative for arthralgias, gait problem, myalgias and neck pain.   Skin:  Negative for rash.   Allergic/Immunologic: Negative for immunocompromised state.   Neurological:  Positive for dizziness and headaches. Negative for weakness and light-headedness.   Hematological:  Negative for adenopathy.   Psychiatric/Behavioral:   Negative for agitation, confusion, self-injury and suicidal ideas. The patient is nervous/anxious.      Past Medical History:   Diagnosis Date    Atrial fibrillation     Essential (primary) hypertension     History of cardiac radiofrequency ablation     Hyperlipidemia       Past Surgical History:   Procedure Laterality Date    ABLATION OF ARRHYTHMOGENIC FOCUS FOR ATRIAL FIBRILLATION N/A 2021    Procedure: ABLATION, ARRHYTHMOGENIC FOCUS, FOR ATRIAL FIBRILLATION;  Surgeon: Daniel Handy MD;  Location: [facility];  Service: Cardiology;  Laterality: N/A;  AF, PVI, GABRIELA (Cx if SR), PVI, RFA, CARTO, GEN, GP, 3 PREP    CARDIOVERSION      TONSILLECTOMY         Family History   Problem Relation Age of Onset    Heart attack Maternal Grandfather        Social History     Socioeconomic History    Marital status:    Tobacco Use    Smoking status: Former     Packs/day: 1.00     Years: 5.00     Pack years: 5.00     Types: Cigarettes     Start date:      Quit date: 2015     Years since quittin.4    Smokeless tobacco: Current     Types: Chew    Tobacco comments:     1/2 can chew qd   Substance and Sexual Activity    Alcohol use: Yes     Alcohol/week: 4.0 standard drinks     Types: 4 Cans of beer per week     Comment: rarely    Drug use: Yes     Frequency: 1.0 times per week     Types: Marijuana     Comment: Hx: Heroin (last use 2012) smokes weed ocassionally     Sexual activity: Yes     Partners: Female   Social History Narrative    ** Merged History Encounter **            Current Outpatient Medications   Medication Sig Dispense Refill    apixaban (ELIQUIS) 5 mg Tab Take 1 tablet (5 mg total) by mouth 2 (two) times daily. 60 tablet 6    hydrOXYzine HCL (ATARAX) 25 MG tablet Take 1 tablet (25 mg total) by mouth 3 (three) times daily as needed for Itching. 90 tablet 2    magnesium oxide (MAG-OX) 400 mg (241.3 mg magnesium) tablet Take 400 mg by mouth once daily.      valsartan (DIOVAN) 80 MG  tablet Take 1 tablet (80 mg total) by mouth once daily. 30 tablet 2     No current facility-administered medications for this visit.       Review of patient's allergies indicates:  No Known Allergies  Objective:      Blood pressure (!) 155/84, pulse 108, temperature 97.6 °F (36.4 °C), resp. rate 18, height 6' (1.829 m), weight 124.8 kg (275 lb 1.6 oz), SpO2 98 %. Body mass index is 37.31 kg/m².   Physical Exam  Vitals and nursing note reviewed.   Constitutional:       General: He is not in acute distress.     Appearance: Normal appearance. He is well-developed. He is obese. He is not ill-appearing.   HENT:      Head: Normocephalic and atraumatic.      Right Ear: Tympanic membrane, ear canal and external ear normal.      Left Ear: Tympanic membrane, ear canal and external ear normal.      Nose: Nose normal.      Mouth/Throat:      Mouth: Mucous membranes are moist.      Pharynx: Uvula midline.   Eyes:      General: Lids are normal.      Extraocular Movements: Extraocular movements intact.      Conjunctiva/sclera: Conjunctivae normal.      Pupils: Pupils are equal, round, and reactive to light.   Cardiovascular:      Rate and Rhythm: Normal rate and regular rhythm.      Pulses: Normal pulses.      Heart sounds: Normal heart sounds, S1 normal and S2 normal. No murmur heard.  Pulmonary:      Effort: Pulmonary effort is normal. No respiratory distress.      Breath sounds: Normal breath sounds. No wheezing.   Abdominal:      General: Bowel sounds are normal.      Palpations: Abdomen is soft.      Tenderness: There is no abdominal tenderness.   Musculoskeletal:         General: Normal range of motion.      Cervical back: Normal range of motion and neck supple.   Lymphadenopathy:      Cervical: No cervical adenopathy.   Skin:     General: Skin is warm and dry.      Findings: No rash.   Neurological:      General: No focal deficit present.      Mental Status: He is alert and oriented to person, place, and time. Mental status  is at baseline.   Psychiatric:         Mood and Affect: Mood normal.         Speech: Speech normal.         Behavior: Behavior normal.           Assessment:       1. Primary hypertension    2. Insomnia, unspecified type    3. Anxiety    4. Impaired fasting glucose    5. Mixed hyperlipidemia    6. Chronic deep vein thrombosis (DVT) of proximal vein of both lower extremities    7. Paroxysmal atrial fibrillation    8. Class 2 severe obesity due to excess calories with serious comorbidity and body mass index (BMI) of 37.0 to 37.9 in adult        Plan:       Nabil was seen today for hypertension, dizziness and blurred vision.    Diagnoses and all orders for this visit:    Primary hypertension  -     valsartan (DIOVAN) 80 MG tablet; Take 1 tablet (80 mg total) by mouth once daily.  -     CBC Auto Differential; Future  -     Comprehensive Metabolic Panel; Future  -     Urinalysis; Future    Lifestyle changes: Reduce the amount of salt in your diet; Lose weight; Avoid drinking too much alcohol; Exercise at least 30 minutes per day most days of the week.  Continue current medications and home BP monitoring.      Insomnia, unspecified type  -     hydrOXYzine HCL (ATARAX) 25 MG tablet; Take 1 tablet (25 mg total) by mouth 3 (three) times daily as needed for Itching.    Anxiety  -     hydrOXYzine HCL (ATARAX) 25 MG tablet; Take 1 tablet (25 mg total) by mouth 3 (three) times daily as needed for Itching.    Impaired fasting glucose  -     Hemoglobin A1C; Future    Mixed hyperlipidemia  -     Lipid Panel; Future  Limit red meat, butter, fried foods, cheese, and other foods that have a lot of saturated fat. Consume more: lean meats, fish, fruits, vegetables, whole grains, beans, lentils, and nuts.  Weight loss, and 30-45 min of cardiovascular exercise daily.      Chronic deep vein thrombosis (DVT) of proximal vein of both lower extremities  Highly encouraged follow-up and continuation of anticoagulant per cardiology's  recommendation.    Paroxysmal atrial fibrillation  Highly encouraged follow-up and continuation of anticoagulant per cardiology's recommendation.    Class 2 severe obesity due to excess calories with serious comorbidity and body mass index (BMI) of 37.0 to 37.9 in adult  The patient is asked to make an attempt to improve diet and exercise patterns to aid in medical management of this problem.            Follow-up with cardiology ASAP.  Follow-up with me in 1 month for blood pressure

## 2023-02-23 ENCOUNTER — TELEPHONE (OUTPATIENT)
Dept: CARDIOLOGY | Facility: CLINIC | Age: 38
End: 2023-02-23
Payer: MEDICAID

## 2023-02-23 NOTE — TELEPHONE ENCOUNTER
----- Message from Kel Acosta sent at 2/23/2023  1:31 PM CST -----  Regarding: med question  Type: Needs Medical Advice    Who Called:  wife // tamir    Johnny Call Back Number: [phone]    Additional Information: pt has been off cardio meds for about 2m. Needs to know if needs to get back on before office visit please call to discuss.

## 2023-02-23 NOTE — TELEPHONE ENCOUNTER
----- Message from Kel Acosta sent at 2/23/2023  2:17 PM CST -----  Regarding: ret call  Type:  Patient Returning Call    Who Called:  wife // tamir    Who Left Message for Patient:  yes    Does the patient know what this is regarding?:  pt will not be able to make appt scheduled tomorrow b/c [place] and will not be [place] in time.     Best Call Back Number:  [phone]    Additional Information:

## 2023-02-28 ENCOUNTER — LAB VISIT (OUTPATIENT)
Dept: LAB | Facility: HOSPITAL | Age: 38
End: 2023-02-28
Attending: NURSE PRACTITIONER
Payer: MEDICAID

## 2023-02-28 DIAGNOSIS — R73.01 IMPAIRED FASTING GLUCOSE: ICD-10-CM

## 2023-02-28 DIAGNOSIS — E78.2 MIXED HYPERLIPIDEMIA: ICD-10-CM

## 2023-02-28 DIAGNOSIS — I10 PRIMARY HYPERTENSION: ICD-10-CM

## 2023-02-28 LAB
ALBUMIN SERPL BCP-MCNC: 4.5 G/DL (ref 3.5–5.2)
ALP SERPL-CCNC: 71 U/L (ref 55–135)
ALT SERPL W/O P-5'-P-CCNC: 40 U/L (ref 10–44)
ANION GAP SERPL CALC-SCNC: 8 MMOL/L (ref 8–16)
AST SERPL-CCNC: 28 U/L (ref 10–40)
BASOPHILS # BLD AUTO: 0.16 K/UL (ref 0–0.2)
BASOPHILS NFR BLD: 1.5 % (ref 0–1.9)
BILIRUB SERPL-MCNC: 1 MG/DL (ref 0.1–1)
BILIRUB UR QL STRIP: NEGATIVE
BUN SERPL-MCNC: 16 MG/DL (ref 6–20)
CALCIUM SERPL-MCNC: 9.6 MG/DL (ref 8.7–10.5)
CHLORIDE SERPL-SCNC: 104 MMOL/L (ref 95–110)
CHOLEST SERPL-MCNC: 218 MG/DL (ref 120–199)
CHOLEST/HDLC SERPL: 4.4 {RATIO} (ref 2–5)
CLARITY UR: CLEAR
CO2 SERPL-SCNC: 28 MMOL/L (ref 23–29)
COLOR UR: YELLOW
CREAT SERPL-MCNC: 1.2 MG/DL (ref 0.5–1.4)
DIFFERENTIAL METHOD: ABNORMAL
EOSINOPHIL # BLD AUTO: 0.5 K/UL (ref 0–0.5)
EOSINOPHIL NFR BLD: 4.8 % (ref 0–8)
ERYTHROCYTE [DISTWIDTH] IN BLOOD BY AUTOMATED COUNT: 12.3 % (ref 11.5–14.5)
EST. GFR  (NO RACE VARIABLE): >60 ML/MIN/1.73 M^2
ESTIMATED AVG GLUCOSE: 100 MG/DL (ref 68–131)
GLUCOSE SERPL-MCNC: 112 MG/DL (ref 70–110)
GLUCOSE UR QL STRIP: NEGATIVE
HBA1C MFR BLD: 5.1 % (ref 4.5–6.2)
HCT VFR BLD AUTO: 47.1 % (ref 40–54)
HDLC SERPL-MCNC: 49 MG/DL (ref 40–75)
HDLC SERPL: 22.5 % (ref 20–50)
HGB BLD-MCNC: 16.6 G/DL (ref 14–18)
HGB UR QL STRIP: NEGATIVE
IMM GRANULOCYTES # BLD AUTO: 0.11 K/UL (ref 0–0.04)
IMM GRANULOCYTES NFR BLD AUTO: 1 % (ref 0–0.5)
KETONES UR QL STRIP: NEGATIVE
LDLC SERPL CALC-MCNC: 128.8 MG/DL (ref 63–159)
LEUKOCYTE ESTERASE UR QL STRIP: NEGATIVE
LYMPHOCYTES # BLD AUTO: 3 K/UL (ref 1–4.8)
LYMPHOCYTES NFR BLD: 27.3 % (ref 18–48)
MCH RBC QN AUTO: 33.4 PG (ref 27–31)
MCHC RBC AUTO-ENTMCNC: 35.2 G/DL (ref 32–36)
MCV RBC AUTO: 95 FL (ref 82–98)
MONOCYTES # BLD AUTO: 0.7 K/UL (ref 0.3–1)
MONOCYTES NFR BLD: 6.6 % (ref 4–15)
NEUTROPHILS # BLD AUTO: 6.5 K/UL (ref 1.8–7.7)
NEUTROPHILS NFR BLD: 58.8 % (ref 38–73)
NITRITE UR QL STRIP: NEGATIVE
NONHDLC SERPL-MCNC: 169 MG/DL
NRBC BLD-RTO: 0 /100 WBC
PH UR STRIP: 6 [PH] (ref 5–8)
PLATELET # BLD AUTO: 278 K/UL (ref 150–450)
PMV BLD AUTO: 9.9 FL (ref 9.2–12.9)
POTASSIUM SERPL-SCNC: 4.1 MMOL/L (ref 3.5–5.1)
PROT SERPL-MCNC: 8 G/DL (ref 6–8.4)
PROT UR QL STRIP: NEGATIVE
RBC # BLD AUTO: 4.97 M/UL (ref 4.6–6.2)
SODIUM SERPL-SCNC: 140 MMOL/L (ref 136–145)
SP GR UR STRIP: 1.01 (ref 1–1.03)
TRIGL SERPL-MCNC: 201 MG/DL (ref 30–150)
URN SPEC COLLECT METH UR: NORMAL
UROBILINOGEN UR STRIP-ACNC: NEGATIVE EU/DL
WBC # BLD AUTO: 10.99 K/UL (ref 3.9–12.7)

## 2023-02-28 PROCEDURE — 36415 COLL VENOUS BLD VENIPUNCTURE: CPT | Performed by: NURSE PRACTITIONER

## 2023-02-28 PROCEDURE — 83036 HEMOGLOBIN GLYCOSYLATED A1C: CPT | Performed by: NURSE PRACTITIONER

## 2023-02-28 PROCEDURE — 80061 LIPID PANEL: CPT | Performed by: NURSE PRACTITIONER

## 2023-02-28 PROCEDURE — 81003 URINALYSIS AUTO W/O SCOPE: CPT | Performed by: NURSE PRACTITIONER

## 2023-02-28 PROCEDURE — 85025 COMPLETE CBC W/AUTO DIFF WBC: CPT | Performed by: NURSE PRACTITIONER

## 2023-02-28 PROCEDURE — 80053 COMPREHEN METABOLIC PANEL: CPT | Performed by: NURSE PRACTITIONER

## 2023-03-02 ENCOUNTER — OFFICE VISIT (OUTPATIENT)
Dept: CARDIOLOGY | Facility: CLINIC | Age: 38
End: 2023-03-02
Payer: MEDICAID

## 2023-03-02 VITALS
OXYGEN SATURATION: 97 % | SYSTOLIC BLOOD PRESSURE: 140 MMHG | HEART RATE: 110 BPM | WEIGHT: 275 LBS | DIASTOLIC BLOOD PRESSURE: 98 MMHG | HEIGHT: 72 IN | BODY MASS INDEX: 37.25 KG/M2

## 2023-03-02 DIAGNOSIS — Z79.01 CURRENT USE OF LONG TERM ANTICOAGULATION: ICD-10-CM

## 2023-03-02 DIAGNOSIS — G47.33 OSA (OBSTRUCTIVE SLEEP APNEA): ICD-10-CM

## 2023-03-02 DIAGNOSIS — I10 HYPERTENSION, UNSPECIFIED TYPE: ICD-10-CM

## 2023-03-02 DIAGNOSIS — E78.2 MIXED HYPERLIPIDEMIA: ICD-10-CM

## 2023-03-02 DIAGNOSIS — R07.9 CHEST PAIN, UNSPECIFIED TYPE: ICD-10-CM

## 2023-03-02 DIAGNOSIS — I26.99 OTHER ACUTE PULMONARY EMBOLISM WITHOUT ACUTE COR PULMONALE: ICD-10-CM

## 2023-03-02 DIAGNOSIS — Z00.00 ANNUAL PHYSICAL EXAM: Primary | ICD-10-CM

## 2023-03-02 DIAGNOSIS — R06.09 DYSPNEA ON EXERTION: ICD-10-CM

## 2023-03-02 PROCEDURE — 3044F HG A1C LEVEL LT 7.0%: CPT | Mod: CPTII,,,

## 2023-03-02 PROCEDURE — 1160F RVW MEDS BY RX/DR IN RCRD: CPT | Mod: CPTII,,,

## 2023-03-02 PROCEDURE — 4010F ACE/ARB THERAPY RXD/TAKEN: CPT | Mod: CPTII,,,

## 2023-03-02 PROCEDURE — 93010 EKG 12-LEAD: ICD-10-PCS | Mod: S$PBB,,, | Performed by: GENERAL PRACTICE

## 2023-03-02 PROCEDURE — 3080F PR MOST RECENT DIASTOLIC BLOOD PRESSURE >= 90 MM HG: ICD-10-PCS | Mod: CPTII,,,

## 2023-03-02 PROCEDURE — 3044F PR MOST RECENT HEMOGLOBIN A1C LEVEL <7.0%: ICD-10-PCS | Mod: CPTII,,,

## 2023-03-02 PROCEDURE — 3077F PR MOST RECENT SYSTOLIC BLOOD PRESSURE >= 140 MM HG: ICD-10-PCS | Mod: CPTII,,,

## 2023-03-02 PROCEDURE — 99213 OFFICE O/P EST LOW 20 MIN: CPT | Mod: PBBFAC,PN

## 2023-03-02 PROCEDURE — 99999 PR PBB SHADOW E&M-EST. PATIENT-LVL III: ICD-10-PCS | Mod: PBBFAC,,,

## 2023-03-02 PROCEDURE — 1160F PR REVIEW ALL MEDS BY PRESCRIBER/CLIN PHARMACIST DOCUMENTED: ICD-10-PCS | Mod: CPTII,,,

## 2023-03-02 PROCEDURE — 93005 ELECTROCARDIOGRAM TRACING: CPT | Mod: PBBFAC,PN | Performed by: GENERAL PRACTICE

## 2023-03-02 PROCEDURE — 99999 PR PBB SHADOW E&M-EST. PATIENT-LVL III: CPT | Mod: PBBFAC,,,

## 2023-03-02 PROCEDURE — 93010 ELECTROCARDIOGRAM REPORT: CPT | Mod: S$PBB,,, | Performed by: GENERAL PRACTICE

## 2023-03-02 PROCEDURE — 3008F BODY MASS INDEX DOCD: CPT | Mod: CPTII,,,

## 2023-03-02 PROCEDURE — 1159F PR MEDICATION LIST DOCUMENTED IN MEDICAL RECORD: ICD-10-PCS | Mod: CPTII,,,

## 2023-03-02 PROCEDURE — 3077F SYST BP >= 140 MM HG: CPT | Mod: CPTII,,,

## 2023-03-02 PROCEDURE — 4010F PR ACE/ARB THEARPY RXD/TAKEN: ICD-10-PCS | Mod: CPTII,,,

## 2023-03-02 PROCEDURE — 3080F DIAST BP >= 90 MM HG: CPT | Mod: CPTII,,,

## 2023-03-02 PROCEDURE — 99214 PR OFFICE/OUTPT VISIT, EST, LEVL IV, 30-39 MIN: ICD-10-PCS | Mod: S$PBB,,,

## 2023-03-02 PROCEDURE — 1159F MED LIST DOCD IN RCRD: CPT | Mod: CPTII,,,

## 2023-03-02 PROCEDURE — 3008F PR BODY MASS INDEX (BMI) DOCUMENTED: ICD-10-PCS | Mod: CPTII,,,

## 2023-03-02 PROCEDURE — 99214 OFFICE O/P EST MOD 30 MIN: CPT | Mod: S$PBB,,,

## 2023-03-02 RX ORDER — METOPROLOL SUCCINATE 25 MG/1
12.5 TABLET, EXTENDED RELEASE ORAL DAILY
Qty: 30 TABLET | Refills: 11 | Status: SHIPPED | OUTPATIENT
Start: 2023-03-02 | End: 2023-07-27

## 2023-03-02 NOTE — ASSESSMENT & PLAN NOTE
Patient had recent cholesterol 218 HDL 49, .8, triglycerides 201.  Patient does not wish to go on statin therapy at this time.  Strongly encouraged low-sodium heart healthy diet.  Reduce saturated fats.  Reduce meat intake and increased vegetables and fruit intake.

## 2023-03-02 NOTE — ASSESSMENT & PLAN NOTE
Patient admits to chest pain and dyspnea exertion at times.  Recommend obtaining nuclear stress test at this time.

## 2023-03-02 NOTE — ASSESSMENT & PLAN NOTE
Continue valsartan 160 mg daily.  Start metoprolol 12.5 mg daily.  If blood pressure remains greater than 140/80, take full-dose metoprolol 25 mg daily.  Continue to monitor heart rate and blood pressure and keep a log.

## 2023-03-02 NOTE — PROGRESS NOTES
Subjective:    Patient ID:  Nabil Urias is a 37 y.o. male patient here for evaluation Follow-up, Shortness of Breath, and Headache      History of Present Illness:     Patient is here for a checkup.  Patient states that he has stopped all of his medication over the past 6 months because he was feeling fatigued.  He went and saw his PCP recently who placed him back on valsartan 80 mg daily and was told to double the dose of his blood pressure was not under control.  Patient has been doubling his valsartan in his currently taking 160 mg daily.  Patient's blood pressure today in office is 140/98.  Patient's heart rate is 110.  His EKG shows sinus tachycardia with a left posterior fascicular block.  Patient states that he has been intermittent chest pain and dyspnea on exertion.  Patient states he has a history of COVID-19 and blood clots in bilateral lower legs as well as pulmonary embolism from COVID.  Patient also has history of atrial fibrillation with cardiac ablation.  Patient is not currently taking any Eliquis at this time.  Patient is also noncompliant with wearing his CPAP.  He was a  and has a very sedentary job.  Patient denies palpitations, lightheadedness, dizziness, edema or bleeding.  Patient does admit to anxiety and states that his blood pressure increases when he was anxious.  States that he is tried SSRIs, hydroxyzine, buspirone in the past for anxiety in all have been ineffective.  Patient does not wish at this time to try different SSRI.        Review of patient's allergies indicates:  No Known Allergies    Past Medical History:   Diagnosis Date    Atrial fibrillation     Essential (primary) hypertension     History of cardiac radiofrequency ablation     Hyperlipidemia      Past Surgical History:   Procedure Laterality Date    ABLATION OF ARRHYTHMOGENIC FOCUS FOR ATRIAL FIBRILLATION N/A 1/21/2021    Procedure: ABLATION, ARRHYTHMOGENIC FOCUS, FOR ATRIAL FIBRILLATION;  Surgeon: Daniel  ROBERT Handy MD;  Location: [facility] EP LAB;  Service: Cardiology;  Laterality: N/A;  AF, PVI, GABRIELA (Cx if SR), PVI, RFA, CARTO, GEN, GP, 3 PREP    CARDIOVERSION      TONSILLECTOMY       Social History     Tobacco Use    Smoking status: Former     Packs/day: 1.00     Years: 5.00     Pack years: 5.00     Types: Cigarettes     Start date:      Quit date: 2015     Years since quittin.4    Smokeless tobacco: Current     Types: Chew    Tobacco comments:     1/2 can chew qd   Substance Use Topics    Alcohol use: Yes     Alcohol/week: 4.0 standard drinks     Types: 4 Cans of beer per week     Comment: rarely    Drug use: Yes     Frequency: 1.0 times per week     Types: Marijuana     Comment: Hx: Heroin (last use 2012) smokes weed ocassionally         Review of Systems:    As noted in HPI in addition      REVIEW OF SYSTEMS  CARDIOVASCULAR:  Positive chest pain No recent, palpitations, arm, neck, or jaw pain  RESPIRATORY:  Positive dyspnea on exertion.  No recent fever, cough chills, SOB or congestion  : No blood in the urine  GI: No Nausea, vomiting, constipation, diarrhea, blood, or reflux.  MUSCULOSKELETAL: No myalgias  NEURO: No lightheadedness or dizziness  EYES: No Double vision, blurry, vision or headache              Objective        Vitals:    23 1417   BP: (!) 140/98   Pulse: 110       LIPIDS - LAST 2   Lab Results   Component Value Date    CHOL 218 (H) 2023    CHOL 245 (H) 2022    HDL 49 2023    HDL 41 2022    LDLCALC 128.8 2023    LDLCALC Invalid, Trig>400.0 2022    TRIG 201 (H) 2023    TRIG 415 (H) 2022    CHOLHDL 22.5 2023    CHOLHDL 16.7 (L) 2022       CBC - LAST 2  Lab Results   Component Value Date    WBC 10.99 2023    WBC 10.51 2021    RBC 4.97 2023    RBC 4.82 2021    HGB 16.6 2023    HGB 15.1 2021    HCT 47.1 2023    HCT 43.9 2021    MCV 95 2023    MCV 91 2021     MCH 33.4 (H) 02/28/2023    MCH 31.3 (H) 08/19/2021    MCHC 35.2 02/28/2023    MCHC 34.4 08/19/2021    RDW 12.3 02/28/2023    RDW 11.8 08/19/2021     02/28/2023     08/19/2021    MPV 9.9 02/28/2023    MPV 9.5 08/19/2021    GRAN 6.5 02/28/2023    GRAN 58.8 02/28/2023    LYMPH 3.0 02/28/2023    LYMPH 27.3 02/28/2023    MONO 0.7 02/28/2023    MONO 6.6 02/28/2023    BASO 0.16 02/28/2023    BASO 0.05 08/19/2021    NRBC 0 02/28/2023    NRBC 0 08/19/2021       CHEMISTRY & LIVER FUNCTION - LAST 2  Lab Results   Component Value Date     02/28/2023     01/11/2022    K 4.1 02/28/2023    K 4.1 01/11/2022     02/28/2023     01/11/2022    CO2 28 02/28/2023    CO2 29 01/11/2022    ANIONGAP 8 02/28/2023    ANIONGAP 9 01/11/2022    BUN 16 02/28/2023    BUN 17 01/11/2022    CREATININE 1.2 02/28/2023    CREATININE 1.3 01/11/2022     (H) 02/28/2023    GLU 86 01/11/2022    CALCIUM 9.6 02/28/2023    CALCIUM 9.6 01/11/2022    MG 1.9 08/19/2021    MG 1.9 11/08/2020    ALBUMIN 4.5 02/28/2023    ALBUMIN 4.7 01/11/2022    PROT 8.0 02/28/2023    PROT 7.8 01/11/2022    ALKPHOS 71 02/28/2023    ALKPHOS 67 01/11/2022    ALT 40 02/28/2023    ALT 35 01/11/2022    AST 28 02/28/2023    AST 24 01/11/2022    BILITOT 1.0 02/28/2023    BILITOT 1.5 (H) 01/11/2022        CARDIAC PROFILE - LAST 2  Lab Results   Component Value Date    BNP 47 08/19/2021    BNP 20 11/07/2020    BNP 20 11/07/2020    CPK 75 08/18/2021     08/18/2021    CPKMB 2.6 08/07/2020     08/18/2021    TROPONINI <0.030 08/19/2021    TROPONINI <0.030 11/08/2020        COAGULATION - LAST 2  Lab Results   Component Value Date    LABPT 12.6 11/07/2020    INR 1.0 01/12/2021    INR 1.0 11/07/2020    APTT 28.0 01/12/2021       ENDOCRINE & PSA - LAST 2  Lab Results   Component Value Date    HGBA1C 5.1 02/28/2023    HGBA1C 5.0 01/11/2022    TSH 1.790 11/07/2020    TSH 1.610 08/08/2020        ECHOCARDIOGRAM RESULTS  Results for orders placed  during the hospital encounter of 08/18/21    Echo    Interpretation Summary  · The estimated PA systolic pressure is 36 mmHg.  · Concentric remodeling and normal systolic function.  · The estimated ejection fraction is 65%.  · Normal left ventricular diastolic function.  · Normal right ventricular size with normal right ventricular systolic function.  · Mild mitral regurgitation.  · Mild tricuspid regurgitation.      CURRENT/PREVIOUS VISIT EKG  Results for orders placed or performed during the hospital encounter of 08/18/21   EKG 12-lead    Collection Time: 08/18/21 11:54 AM    Narrative    Test Reason : R07.9,    Vent. Rate : 081 BPM     Atrial Rate : 081 BPM     P-R Int : 114 ms          QRS Dur : 086 ms      QT Int : 358 ms       P-R-T Axes : 047 077 030 degrees     QTc Int : 415 ms    Normal sinus rhythm  Normal ECG  When compared with ECG of 23-APR-2021 14:27,  No significant change was found  Confirmed by Autumn GALLEGOS, Lexa RAMSAY ([id number]) on 8/20/2021 3:38:53 PM    Referred By: [email or web address]   SELF           Confirmed By:Lexa Leyva MD     No valid procedures specified.   No results found for this or any previous visit.    No valid procedures specified.    PHYSICAL EXAM  CONSTITUTIONAL: Well built, well nourished in no apparent distress  NECK: no carotid bruit, no JVD  LUNGS: CTA  CHEST WALL: no tenderness  HEART: regular rate and rhythm, S1, S2 normal, no murmur, click, rub or gallop   ABDOMEN: soft, non-tender; bowel sounds normal  EXTREMITIES: Extremities normal, no edema, no calf tenderness noted  NEURO: AAO X 3    I HAVE REVIEWED :    The vital signs, nurses notes, and all the pertinent radiology and labs.        Current Outpatient Medications   Medication Instructions    apixaban (ELIQUIS) 5 mg, Oral, 2 times daily    metoprolol succinate (TOPROL-XL) 12.5 mg, Oral, Daily    valsartan (DIOVAN) 80 mg, Oral, Daily          Assessment & Plan     Chest pain  Patient admits to chest pain and dyspnea on exertion.   Patient has been all of his medications for approximately 6 months.  Patient was restarted recently on valsartan 160 mg daily.    Recommend obtaining a stress test at this time for further evaluation of chest pain.  Strongly encourage patient to continue current medication regimen.    Started patient metoprolol 12.5 mg daily.  Patient to also start taking his Eliquis 5 mg b.i.d..  Chads Vasc score was 3 points which is moderate to high risk and should otherwise be in anticoagulation candidate.    Hyperlipidemia  Patient had recent cholesterol 218 HDL 49, .8, triglycerides 201.  Patient does not wish to go on statin therapy at this time.  Strongly encouraged low-sodium heart healthy diet.  Reduce saturated fats.  Reduce meat intake and increased vegetables and fruit intake.      Other pulmonary embolism without acute cor pulmonale  Patient has history of DVTs and PE status post COVID.  Patient also has history of atrial fibrillation with cardiac ablation.  Patient is in sinus tachycardia today during examination.  Chads Vasc score is 3 points.  Unknown if there is an underlying clotting disorder.  Strongly recommend restarting Eliquis 5 mg b.i.d..    RAMIRO (obstructive sleep apnea)  Strongly encouraged patient to obtain a new CPAP and wear it nightly.  Patient states that he is not going to wear CPAP at this time.  Patient verbalizes understanding that this can exacerbate atrial fibrillation and worsen his heart function as well as increase his fatigue.    Hypertension  Continue valsartan 160 mg daily.  Start metoprolol 12.5 mg daily.  If blood pressure remains greater than 140/80, take full-dose metoprolol 25 mg daily.  Continue to monitor heart rate and blood pressure and keep a log.    Dyspnea on exertion  Patient admits to chest pain and dyspnea exertion at times.  Recommend obtaining nuclear stress test at this time.          No follow-ups on file.

## 2023-03-02 NOTE — ASSESSMENT & PLAN NOTE
Patient admits to chest pain and dyspnea on exertion.  Patient has been all of his medications for approximately 6 months.  Patient was restarted recently on valsartan 160 mg daily.    Recommend obtaining a stress test at this time for further evaluation of chest pain.  Strongly encourage patient to continue current medication regimen.    Started patient metoprolol 12.5 mg daily.  Patient to also start taking his Eliquis 5 mg b.i.d..  Chads Vasc score was 3 points which is moderate to high risk and should otherwise be in anticoagulation candidate.

## 2023-03-02 NOTE — ASSESSMENT & PLAN NOTE
Patient has history of DVTs and PE status post COVID.  Patient also has history of atrial fibrillation with cardiac ablation.  Patient is in sinus tachycardia today during examination.  Chads Vasc score is 3 points.  Unknown if there is an underlying clotting disorder.  Strongly recommend restarting Eliquis 5 mg b.i.d..

## 2023-03-02 NOTE — ASSESSMENT & PLAN NOTE
Strongly encouraged patient to obtain a new CPAP and wear it nightly.  Patient states that he is not going to wear CPAP at this time.  Patient verbalizes understanding that this can exacerbate atrial fibrillation and worsen his heart function as well as increase his fatigue.

## 2023-03-03 ENCOUNTER — TELEPHONE (OUTPATIENT)
Dept: FAMILY MEDICINE | Facility: CLINIC | Age: 38
End: 2023-03-03

## 2023-03-03 DIAGNOSIS — I10 PRIMARY HYPERTENSION: ICD-10-CM

## 2023-03-03 RX ORDER — VALSARTAN 160 MG/1
160 TABLET ORAL DAILY
Qty: 30 TABLET | Refills: 2 | Status: SHIPPED | OUTPATIENT
Start: 2023-03-03 | End: 2023-06-12 | Stop reason: SDUPTHER

## 2023-03-03 NOTE — TELEPHONE ENCOUNTER
Pt called states he was told take valsartan 80 mg x 2 weeks and if bp didn't go down to double up. Now he is taking 160mg and needs a new rx.  He stated he also did his labs if you would review them and let him know the results.

## 2023-03-16 ENCOUNTER — OFFICE VISIT (OUTPATIENT)
Dept: FAMILY MEDICINE | Facility: CLINIC | Age: 38
End: 2023-03-16
Payer: MEDICAID

## 2023-03-16 VITALS
HEIGHT: 72 IN | TEMPERATURE: 98 F | SYSTOLIC BLOOD PRESSURE: 126 MMHG | HEART RATE: 116 BPM | DIASTOLIC BLOOD PRESSURE: 72 MMHG | OXYGEN SATURATION: 97 % | WEIGHT: 268 LBS | BODY MASS INDEX: 36.3 KG/M2

## 2023-03-16 DIAGNOSIS — I82.5Y3 CHRONIC DEEP VEIN THROMBOSIS (DVT) OF PROXIMAL VEIN OF BOTH LOWER EXTREMITIES: ICD-10-CM

## 2023-03-16 DIAGNOSIS — R73.01 IMPAIRED FASTING GLUCOSE: ICD-10-CM

## 2023-03-16 DIAGNOSIS — I48.0 PAROXYSMAL ATRIAL FIBRILLATION: ICD-10-CM

## 2023-03-16 DIAGNOSIS — E78.2 MIXED HYPERLIPIDEMIA: ICD-10-CM

## 2023-03-16 DIAGNOSIS — G47.00 INSOMNIA, UNSPECIFIED TYPE: ICD-10-CM

## 2023-03-16 DIAGNOSIS — I10 PRIMARY HYPERTENSION: Primary | ICD-10-CM

## 2023-03-16 DIAGNOSIS — M62.838 MUSCLE SPASM: ICD-10-CM

## 2023-03-16 DIAGNOSIS — M54.6 ACUTE MIDLINE THORACIC BACK PAIN: ICD-10-CM

## 2023-03-16 DIAGNOSIS — E78.1 HYPERTRIGLYCERIDEMIA: ICD-10-CM

## 2023-03-16 DIAGNOSIS — E66.01 CLASS 2 SEVERE OBESITY DUE TO EXCESS CALORIES WITH SERIOUS COMORBIDITY AND BODY MASS INDEX (BMI) OF 36.0 TO 36.9 IN ADULT: ICD-10-CM

## 2023-03-16 PROCEDURE — 1159F PR MEDICATION LIST DOCUMENTED IN MEDICAL RECORD: ICD-10-PCS | Mod: CPTII,,, | Performed by: NURSE PRACTITIONER

## 2023-03-16 PROCEDURE — 3008F BODY MASS INDEX DOCD: CPT | Mod: CPTII,,, | Performed by: NURSE PRACTITIONER

## 2023-03-16 PROCEDURE — 3044F PR MOST RECENT HEMOGLOBIN A1C LEVEL <7.0%: ICD-10-PCS | Mod: CPTII,,, | Performed by: NURSE PRACTITIONER

## 2023-03-16 PROCEDURE — 4010F ACE/ARB THERAPY RXD/TAKEN: CPT | Mod: CPTII,,, | Performed by: NURSE PRACTITIONER

## 2023-03-16 PROCEDURE — 3078F DIAST BP <80 MM HG: CPT | Mod: CPTII,,, | Performed by: NURSE PRACTITIONER

## 2023-03-16 PROCEDURE — 3044F HG A1C LEVEL LT 7.0%: CPT | Mod: CPTII,,, | Performed by: NURSE PRACTITIONER

## 2023-03-16 PROCEDURE — 4010F PR ACE/ARB THEARPY RXD/TAKEN: ICD-10-PCS | Mod: CPTII,,, | Performed by: NURSE PRACTITIONER

## 2023-03-16 PROCEDURE — 3078F PR MOST RECENT DIASTOLIC BLOOD PRESSURE < 80 MM HG: ICD-10-PCS | Mod: CPTII,,, | Performed by: NURSE PRACTITIONER

## 2023-03-16 PROCEDURE — 99214 PR OFFICE/OUTPT VISIT, EST, LEVL IV, 30-39 MIN: ICD-10-PCS | Mod: S$PBB,,, | Performed by: NURSE PRACTITIONER

## 2023-03-16 PROCEDURE — 3074F PR MOST RECENT SYSTOLIC BLOOD PRESSURE < 130 MM HG: ICD-10-PCS | Mod: CPTII,,, | Performed by: NURSE PRACTITIONER

## 2023-03-16 PROCEDURE — 1160F RVW MEDS BY RX/DR IN RCRD: CPT | Mod: CPTII,,, | Performed by: NURSE PRACTITIONER

## 2023-03-16 PROCEDURE — 99214 OFFICE O/P EST MOD 30 MIN: CPT | Mod: S$PBB,,, | Performed by: NURSE PRACTITIONER

## 2023-03-16 PROCEDURE — 3008F PR BODY MASS INDEX (BMI) DOCUMENTED: ICD-10-PCS | Mod: CPTII,,, | Performed by: NURSE PRACTITIONER

## 2023-03-16 PROCEDURE — 1159F MED LIST DOCD IN RCRD: CPT | Mod: CPTII,,, | Performed by: NURSE PRACTITIONER

## 2023-03-16 PROCEDURE — 1160F PR REVIEW ALL MEDS BY PRESCRIBER/CLIN PHARMACIST DOCUMENTED: ICD-10-PCS | Mod: CPTII,,, | Performed by: NURSE PRACTITIONER

## 2023-03-16 PROCEDURE — 99214 OFFICE O/P EST MOD 30 MIN: CPT | Performed by: NURSE PRACTITIONER

## 2023-03-16 PROCEDURE — 3074F SYST BP LT 130 MM HG: CPT | Mod: CPTII,,, | Performed by: NURSE PRACTITIONER

## 2023-03-16 NOTE — PROGRESS NOTES
Subjective:       Patient ID: Nabil Urias is a 37 y.o. male.    Chief Complaint: Hypertension, Follow-up, and back muscle  spasms    Patient presents today following up for hypertension, fatigue, dizziness, headache, and history of DVT and PE.  He had previously been off of all of his medications and has recently been restarted.  Patient was started on valsartan 80 mg at his last visit a month ago.  Patient after 2 weeks, was checking his blood pressure at home and noted his blood pressures were still high.  Valsartan was doubled to 160 mg.  Patient took for 2-3 days and then followed up with Cardiology on an outpatient basis.  Blood pressure was still high at that time.  He was also started on metoprolol 25 mg at his cardiology visit.  He was recommended to restart his Eliquis for history of DVT and history of atrial fibrillation.  EKG was done that showed sinus tachycardia.  Patient was also recommended to have a stress test completed.  This is scheduled.  Blood work has been received I reviewed.  Cholesterol has significantly improved since his last lab check with dietary changes.  He is not taking a statin.  He has a history of statin intolerance and tried to in the past with both of them causing severe myalgia and muscle pain.  Blood sugar is elevated.  Patient does not have a personal history of diabetes but does have diabetes in the family.  Hemoglobin A1c will be ordered with his next labs. Patient will be advised on dietary changes to help with this problem today.  Patient is obese with a BMI of 36.35    Dizziness:   Chronicity:  Recurrent  Onset:  More than 1 month ago  Progression since onset:  Resolved  Frequency:  Every few days  Severity:  Moderate  Dizziness characteristics:  Off-balance, sensation of movement and trouble focusing eyes   Associated symptoms: headaches, visual disturbances and chest pain.no hearing loss, no ear congestion, no ear pain, no fever, no tinnitus, no nausea, no vomiting,  no diaphoresis, no aural fullness, no weakness, no light-headedness, no syncope, no palpitations, no panic, no facial weakness, no slurred speech and no numbness in extremities.  Aggravated by:  Exertion  Treatments tried:  Rest (blood pressure medication)  Improvements on treatment:  Resolved and significant   PMH includes: anxiety.no head trauma, no head trauma and no ear infections.  Hypertension  This is a chronic problem. The current episode started more than 1 month ago. The problem has been waxing and waning since onset. The problem is uncontrolled. Associated symptoms include anxiety, blurred vision, chest pain, headaches and malaise/fatigue. Pertinent negatives include no neck pain, orthopnea, palpitations, peripheral edema, PND, shortness of breath or sweats. Risk factors for coronary artery disease include sedentary lifestyle, male gender, obesity, dyslipidemia and stress. Past treatments include lifestyle changes, beta blockers and angiotensin blockers. The current treatment provides significant improvement. Compliance problems include exercise and diet.  There is no history of angina. There is no history of chronic renal disease.   Fatigue  This is a recurrent problem. The current episode started more than 1 month ago. The problem occurs daily. The problem has been waxing and waning. Associated symptoms include chest pain, fatigue and headaches. Pertinent negatives include no abdominal pain, arthralgias, chills, congestion, coughing, diaphoresis, fever, myalgias, nausea, neck pain, rash, sore throat, vomiting or weakness. The symptoms are aggravated by stress and exertion. He has tried lying down, rest, position changes and relaxation for the symptoms. The treatment provided moderate relief.   Review of Systems   Constitutional:  Positive for activity change, fatigue and malaise/fatigue. Negative for appetite change, chills, diaphoresis, fever and unexpected weight change.        Obesity   HENT:   Negative for congestion, ear discharge, ear pain, hearing loss, sore throat, tinnitus, trouble swallowing and voice change.    Eyes:  Positive for blurred vision. Negative for photophobia, pain and visual disturbance.   Respiratory:  Negative for cough, chest tightness and shortness of breath.    Cardiovascular:  Positive for chest pain. Negative for palpitations, orthopnea, leg swelling, syncope and PND.        Hypertension, hyperlipidemia   Gastrointestinal:  Negative for abdominal pain, constipation, diarrhea, nausea and vomiting.   Endocrine: Negative for cold intolerance and heat intolerance.        Elevated fasting glucose   Genitourinary:  Negative for difficulty urinating, dysuria and flank pain.   Musculoskeletal:  Negative for arthralgias, gait problem, myalgias and neck pain.   Skin:  Negative for rash.   Allergic/Immunologic: Negative for immunocompromised state.   Neurological:  Positive for dizziness and headaches. Negative for weakness and light-headedness.   Hematological:  Negative for adenopathy.   Psychiatric/Behavioral:  Negative for agitation, confusion, self-injury and suicidal ideas. The patient is nervous/anxious.      Past Medical History:   Diagnosis Date    Atrial fibrillation     Essential (primary) hypertension     History of cardiac radiofrequency ablation     Hyperlipidemia       Past Surgical History:   Procedure Laterality Date    ABLATION OF ARRHYTHMOGENIC FOCUS FOR ATRIAL FIBRILLATION N/A 1/21/2021    Procedure: ABLATION, ARRHYTHMOGENIC FOCUS, FOR ATRIAL FIBRILLATION;  Surgeon: Daniel Handy MD;  Location: [facility] EP LAB;  Service: Cardiology;  Laterality: N/A;  AF, PVI, GABRIELA (Cx if SR), PVI, RFA, CARTO, GEN, GP, 3 PREP    CARDIOVERSION      TONSILLECTOMY  1990       Family History   Problem Relation Age of Onset    Heart attack Maternal Grandfather        Social History     Socioeconomic History    Marital status:    Tobacco Use    Smoking status: Former     Packs/day: 1.00      Years: 5.00     Pack years: 5.00     Types: Cigarettes     Start date:      Quit date: 2015     Years since quittin.4    Smokeless tobacco: Current     Types: Chew    Tobacco comments:     1/2 can chew qd   Substance and Sexual Activity    Alcohol use: Yes     Alcohol/week: 4.0 standard drinks     Types: 4 Cans of beer per week     Comment: rarely    Drug use: Yes     Frequency: 1.0 times per week     Types: Marijuana     Comment: Hx: Heroin (last use 2012) smokes weed ocassionally     Sexual activity: Yes     Partners: Female   Social History Narrative    ** Merged History Encounter **            Current Outpatient Medications   Medication Sig Dispense Refill    apixaban (ELIQUIS) 5 mg Tab Take 1 tablet (5 mg total) by mouth 2 (two) times daily. 60 tablet 11    metoprolol succinate (TOPROL-XL) 25 MG 24 hr tablet Take 0.5 tablets (12.5 mg total) by mouth once daily. 30 tablet 11    valsartan (DIOVAN) 160 MG tablet Take 1 tablet (160 mg total) by mouth once daily. 30 tablet 2     No current facility-administered medications for this visit.       Review of patient's allergies indicates:  No Known Allergies  Objective:      Blood pressure 126/72, pulse (!) 116, temperature 97.8 °F (36.6 °C), height 6' (1.829 m), weight 121.6 kg (268 lb), SpO2 97 %. Body mass index is 36.35 kg/m².   Physical Exam  Vitals and nursing note reviewed.   Constitutional:       General: He is not in acute distress.     Appearance: Normal appearance. He is well-developed. He is obese. He is not ill-appearing.   HENT:      Head: Normocephalic and atraumatic.      Right Ear: Tympanic membrane, ear canal and external ear normal.      Left Ear: Tympanic membrane, ear canal and external ear normal.      Nose: Nose normal.      Mouth/Throat:      Mouth: Mucous membranes are moist.      Pharynx: Uvula midline.   Eyes:      General: Lids are normal.      Extraocular Movements: Extraocular movements intact.       Conjunctiva/sclera: Conjunctivae normal.      Pupils: Pupils are equal, round, and reactive to light.   Cardiovascular:      Rate and Rhythm: Normal rate and regular rhythm.      Pulses: Normal pulses.      Heart sounds: Normal heart sounds, S1 normal and S2 normal. No murmur heard.  Pulmonary:      Effort: Pulmonary effort is normal. No respiratory distress.      Breath sounds: Normal breath sounds. No wheezing.   Abdominal:      General: Bowel sounds are normal.      Palpations: Abdomen is soft.      Tenderness: There is no abdominal tenderness.   Musculoskeletal:         General: Normal range of motion.      Cervical back: Normal range of motion and neck supple.   Lymphadenopathy:      Cervical: No cervical adenopathy.   Skin:     General: Skin is warm and dry.      Findings: No rash.   Neurological:      General: No focal deficit present.      Mental Status: He is alert and oriented to person, place, and time. Mental status is at baseline.   Psychiatric:         Mood and Affect: Mood normal.         Speech: Speech normal.         Behavior: Behavior normal.         Thought Content: Thought content normal.         Cognition and Memory: Cognition normal.           Assessment:       1. Primary hypertension    2. Insomnia, unspecified type    3. Mixed hyperlipidemia    4. Hypertriglyceridemia    5. Impaired fasting glucose    6. Acute midline thoracic back pain    7. Muscle spasm    8. Paroxysmal atrial fibrillation    9. Chronic deep vein thrombosis (DVT) of proximal vein of both lower extremities    10. Class 2 severe obesity due to excess calories with serious comorbidity and body mass index (BMI) of 36.0 to 36.9 in adult        Plan:       Nabil was seen today for hypertension, follow-up and back muscle  spasms.    Diagnoses and all orders for this visit:    Primary hypertension  -     CBC Auto Differential; Future  -     Comprehensive Metabolic Panel; Future  -     Urinalysis; Future    Continue valsartan 160 mg  along with metoprolol 25 mg.  Continue follow-up with Cardiology for stress test.    Blood pressure is controlled with current medication regimen    Lifestyle changes: Reduce the amount of salt in your diet; Lose weight; Avoid drinking too much alcohol; Exercise at least 30 minutes per day most days of the week.  Continue current medications and home BP monitoring.      Insomnia, unspecified type  Stable.  Continue current medications    Mixed hyperlipidemia  -     Lipid Panel; Future  Limit red meat, butter, fried foods, cheese, and other foods that have a lot of saturated fat. Consume more: lean meats, fish, fruits, vegetables, whole grains, beans, lentils, and nuts.  Weight loss, and 30-45 min of cardiovascular exercise daily.      Hypertriglyceridemia  -     Lipid Panel; Future  Improved    Impaired fasting glucose  -     Hemoglobin A1C; Future  Discussed Lifestyle Modifications: Low glycemic index diet, Exercise (30-45 min) daily, Weight loss, and Smoking cessation     Acute midline thoracic back pain  Offered patient physical therapy.  Patient denies and states that he will self treat at home.    Muscle spasm  Offered patient physical therapy.  Patient denies and states that he will self treat at home.    Paroxysmal atrial fibrillation  On current anticoagulation  Continue    Chronic deep vein thrombosis (DVT) of proximal vein of both lower extremities  On current anticoagulation  Continue    Class 2 severe obesity due to excess calories with serious comorbidity and body mass index (BMI) of 36.0 to 36.9 in adult  The patient is asked to make an attempt to improve diet and exercise patterns to aid in medical management of this problem.    Repeat labs in 6 months.  Continue follow-up closely with Cardiology for cardiac testing.

## 2023-03-22 ENCOUNTER — TELEPHONE (OUTPATIENT)
Dept: CARDIOLOGY | Facility: HOSPITAL | Age: 38
End: 2023-03-22

## 2023-03-22 NOTE — TELEPHONE ENCOUNTER
Spoke with patient's wife.     Patient advised, test will be at [facility] ([address] [facility]   Will need to register on the first floor at the main entrance.   Patient advised that arrival time is 8:45am.  Patient advised that he may be here about 3.5-4 hours, and may want to bring something to occupy their time, as there will be periods of waiting.    Patient advised, may take his medications prior to testing if you need to.  Patient should HOLD Metoprolol. Advised if he needs to eat to take his medications, please keep it light, like toast and juice.    Patient advised to avoid all caffeine 12 hours prior to testing.  This includes decaf tea and coffee.    Will provide peanut butter crackers for a snack after stress test.  If patient would prefer something else, please bring a snack from home.    Wear comfortable clothing.   No lotions, oils, or powders to the upper chest area. May wear deodorant.    No metal jewelry, buttons, or zippers to the upper body.  Patient verbalizes understanding of instructions.

## 2023-03-23 ENCOUNTER — HOSPITAL ENCOUNTER (OUTPATIENT)
Dept: CARDIOLOGY | Facility: HOSPITAL | Age: 38
Discharge: HOME | End: 2023-03-23
Payer: MEDICAID

## 2023-03-23 ENCOUNTER — HOSPITAL ENCOUNTER (OUTPATIENT)
Dept: RADIOLOGY | Facility: HOSPITAL | Age: 38
Discharge: HOME | End: 2023-03-23
Payer: MEDICAID

## 2023-03-23 DIAGNOSIS — R06.09 DYSPNEA ON EXERTION: ICD-10-CM

## 2023-03-23 PROCEDURE — 93018 CV STRESS TEST I&R ONLY: CPT | Mod: ,,, | Performed by: INTERNAL MEDICINE

## 2023-03-23 PROCEDURE — 78452 HT MUSCLE IMAGE SPECT MULT: CPT | Mod: 26,,, | Performed by: INTERNAL MEDICINE

## 2023-03-23 PROCEDURE — 93018 NUCLEAR STRESS - CARDIOLOGY INTERPRETED (CUPID ONLY): ICD-10-PCS | Mod: ,,, | Performed by: INTERNAL MEDICINE

## 2023-03-23 PROCEDURE — 93016 CV STRESS TEST SUPVJ ONLY: CPT | Mod: ,,, | Performed by: INTERNAL MEDICINE

## 2023-03-23 PROCEDURE — 78452 NUCLEAR STRESS - CARDIOLOGY INTERPRETED (CUPID ONLY): ICD-10-PCS | Mod: 26,,, | Performed by: INTERNAL MEDICINE

## 2023-03-23 PROCEDURE — A9502 TC99M TETROFOSMIN: HCPCS

## 2023-03-23 PROCEDURE — 78452 HT MUSCLE IMAGE SPECT MULT: CPT

## 2023-03-23 PROCEDURE — 93016 NUCLEAR STRESS - CARDIOLOGY INTERPRETED (CUPID ONLY): ICD-10-PCS | Mod: ,,, | Performed by: INTERNAL MEDICINE

## 2023-03-24 ENCOUNTER — HOSPITAL ENCOUNTER (OUTPATIENT)
Dept: RADIOLOGY | Facility: HOSPITAL | Age: 38
Discharge: HOME | End: 2023-03-24
Payer: MEDICAID

## 2023-03-29 LAB
CV STRESS BASE HR: 63 BPM
DIASTOLIC BLOOD PRESSURE: 90 MMHG
EJECTION FRACTION- HIGH: 65 %
END DIASTOLIC INDEX-HIGH: 153 ML/M2
END DIASTOLIC INDEX-LOW: 93 ML/M2
END SYSTOLIC INDEX-HIGH: 71 ML/M2
END SYSTOLIC INDEX-LOW: 31 ML/M2
NUC REST DIASTOLIC VOLUME INDEX: 110
NUC REST EJECTION FRACTION: 50
NUC REST SYSTOLIC VOLUME INDEX: 55
NUC STRESS DIASTOLIC VOLUME INDEX: 75
NUC STRESS EJECTION FRACTION: 59 %
NUC STRESS SYSTOLIC VOLUME INDEX: 31
OHS CV CPX 1 MINUTE RECOVERY HEART RATE: 147 BPM
OHS CV CPX 85 PERCENT MAX PREDICTED HEART RATE MALE: 156
OHS CV CPX ESTIMATED METS: 10
OHS CV CPX MAX PREDICTED HEART RATE: 184
OHS CV CPX PATIENT IS FEMALE: 0
OHS CV CPX PATIENT IS MALE: 1
OHS CV CPX PEAK DIASTOLIC BLOOD PRESSURE: 86 MMHG
OHS CV CPX PEAK HEAR RATE: 168 BPM
OHS CV CPX PEAK RATE PRESSURE PRODUCT: NORMAL
OHS CV CPX PEAK SYSTOLIC BLOOD PRESSURE: 164 MMHG
OHS CV CPX PERCENT MAX PREDICTED HEART RATE ACHIEVED: 91
OHS CV CPX RATE PRESSURE PRODUCT PRESENTING: 8568
RETIRED EF AND QEF - SEE NOTES: 53 %
STRESS ECHO POST EXERCISE DUR MIN: 7 MINUTES
STRESS ECHO POST EXERCISE DUR SEC: 5 SECONDS
SYSTOLIC BLOOD PRESSURE: 136 MMHG

## 2023-04-04 ENCOUNTER — OFFICE VISIT (OUTPATIENT)
Dept: FAMILY MEDICINE | Facility: CLINIC | Age: 38
End: 2023-04-04
Payer: MEDICAID

## 2023-04-04 VITALS
OXYGEN SATURATION: 98 % | HEART RATE: 80 BPM | HEIGHT: 72 IN | DIASTOLIC BLOOD PRESSURE: 76 MMHG | WEIGHT: 272 LBS | SYSTOLIC BLOOD PRESSURE: 134 MMHG | TEMPERATURE: 98 F | BODY MASS INDEX: 36.84 KG/M2

## 2023-04-04 DIAGNOSIS — H66.002 ACUTE SUPPURATIVE OTITIS MEDIA OF LEFT EAR WITHOUT SPONTANEOUS RUPTURE OF TYMPANIC MEMBRANE, RECURRENCE NOT SPECIFIED: ICD-10-CM

## 2023-04-04 DIAGNOSIS — J01.00 ACUTE MAXILLARY SINUSITIS, RECURRENCE NOT SPECIFIED: ICD-10-CM

## 2023-04-04 DIAGNOSIS — R05.1 ACUTE COUGH: Primary | ICD-10-CM

## 2023-04-04 LAB
CTP QC/QA: YES
SARS-COV-2 RDRP RESP QL NAA+PROBE: NEGATIVE

## 2023-04-04 PROCEDURE — 87635 SARS-COV-2 COVID-19 AMP PRB: CPT | Mod: PBBFAC | Performed by: NURSE PRACTITIONER

## 2023-04-04 PROCEDURE — 3078F DIAST BP <80 MM HG: CPT | Mod: CPTII,,, | Performed by: NURSE PRACTITIONER

## 2023-04-04 PROCEDURE — 4010F PR ACE/ARB THEARPY RXD/TAKEN: ICD-10-PCS | Mod: CPTII,,, | Performed by: NURSE PRACTITIONER

## 2023-04-04 PROCEDURE — 3044F HG A1C LEVEL LT 7.0%: CPT | Mod: CPTII,,, | Performed by: NURSE PRACTITIONER

## 2023-04-04 PROCEDURE — 96372 THER/PROPH/DIAG INJ SC/IM: CPT | Mod: PBBFAC | Performed by: NURSE PRACTITIONER

## 2023-04-04 PROCEDURE — 3075F PR MOST RECENT SYSTOLIC BLOOD PRESS GE 130-139MM HG: ICD-10-PCS | Mod: CPTII,,, | Performed by: NURSE PRACTITIONER

## 2023-04-04 PROCEDURE — 99213 PR OFFICE/OUTPT VISIT, EST, LEVL III, 20-29 MIN: ICD-10-PCS | Mod: S$PBB,,, | Performed by: NURSE PRACTITIONER

## 2023-04-04 PROCEDURE — 1159F PR MEDICATION LIST DOCUMENTED IN MEDICAL RECORD: ICD-10-PCS | Mod: CPTII,,, | Performed by: NURSE PRACTITIONER

## 2023-04-04 PROCEDURE — 3078F PR MOST RECENT DIASTOLIC BLOOD PRESSURE < 80 MM HG: ICD-10-PCS | Mod: CPTII,,, | Performed by: NURSE PRACTITIONER

## 2023-04-04 PROCEDURE — 99214 OFFICE O/P EST MOD 30 MIN: CPT | Performed by: NURSE PRACTITIONER

## 2023-04-04 PROCEDURE — 99213 OFFICE O/P EST LOW 20 MIN: CPT | Mod: S$PBB,,, | Performed by: NURSE PRACTITIONER

## 2023-04-04 PROCEDURE — 3075F SYST BP GE 130 - 139MM HG: CPT | Mod: CPTII,,, | Performed by: NURSE PRACTITIONER

## 2023-04-04 PROCEDURE — 4010F ACE/ARB THERAPY RXD/TAKEN: CPT | Mod: CPTII,,, | Performed by: NURSE PRACTITIONER

## 2023-04-04 PROCEDURE — 3008F BODY MASS INDEX DOCD: CPT | Mod: CPTII,,, | Performed by: NURSE PRACTITIONER

## 2023-04-04 PROCEDURE — 1160F PR REVIEW ALL MEDS BY PRESCRIBER/CLIN PHARMACIST DOCUMENTED: ICD-10-PCS | Mod: CPTII,,, | Performed by: NURSE PRACTITIONER

## 2023-04-04 PROCEDURE — 3008F PR BODY MASS INDEX (BMI) DOCUMENTED: ICD-10-PCS | Mod: CPTII,,, | Performed by: NURSE PRACTITIONER

## 2023-04-04 PROCEDURE — 1159F MED LIST DOCD IN RCRD: CPT | Mod: CPTII,,, | Performed by: NURSE PRACTITIONER

## 2023-04-04 PROCEDURE — 1160F RVW MEDS BY RX/DR IN RCRD: CPT | Mod: CPTII,,, | Performed by: NURSE PRACTITIONER

## 2023-04-04 PROCEDURE — 3044F PR MOST RECENT HEMOGLOBIN A1C LEVEL <7.0%: ICD-10-PCS | Mod: CPTII,,, | Performed by: NURSE PRACTITIONER

## 2023-04-04 RX ORDER — PROMETHAZINE HYDROCHLORIDE AND DEXTROMETHORPHAN HYDROBROMIDE 6.25; 15 MG/5ML; MG/5ML
5 SYRUP ORAL NIGHTLY PRN
Qty: 180 ML | Refills: 0 | Status: SHIPPED | OUTPATIENT
Start: 2023-04-04 | End: 2023-05-10

## 2023-04-04 RX ORDER — AMOXICILLIN AND CLAVULANATE POTASSIUM 875; 125 MG/1; MG/1
1 TABLET, FILM COATED ORAL 2 TIMES DAILY
Qty: 20 TABLET | Refills: 0 | Status: SHIPPED | OUTPATIENT
Start: 2023-04-04 | End: 2023-07-27

## 2023-04-04 RX ORDER — DEXAMETHASONE SODIUM PHOSPHATE 4 MG/ML
8 INJECTION, SOLUTION INTRA-ARTICULAR; INTRALESIONAL; INTRAMUSCULAR; INTRAVENOUS; SOFT TISSUE
Status: COMPLETED | OUTPATIENT
Start: 2023-04-04 | End: 2023-04-04

## 2023-04-04 RX ADMIN — DEXAMETHASONE SODIUM PHOSPHATE 8 MG: 4 INJECTION, SOLUTION INTRA-ARTICULAR; INTRALESIONAL; INTRAMUSCULAR; INTRAVENOUS; SOFT TISSUE at 10:04

## 2023-04-06 ENCOUNTER — TELEPHONE (OUTPATIENT)
Dept: CARDIOLOGY | Facility: CLINIC | Age: 38
End: 2023-04-06
Payer: MEDICAID

## 2023-04-06 NOTE — TELEPHONE ENCOUNTER
----- Message from Alyssa Yost NP sent at 3/29/2023  3:41 PM CDT -----  Mr. Urias,    Your stress test is negative for reversible ischemia. The test is about 90% accurate. This means there are no blockages that are causing a problem, ( meaning over 70% blocked.) According to this test you are getting enough blood flow to the coronary arteries. If you are not having any symptoms we can push back your appointment, and save a copay. If you are having symptoms we will be happy to see you.      Thank you,    Alyssa Yost NP

## 2023-06-12 DIAGNOSIS — I10 PRIMARY HYPERTENSION: ICD-10-CM

## 2023-06-12 RX ORDER — VALSARTAN 160 MG/1
160 TABLET ORAL DAILY
Qty: 90 TABLET | Refills: 1 | Status: SHIPPED | OUTPATIENT
Start: 2023-06-12 | End: 2023-07-27

## 2023-07-27 ENCOUNTER — OFFICE VISIT (OUTPATIENT)
Dept: CARDIOLOGY | Facility: CLINIC | Age: 38
End: 2023-07-27
Payer: MEDICAID

## 2023-07-27 VITALS
OXYGEN SATURATION: 99 % | DIASTOLIC BLOOD PRESSURE: 108 MMHG | SYSTOLIC BLOOD PRESSURE: 160 MMHG | BODY MASS INDEX: 37.11 KG/M2 | WEIGHT: 274 LBS | HEART RATE: 109 BPM | HEIGHT: 72 IN

## 2023-07-27 DIAGNOSIS — G47.33 OSA (OBSTRUCTIVE SLEEP APNEA): ICD-10-CM

## 2023-07-27 DIAGNOSIS — Z86.718 HISTORY OF DVT OF LOWER EXTREMITY: ICD-10-CM

## 2023-07-27 DIAGNOSIS — M79.662 BILATERAL CALF PAIN: Primary | ICD-10-CM

## 2023-07-27 DIAGNOSIS — Z86.79 HISTORY OF ATRIAL FIBRILLATION: ICD-10-CM

## 2023-07-27 DIAGNOSIS — F41.9 ANXIETY: ICD-10-CM

## 2023-07-27 DIAGNOSIS — I10 PRIMARY HYPERTENSION: ICD-10-CM

## 2023-07-27 DIAGNOSIS — F51.01 PRIMARY INSOMNIA: ICD-10-CM

## 2023-07-27 DIAGNOSIS — R60.0 BILATERAL LOWER EXTREMITY EDEMA: ICD-10-CM

## 2023-07-27 DIAGNOSIS — M79.661 BILATERAL CALF PAIN: Primary | ICD-10-CM

## 2023-07-27 DIAGNOSIS — Z79.01 CURRENT USE OF LONG TERM ANTICOAGULATION: ICD-10-CM

## 2023-07-27 PROCEDURE — 99999 PR PBB SHADOW E&M-EST. PATIENT-LVL IV: CPT | Mod: PBBFAC,,, | Performed by: NURSE PRACTITIONER

## 2023-07-27 PROCEDURE — 3008F PR BODY MASS INDEX (BMI) DOCUMENTED: ICD-10-PCS | Mod: CPTII,,, | Performed by: NURSE PRACTITIONER

## 2023-07-27 PROCEDURE — 3080F DIAST BP >= 90 MM HG: CPT | Mod: CPTII,,, | Performed by: NURSE PRACTITIONER

## 2023-07-27 PROCEDURE — 3044F HG A1C LEVEL LT 7.0%: CPT | Mod: CPTII,,, | Performed by: NURSE PRACTITIONER

## 2023-07-27 PROCEDURE — 3077F SYST BP >= 140 MM HG: CPT | Mod: CPTII,,, | Performed by: NURSE PRACTITIONER

## 2023-07-27 PROCEDURE — 4010F ACE/ARB THERAPY RXD/TAKEN: CPT | Mod: CPTII,,, | Performed by: NURSE PRACTITIONER

## 2023-07-27 PROCEDURE — 99999 PR PBB SHADOW E&M-EST. PATIENT-LVL IV: ICD-10-PCS | Mod: PBBFAC,,, | Performed by: NURSE PRACTITIONER

## 2023-07-27 PROCEDURE — 99214 OFFICE O/P EST MOD 30 MIN: CPT | Mod: S$PBB,,, | Performed by: NURSE PRACTITIONER

## 2023-07-27 PROCEDURE — 4010F PR ACE/ARB THEARPY RXD/TAKEN: ICD-10-PCS | Mod: CPTII,,, | Performed by: NURSE PRACTITIONER

## 2023-07-27 PROCEDURE — 1160F PR REVIEW ALL MEDS BY PRESCRIBER/CLIN PHARMACIST DOCUMENTED: ICD-10-PCS | Mod: CPTII,,, | Performed by: NURSE PRACTITIONER

## 2023-07-27 PROCEDURE — 1159F PR MEDICATION LIST DOCUMENTED IN MEDICAL RECORD: ICD-10-PCS | Mod: CPTII,,, | Performed by: NURSE PRACTITIONER

## 2023-07-27 PROCEDURE — 99214 OFFICE O/P EST MOD 30 MIN: CPT | Mod: PBBFAC,PN | Performed by: NURSE PRACTITIONER

## 2023-07-27 PROCEDURE — 3077F PR MOST RECENT SYSTOLIC BLOOD PRESSURE >= 140 MM HG: ICD-10-PCS | Mod: CPTII,,, | Performed by: NURSE PRACTITIONER

## 2023-07-27 PROCEDURE — 3008F BODY MASS INDEX DOCD: CPT | Mod: CPTII,,, | Performed by: NURSE PRACTITIONER

## 2023-07-27 PROCEDURE — 3044F PR MOST RECENT HEMOGLOBIN A1C LEVEL <7.0%: ICD-10-PCS | Mod: CPTII,,, | Performed by: NURSE PRACTITIONER

## 2023-07-27 PROCEDURE — 1159F MED LIST DOCD IN RCRD: CPT | Mod: CPTII,,, | Performed by: NURSE PRACTITIONER

## 2023-07-27 PROCEDURE — 1160F RVW MEDS BY RX/DR IN RCRD: CPT | Mod: CPTII,,, | Performed by: NURSE PRACTITIONER

## 2023-07-27 PROCEDURE — 3080F PR MOST RECENT DIASTOLIC BLOOD PRESSURE >= 90 MM HG: ICD-10-PCS | Mod: CPTII,,, | Performed by: NURSE PRACTITIONER

## 2023-07-27 PROCEDURE — 99214 PR OFFICE/OUTPT VISIT, EST, LEVL IV, 30-39 MIN: ICD-10-PCS | Mod: S$PBB,,, | Performed by: NURSE PRACTITIONER

## 2023-07-27 RX ORDER — METOPROLOL SUCCINATE 25 MG/1
25 TABLET, EXTENDED RELEASE ORAL 2 TIMES DAILY
Qty: 180 TABLET | Refills: 3 | Status: ON HOLD | OUTPATIENT
Start: 2023-07-27 | End: 2023-08-18 | Stop reason: HOSPADM

## 2023-07-27 RX ORDER — LANOLIN ALCOHOL/MO/W.PET/CERES
400 CREAM (GRAM) TOPICAL DAILY
Qty: 90 TABLET | Refills: 3 | Status: SHIPPED | OUTPATIENT
Start: 2023-07-27

## 2023-07-27 RX ORDER — METOPROLOL SUCCINATE 25 MG/1
25 TABLET, EXTENDED RELEASE ORAL DAILY
Qty: 90 TABLET | Refills: 3 | Status: SHIPPED | OUTPATIENT
Start: 2023-07-27 | End: 2023-07-27 | Stop reason: SDUPTHER

## 2023-07-27 RX ORDER — VALSARTAN 320 MG/1
320 TABLET ORAL DAILY
Qty: 90 TABLET | Refills: 1 | Status: SHIPPED | OUTPATIENT
Start: 2023-07-27 | End: 2024-03-06 | Stop reason: SDUPTHER

## 2023-07-27 NOTE — PROGRESS NOTES
Subjective:    Patient ID:  Nabil Urias is a 38 y.o. male patient here for evaluation Follow-up, Hypertension, Dizziness, Headache, Claudication (Calve muscles are sore feeling and patient states not from any activity ), and Edema (Weight gain is noticed )    History of Present Illness:     Patient is a 38-year-old male clinic today with his wife to follow up on high blood pressure  He will have vision floaters with blood pressure being high  Patient's wife states blood pressure has been around 145/93 at home.    Patient states he does have lots of anxiety.  Review of records shows that he has tried multiple anti anxiety and in the past.  He also suffers from insomnia and only sleeps 3-4 hours nightly he states.  He reportedly has trouble initiating sleep and maintaining staying asleep.  He has tried and failed CPAP in the past.  Patient reports compliance with valsartan 160 mg and metoprolol 12.5 mg daily.    Patient also has history of atrial fibrillation with ablation in 01/2021.  Patient also has a history of bilateral DVT to lower extremities and is complaining of muscle cramping in bilateral lower extremities, particularly 1st thing in the morning.  Patient also complains of changing his diet and reportedly eating healthy cannot drop any weight.  Last A1c reviewed earlier this year was 5.1.    Blood pressure is elevated higher than his baseline at home in today in office, he states he does have have higher blood pressure readings when he is at the doctor's office.    Most Recent Echocardiogram Results  Results for orders placed during the hospital encounter of 08/18/21    Echo    Interpretation Summary  · The estimated PA systolic pressure is 36 mmHg.  · Concentric remodeling and normal systolic function.  · The estimated ejection fraction is 65%.  · Normal left ventricular diastolic function.  · Normal right ventricular size with normal right ventricular systolic function.  · Mild mitral  regurgitation.  · Mild tricuspid regurgitation.      Most Recent Nuclear Stress Test Results  Results for orders placed during the hospital encounter of 03/23/23    Nuclear Stress - Cardiology Interpreted    Interpretation Summary    Normal myocardial perfusion scan. There is no evidence of myocardial ischemia or infarction.    There is a  mild intensity fixed perfusion abnormality in the inferior wall of the left ventricle secondary to diaphragm attenuation.    There is moderate apical thinning which is a normal variant.    The gated perfusion images showed an ejection fraction of 50% at rest. The gated perfusion images showed an ejection fraction of 59% post stress. Normal ejection fraction is greater than 53%.    There is normal wall motion post stress.    There is mild global hypokinesis at rest.    LV cavity size is normal at rest and normal at stress.    The ECG portion of the study is negative for ischemia.    The patient reported no chest pain during the stress test.    There were no arrhythmias during stress.    The patient exercised for 7 minutes 5 seconds on a Jeremiah protocol, corresponding to a functional capacity of 10 METS, achieving a peak heart rate of 168 bpm, which is 91 % of the age predicted maximum heart rate.      Most Recent Cardiac PET Stress Test Results  No results found for this or any previous visit.      Most Recent Cardiovascular Angiogram results  No results found for this or any previous visit.      Other Most Recent Cardiology Results  Results for orders placed during the hospital encounter of 08/18/21    CARDIAC MONITORING STRIPS      REVIEW OF SYSTEMS: As noted in HPI   .     Past Medical History:   Diagnosis Date    Atrial fibrillation     Essential (primary) hypertension     History of cardiac radiofrequency ablation     Hyperlipidemia      Past Surgical History:   Procedure Laterality Date    ABLATION OF ARRHYTHMOGENIC FOCUS FOR ATRIAL FIBRILLATION N/A 1/21/2021    Procedure:  ABLATION, ARRHYTHMOGENIC FOCUS, FOR ATRIAL FIBRILLATION;  Surgeon: Daniel Handy MD;  Location: [facility] EP LAB;  Service: Cardiology;  Laterality: N/A;  AF, PVI, GABRIELA (Cx if SR), PVI, RFA, CARTO, GEN, GP, 3 PREP    CARDIOVERSION      TONSILLECTOMY       Social History     Tobacco Use    Smoking status: Former     Current packs/day: 0.00     Average packs/day: 1 pack/day for 5.0 years (5.0 ttl pk-yrs)     Types: Cigarettes     Start date:      Quit date: 2015     Years since quittin.8    Smokeless tobacco: Current     Types: Chew    Tobacco comments:     1/2 can chew qd   Substance Use Topics    Alcohol use: Yes     Alcohol/week: 4.0 standard drinks of alcohol     Types: 4 Cans of beer per week     Comment: rarely    Drug use: Yes     Frequency: 1.0 times per week     Types: Marijuana     Comment: Hx: Heroin (last use 2012) smokes weed ocassionally          Objective      Vitals:    23 1114   BP: (!) 160/108   Pulse: 109       LAST EKG  Results for orders placed or performed in visit on 23   IN OFFICE EKG 12-LEAD (to [place])    Collection Time: 23  2:27 PM    Narrative    Test Reason : Z00.00,    Vent. Rate : 101 BPM     Atrial Rate : 101 BPM     P-R Int : 120 ms          QRS Dur : 082 ms      QT Int : 328 ms       P-R-T Axes : 035 111 -12 degrees     QTc Int : 425 ms    Sinus tachycardia  Left posterior fascicular block  Possible Inferior infarct ,age undetermined  Abnormal ECG  When compared with ECG of 18-AUG-2021 11:54,  T wave inversion now evident in Inferior leads  Confirmed by Autumn GALLEGOS, Lexa RAMSAY ([id number]) on 3/11/2023 12:49:37 PM    Referred By:  NW           Confirmed By:Lexa Leyva MD     LIPIDS - LAST 2   Lab Results   Component Value Date    CHOL 218 (H) 2023    CHOL 218 (H) 2023    HDL 41 2023    HDL 49 2023    LDLCALC 127.2 2023    LDLCALC 128.8 2023    TRIG 249 (H) 2023    TRIG 201 (H) 2023    CHOLHDL 18.8 (L)  08/02/2023    CHOLHDL 22.5 02/28/2023     CARDIAC PROFILE - LAST 2  Lab Results   Component Value Date    BNP 47 08/19/2021    BNP 20 11/07/2020    BNP 20 11/07/2020    CPK 75 08/18/2021     08/18/2021    CPKMB 2.6 08/07/2020     08/18/2021    TROPONINI <0.030 08/19/2021    TROPONINI <0.030 11/08/2020      CBC - LAST 2  Lab Results   Component Value Date    WBC 9.92 08/02/2023    WBC 10.99 02/28/2023    RBC 4.90 08/02/2023    RBC 4.97 02/28/2023    HGB 16.4 08/02/2023    HGB 16.6 02/28/2023    HCT 46.2 08/02/2023    HCT 47.1 02/28/2023     08/02/2023     02/28/2023     Lab Results   Component Value Date    LABPT 12.6 11/07/2020    INR 1.0 01/12/2021    INR 1.0 11/07/2020    APTT 28.0 01/12/2021     CHEMISTRY - LAST 2  Lab Results   Component Value Date     08/02/2023     02/28/2023    K 3.9 08/02/2023    K 4.1 02/28/2023     08/02/2023     02/28/2023    CO2 27 08/02/2023    CO2 28 02/28/2023    ANIONGAP 8 08/02/2023    ANIONGAP 8 02/28/2023    BUN 19 08/02/2023    BUN 16 02/28/2023    CREATININE 1.3 08/02/2023    CREATININE 1.2 02/28/2023     (H) 08/02/2023     (H) 02/28/2023    CALCIUM 9.2 08/02/2023    CALCIUM 9.6 02/28/2023    MG 1.9 08/19/2021    MG 1.9 11/08/2020    ALBUMIN 4.2 08/02/2023    ALBUMIN 4.5 02/28/2023    PROT 7.4 08/02/2023    PROT 8.0 02/28/2023    ALKPHOS 81 08/02/2023    ALKPHOS 71 02/28/2023    ALT 68 (H) 08/02/2023    ALT 40 02/28/2023    AST 44 (H) 08/02/2023    AST 28 02/28/2023    BILITOT 1.7 (H) 08/02/2023    BILITOT 1.0 02/28/2023      ENDOCRINE - LAST 2  Lab Results   Component Value Date    HGBA1C 5.2 08/02/2023    HGBA1C 5.1 02/28/2023    TSH 1.790 11/07/2020    TSH 1.610 08/08/2020        PHYSICAL EXAM  CONSTITUTIONAL: Well built, well nourished young male breathing comfortably in no apparent distress  NECK: no carotid bruit, no JVD  LUNGS: CTA  CHEST WALL: no tenderness  HEART:  Blood pressure is elevated in office  today.  Apical pulse is 100 beats per minute regular rate and rhythm, S1, S2 normal, no murmur, click, rub or gallop   ABDOMEN: soft, these, non-tender; bowel sounds normal; no masses,    EXTREMITIES: Extremities normal, no edema, no calf tenderness noted  NEURO: AAO X 3    I HAVE REVIEWED :    The vital signs, most recent cardiac testing, and most recent pertinent non-cardiology provider notes.    Current Outpatient Medications   Medication Instructions    ELIQUIS 5 mg, Oral, 2 times daily    magnesium oxide (MAG-OX) 400 mg, Oral, Daily    metoprolol succinate (TOPROL-XL) 25 mg, Oral, 2 times daily    valsartan (DIOVAN) 320 mg, Oral, Daily      Assessment & Plan     RAMIRO (obstructive sleep apnea)  Sleep apnea, history of CPAP failure  Discussed Inspire device  Referral to sleep disorder     Current use of long term anticoagulation  Hx DVT and PE; continue Eliquis 5 mg BID    Bilateral lower extremity edema  Repeat US to r/o DVT; history of DVT  Continue Eliquis 5 mg BID    History of atrial fibrillation  History of Afib w/ ablation 1/2021  Continue Eliquis, magnesium oxide  as directed  Increase metoprolol to 25 mg b.i.d.    Hypertension  Blood pressure was high in the office today 160/108 mm Hg  Increase valsartan to 320 mg daily and monitor blood pressure at home  Follow-up for sleep study evaluation  Low-sodium diet discussion along with weight loss    BMI 37.0-37.9, adult  Weight loss promotion via heart healthy low-calorie diet    Anxiety  Patient's wife concur that he has a long history of anxiety that has not been responsive to medications.  Discussion held on link between anxiety and hypertension  Counseling recommended and follow up with primary care    Insomnia  Patient reports history of insomnia along with sleep apnea.  Referral sent to [facility] for evaluation of both

## 2023-08-02 ENCOUNTER — LAB VISIT (OUTPATIENT)
Dept: LAB | Facility: HOSPITAL | Age: 38
End: 2023-08-02
Attending: NURSE PRACTITIONER
Payer: MEDICAID

## 2023-08-02 DIAGNOSIS — R73.01 IMPAIRED FASTING GLUCOSE: ICD-10-CM

## 2023-08-02 DIAGNOSIS — I10 PRIMARY HYPERTENSION: ICD-10-CM

## 2023-08-02 DIAGNOSIS — E78.2 MIXED HYPERLIPIDEMIA: ICD-10-CM

## 2023-08-02 DIAGNOSIS — E78.1 HYPERTRIGLYCERIDEMIA: ICD-10-CM

## 2023-08-02 LAB
ALBUMIN SERPL BCP-MCNC: 4.2 G/DL (ref 3.5–5.2)
ALP SERPL-CCNC: 81 U/L (ref 55–135)
ALT SERPL W/O P-5'-P-CCNC: 68 U/L (ref 10–44)
ANION GAP SERPL CALC-SCNC: 8 MMOL/L (ref 8–16)
AST SERPL-CCNC: 44 U/L (ref 10–40)
BASOPHILS # BLD AUTO: 0.1 K/UL (ref 0–0.2)
BASOPHILS NFR BLD: 1 % (ref 0–1.9)
BILIRUB SERPL-MCNC: 1.7 MG/DL (ref 0.1–1)
BILIRUB UR QL STRIP: NEGATIVE
BUN SERPL-MCNC: 19 MG/DL (ref 6–20)
CALCIUM SERPL-MCNC: 9.2 MG/DL (ref 8.7–10.5)
CHLORIDE SERPL-SCNC: 105 MMOL/L (ref 95–110)
CHOLEST SERPL-MCNC: 218 MG/DL (ref 120–199)
CHOLEST/HDLC SERPL: 5.3 {RATIO} (ref 2–5)
CLARITY UR: CLEAR
CO2 SERPL-SCNC: 27 MMOL/L (ref 23–29)
COLOR UR: YELLOW
CREAT SERPL-MCNC: 1.3 MG/DL (ref 0.5–1.4)
DIFFERENTIAL METHOD: ABNORMAL
EOSINOPHIL # BLD AUTO: 0.4 K/UL (ref 0–0.5)
EOSINOPHIL NFR BLD: 4.3 % (ref 0–8)
ERYTHROCYTE [DISTWIDTH] IN BLOOD BY AUTOMATED COUNT: 13.2 % (ref 11.5–14.5)
EST. GFR  (NO RACE VARIABLE): >60 ML/MIN/1.73 M^2
ESTIMATED AVG GLUCOSE: 103 MG/DL (ref 68–131)
GLUCOSE SERPL-MCNC: 115 MG/DL (ref 70–110)
GLUCOSE UR QL STRIP: NEGATIVE
HBA1C MFR BLD: 5.2 % (ref 4.5–6.2)
HCT VFR BLD AUTO: 46.2 % (ref 40–54)
HDLC SERPL-MCNC: 41 MG/DL (ref 40–75)
HDLC SERPL: 18.8 % (ref 20–50)
HGB BLD-MCNC: 16.4 G/DL (ref 14–18)
HGB UR QL STRIP: ABNORMAL
IMM GRANULOCYTES # BLD AUTO: 0.09 K/UL (ref 0–0.04)
IMM GRANULOCYTES NFR BLD AUTO: 0.9 % (ref 0–0.5)
KETONES UR QL STRIP: NEGATIVE
LDLC SERPL CALC-MCNC: 127.2 MG/DL (ref 63–159)
LEUKOCYTE ESTERASE UR QL STRIP: NEGATIVE
LYMPHOCYTES # BLD AUTO: 2.2 K/UL (ref 1–4.8)
LYMPHOCYTES NFR BLD: 21.7 % (ref 18–48)
MCH RBC QN AUTO: 33.5 PG (ref 27–31)
MCHC RBC AUTO-ENTMCNC: 35.5 G/DL (ref 32–36)
MCV RBC AUTO: 94 FL (ref 82–98)
MONOCYTES # BLD AUTO: 0.8 K/UL (ref 0.3–1)
MONOCYTES NFR BLD: 8.1 % (ref 4–15)
NEUTROPHILS # BLD AUTO: 6.4 K/UL (ref 1.8–7.7)
NEUTROPHILS NFR BLD: 64 % (ref 38–73)
NITRITE UR QL STRIP: NEGATIVE
NONHDLC SERPL-MCNC: 177 MG/DL
NRBC BLD-RTO: 0 /100 WBC
PH UR STRIP: 6 [PH] (ref 5–8)
PLATELET # BLD AUTO: 172 K/UL (ref 150–450)
PMV BLD AUTO: 9.9 FL (ref 9.2–12.9)
POTASSIUM SERPL-SCNC: 3.9 MMOL/L (ref 3.5–5.1)
PROT SERPL-MCNC: 7.4 G/DL (ref 6–8.4)
PROT UR QL STRIP: ABNORMAL
RBC # BLD AUTO: 4.9 M/UL (ref 4.6–6.2)
SODIUM SERPL-SCNC: 140 MMOL/L (ref 136–145)
SP GR UR STRIP: 1.03 (ref 1–1.03)
TRIGL SERPL-MCNC: 249 MG/DL (ref 30–150)
URN SPEC COLLECT METH UR: ABNORMAL
UROBILINOGEN UR STRIP-ACNC: NEGATIVE EU/DL
WBC # BLD AUTO: 9.92 K/UL (ref 3.9–12.7)

## 2023-08-02 PROCEDURE — 81003 URINALYSIS AUTO W/O SCOPE: CPT | Performed by: NURSE PRACTITIONER

## 2023-08-02 PROCEDURE — 85025 COMPLETE CBC W/AUTO DIFF WBC: CPT | Performed by: NURSE PRACTITIONER

## 2023-08-02 PROCEDURE — 80061 LIPID PANEL: CPT | Performed by: NURSE PRACTITIONER

## 2023-08-02 PROCEDURE — 36415 COLL VENOUS BLD VENIPUNCTURE: CPT | Performed by: NURSE PRACTITIONER

## 2023-08-02 PROCEDURE — 80053 COMPREHEN METABOLIC PANEL: CPT | Performed by: NURSE PRACTITIONER

## 2023-08-02 PROCEDURE — 83036 HEMOGLOBIN GLYCOSYLATED A1C: CPT | Performed by: NURSE PRACTITIONER

## 2023-08-03 ENCOUNTER — HOSPITAL ENCOUNTER (OUTPATIENT)
Dept: RADIOLOGY | Facility: HOSPITAL | Age: 38
Discharge: HOME | End: 2023-08-03
Attending: NURSE PRACTITIONER
Payer: MEDICAID

## 2023-08-03 ENCOUNTER — TELEPHONE (OUTPATIENT)
Dept: CARDIOLOGY | Facility: CLINIC | Age: 38
End: 2023-08-03
Payer: MEDICAID

## 2023-08-03 DIAGNOSIS — Z86.718 HISTORY OF DVT OF LOWER EXTREMITY: ICD-10-CM

## 2023-08-03 DIAGNOSIS — M79.661 BILATERAL CALF PAIN: ICD-10-CM

## 2023-08-03 DIAGNOSIS — M79.662 BILATERAL CALF PAIN: ICD-10-CM

## 2023-08-03 PROCEDURE — 93970 EXTREMITY STUDY: CPT | Mod: TC

## 2023-08-03 NOTE — TELEPHONE ENCOUNTER
----- Message from Bradford Harrison sent at 8/3/2023 10:52 AM CDT -----  Contact: Self  Type:  Test Results    Who Called:  Patient  Name of Test (Lab/Mammo/Etc):  Lab  Date of Test:  8/2  Ordering Provider:  Audie  Where the test was performed:  [facility]  Best Call Back Number:  [phone]  Additional Information:

## 2023-08-03 NOTE — TELEPHONE ENCOUNTER
S/w patient and he wants you to look at labs the Dr. Bartlett ordered because he stated the meds maryam changed are not working for him and she also said something about starting diabetic shots but had to wait for levels.

## 2023-08-03 NOTE — TELEPHONE ENCOUNTER
Lvm to call back, had some questions on this message      Spoke with wife, labs are abnormal--lipid, liver, and triglycerides. Sarahy increased his metoprolol and valsartan at his visit. Hilda Bronson had discussed ozempic with him    They are wanting us to review his labs and ultrasound he had done

## 2023-08-07 PROBLEM — G47.00 INSOMNIA: Status: ACTIVE | Noted: 2023-08-07

## 2023-08-07 PROBLEM — Z86.79 HISTORY OF ATRIAL FIBRILLATION: Status: ACTIVE | Noted: 2020-08-07

## 2023-08-07 PROBLEM — F41.9 ANXIETY: Status: ACTIVE | Noted: 2023-08-07

## 2023-08-07 NOTE — ASSESSMENT & PLAN NOTE
Patient's wife concur that he has a long history of anxiety that has not been responsive to medications.  Discussion held on link between anxiety and hypertension  Counseling recommended and follow up with primary care

## 2023-08-07 NOTE — ASSESSMENT & PLAN NOTE
Patient reports history of insomnia along with sleep apnea.  Referral sent to [facility] for evaluation of both

## 2023-08-07 NOTE — ASSESSMENT & PLAN NOTE
History of Afib w/ ablation 1/2021  Continue Eliquis, magnesium oxide  as directed  Increase metoprolol to 25 mg b.i.d.

## 2023-08-07 NOTE — ASSESSMENT & PLAN NOTE
Blood pressure was high in the office today 160/108 mm Hg  Increase valsartan to 320 mg daily and monitor blood pressure at home  Follow-up for sleep study evaluation  Low-sodium diet discussion along with weight loss

## 2023-08-09 ENCOUNTER — OFFICE VISIT (OUTPATIENT)
Dept: CARDIOLOGY | Facility: CLINIC | Age: 38
End: 2023-08-09
Payer: MEDICAID

## 2023-08-09 VITALS
DIASTOLIC BLOOD PRESSURE: 88 MMHG | WEIGHT: 273.38 LBS | HEART RATE: 94 BPM | BODY MASS INDEX: 37.03 KG/M2 | OXYGEN SATURATION: 98 % | SYSTOLIC BLOOD PRESSURE: 140 MMHG | HEIGHT: 72 IN

## 2023-08-09 DIAGNOSIS — F51.01 PRIMARY INSOMNIA: ICD-10-CM

## 2023-08-09 DIAGNOSIS — I10 PRIMARY HYPERTENSION: ICD-10-CM

## 2023-08-09 DIAGNOSIS — E78.2 MIXED HYPERLIPIDEMIA: ICD-10-CM

## 2023-08-09 DIAGNOSIS — Z79.01 CURRENT USE OF LONG TERM ANTICOAGULATION: ICD-10-CM

## 2023-08-09 DIAGNOSIS — F41.9 ANXIETY: ICD-10-CM

## 2023-08-09 DIAGNOSIS — G47.33 OSA (OBSTRUCTIVE SLEEP APNEA): ICD-10-CM

## 2023-08-09 DIAGNOSIS — Z86.79 HISTORY OF ATRIAL FIBRILLATION: ICD-10-CM

## 2023-08-09 PROCEDURE — 3077F PR MOST RECENT SYSTOLIC BLOOD PRESSURE >= 140 MM HG: ICD-10-PCS | Mod: CPTII,,, | Performed by: NURSE PRACTITIONER

## 2023-08-09 PROCEDURE — 3044F PR MOST RECENT HEMOGLOBIN A1C LEVEL <7.0%: ICD-10-PCS | Mod: CPTII,,, | Performed by: NURSE PRACTITIONER

## 2023-08-09 PROCEDURE — 99214 PR OFFICE/OUTPT VISIT, EST, LEVL IV, 30-39 MIN: ICD-10-PCS | Mod: S$PBB,,, | Performed by: NURSE PRACTITIONER

## 2023-08-09 PROCEDURE — 3044F HG A1C LEVEL LT 7.0%: CPT | Mod: CPTII,,, | Performed by: NURSE PRACTITIONER

## 2023-08-09 PROCEDURE — 3008F PR BODY MASS INDEX (BMI) DOCUMENTED: ICD-10-PCS | Mod: CPTII,,, | Performed by: NURSE PRACTITIONER

## 2023-08-09 PROCEDURE — 1159F PR MEDICATION LIST DOCUMENTED IN MEDICAL RECORD: ICD-10-PCS | Mod: CPTII,,, | Performed by: NURSE PRACTITIONER

## 2023-08-09 PROCEDURE — 1160F RVW MEDS BY RX/DR IN RCRD: CPT | Mod: CPTII,,, | Performed by: NURSE PRACTITIONER

## 2023-08-09 PROCEDURE — 99999 PR PBB SHADOW E&M-EST. PATIENT-LVL III: CPT | Mod: PBBFAC,,, | Performed by: NURSE PRACTITIONER

## 2023-08-09 PROCEDURE — 3079F PR MOST RECENT DIASTOLIC BLOOD PRESSURE 80-89 MM HG: ICD-10-PCS | Mod: CPTII,,, | Performed by: NURSE PRACTITIONER

## 2023-08-09 PROCEDURE — 99213 OFFICE O/P EST LOW 20 MIN: CPT | Mod: PBBFAC,PN | Performed by: NURSE PRACTITIONER

## 2023-08-09 PROCEDURE — 3079F DIAST BP 80-89 MM HG: CPT | Mod: CPTII,,, | Performed by: NURSE PRACTITIONER

## 2023-08-09 PROCEDURE — 99999 PR PBB SHADOW E&M-EST. PATIENT-LVL III: ICD-10-PCS | Mod: PBBFAC,,, | Performed by: NURSE PRACTITIONER

## 2023-08-09 PROCEDURE — 99214 OFFICE O/P EST MOD 30 MIN: CPT | Mod: S$PBB,,, | Performed by: NURSE PRACTITIONER

## 2023-08-09 PROCEDURE — 3008F BODY MASS INDEX DOCD: CPT | Mod: CPTII,,, | Performed by: NURSE PRACTITIONER

## 2023-08-09 PROCEDURE — 1159F MED LIST DOCD IN RCRD: CPT | Mod: CPTII,,, | Performed by: NURSE PRACTITIONER

## 2023-08-09 PROCEDURE — 4010F PR ACE/ARB THEARPY RXD/TAKEN: ICD-10-PCS | Mod: CPTII,,, | Performed by: NURSE PRACTITIONER

## 2023-08-09 PROCEDURE — 1160F PR REVIEW ALL MEDS BY PRESCRIBER/CLIN PHARMACIST DOCUMENTED: ICD-10-PCS | Mod: CPTII,,, | Performed by: NURSE PRACTITIONER

## 2023-08-09 PROCEDURE — 3077F SYST BP >= 140 MM HG: CPT | Mod: CPTII,,, | Performed by: NURSE PRACTITIONER

## 2023-08-09 PROCEDURE — 4010F ACE/ARB THERAPY RXD/TAKEN: CPT | Mod: CPTII,,, | Performed by: NURSE PRACTITIONER

## 2023-08-09 NOTE — PROGRESS NOTES
Subjective:    Patient ID:  Nabil Urias is a 38 y.o. male patient here for evaluation Hypertension (Follow up 2 weeks )    History of Present Illness:  Pt in office to follow up on BP; increased medications at last visit 2 weeks ago  BP has trended down, feels better, reports relief of headaches and not seeing floaters in vision anymore  States he is bad about taking PM meds sometimes; didn't take night meds last night and /88  Still not sleeping much, has not followed up w/ sleep order center  Has tried weight loss unsuccessfully            Most Recent Echocardiogram Results  Results for orders placed during the hospital encounter of 08/18/21    Echo    Interpretation Summary  · The estimated PA systolic pressure is 36 mmHg.  · Concentric remodeling and normal systolic function.  · The estimated ejection fraction is 65%.  · Normal left ventricular diastolic function.  · Normal right ventricular size with normal right ventricular systolic function.  · Mild mitral regurgitation.  · Mild tricuspid regurgitation.      Most Recent Nuclear Stress Test Results  Results for orders placed during the hospital encounter of 03/23/23    Nuclear Stress - Cardiology Interpreted    Interpretation Summary    Normal myocardial perfusion scan. There is no evidence of myocardial ischemia or infarction.    There is a  mild intensity fixed perfusion abnormality in the inferior wall of the left ventricle secondary to diaphragm attenuation.    There is moderate apical thinning which is a normal variant.    The gated perfusion images showed an ejection fraction of 50% at rest. The gated perfusion images showed an ejection fraction of 59% post stress. Normal ejection fraction is greater than 53%.    There is normal wall motion post stress.    There is mild global hypokinesis at rest.    LV cavity size is normal at rest and normal at stress.    The ECG portion of the study is negative for ischemia.    The patient reported no chest  pain during the stress test.    There were no arrhythmias during stress.    The patient exercised for 7 minutes 5 seconds on a Jeremiah protocol, corresponding to a functional capacity of 10 METS, achieving a peak heart rate of 168 bpm, which is 91 % of the age predicted maximum heart rate.      Most Recent Cardiac PET Stress Test Results  No results found for this or any previous visit.      Most Recent Cardiovascular Angiogram results  No results found for this or any previous visit.      Other Most Recent Cardiology Results  Results for orders placed during the hospital encounter of 21    CARDIAC MONITORING STRIPS      REVIEW OF SYSTEMS: As noted in HPI   CARDIOVASCULAR: No recent chest pain, palpitations, arm/neck/jaw pain, or edema.  RESPIRATORY: No recent fever, cough, SOB.  : No blood in the urine  GI: No reflux, nausea, vomiting, or blood in stool.   MUSCULOSKELETAL: No falls.   NEURO: No headaches, syncope, or dizziness.  EYES: No sudden changes in vision.     Past Medical History:   Diagnosis Date    Atrial fibrillation     Essential (primary) hypertension     History of cardiac radiofrequency ablation     Hyperlipidemia      Past Surgical History:   Procedure Laterality Date    ABLATION OF ARRHYTHMOGENIC FOCUS FOR ATRIAL FIBRILLATION N/A 2021    Procedure: ABLATION, ARRHYTHMOGENIC FOCUS, FOR ATRIAL FIBRILLATION;  Surgeon: Daniel Handy MD;  Location: [facility] EP LAB;  Service: Cardiology;  Laterality: N/A;  AF, PVI, GABRIELA (Cx if SR), PVI, RFA, CARTO, GEN, GP, 3 PREP    CARDIOVERSION      TONSILLECTOMY       Social History     Tobacco Use    Smoking status: Former     Current packs/day: 0.00     Average packs/day: 1 pack/day for 5.0 years (5.0 ttl pk-yrs)     Types: Cigarettes     Start date:      Quit date: 2015     Years since quittin.8    Smokeless tobacco: Current     Types: Chew    Tobacco comments:     1/2 can chew qd   Substance Use Topics    Alcohol use: Yes     Alcohol/week:  4.0 standard drinks of alcohol     Types: 4 Cans of beer per week     Comment: rarely    Drug use: Yes     Frequency: 1.0 times per week     Types: Marijuana     Comment: Hx: Heroin (last use April 2012) smokes weed ocassionally          Objective      Vitals:    08/09/23 0930   BP: (!) 140/88   Pulse: 94       LAST EKG  Results for orders placed or performed in visit on 03/02/23   IN OFFICE EKG 12-LEAD (to [place])    Collection Time: 03/02/23  2:27 PM    Narrative    Test Reason : Z00.00,    Vent. Rate : 101 BPM     Atrial Rate : 101 BPM     P-R Int : 120 ms          QRS Dur : 082 ms      QT Int : 328 ms       P-R-T Axes : 035 111 -12 degrees     QTc Int : 425 ms    Sinus tachycardia  Left posterior fascicular block  Possible Inferior infarct ,age undetermined  Abnormal ECG  When compared with ECG of 18-AUG-2021 11:54,  T wave inversion now evident in Inferior leads  Confirmed by Autumn GALLEGOS, Lexa RAMSAY ([id number]) on 3/11/2023 12:49:37 PM    Referred By:  NW           Confirmed By:Lexa Leyva MD     LIPIDS - LAST 2   Lab Results   Component Value Date    CHOL 218 (H) 08/02/2023    CHOL 218 (H) 02/28/2023    HDL 41 08/02/2023    HDL 49 02/28/2023    LDLCALC 127.2 08/02/2023    LDLCALC 128.8 02/28/2023    TRIG 249 (H) 08/02/2023    TRIG 201 (H) 02/28/2023    CHOLHDL 18.8 (L) 08/02/2023    CHOLHDL 22.5 02/28/2023     CARDIAC PROFILE - LAST 2  Lab Results   Component Value Date    BNP 47 08/19/2021    BNP 20 11/07/2020    BNP 20 11/07/2020    CPK 75 08/18/2021     08/18/2021    CPKMB 2.6 08/07/2020     08/18/2021    TROPONINI <0.030 08/19/2021    TROPONINI <0.030 11/08/2020      CBC - LAST 2  Lab Results   Component Value Date    WBC 9.92 08/02/2023    WBC 10.99 02/28/2023    RBC 4.90 08/02/2023    RBC 4.97 02/28/2023    HGB 16.4 08/02/2023    HGB 16.6 02/28/2023    HCT 46.2 08/02/2023    HCT 47.1 02/28/2023     08/02/2023     02/28/2023     Lab Results   Component Value Date    LABPT 12.6  11/07/2020    INR 1.0 01/12/2021    INR 1.0 11/07/2020    APTT 28.0 01/12/2021     CHEMISTRY - LAST 2  Lab Results   Component Value Date     08/02/2023     02/28/2023    K 3.9 08/02/2023    K 4.1 02/28/2023     08/02/2023     02/28/2023    CO2 27 08/02/2023    CO2 28 02/28/2023    ANIONGAP 8 08/02/2023    ANIONGAP 8 02/28/2023    BUN 19 08/02/2023    BUN 16 02/28/2023    CREATININE 1.3 08/02/2023    CREATININE 1.2 02/28/2023     (H) 08/02/2023     (H) 02/28/2023    CALCIUM 9.2 08/02/2023    CALCIUM 9.6 02/28/2023    MG 1.9 08/19/2021    MG 1.9 11/08/2020    ALBUMIN 4.2 08/02/2023    ALBUMIN 4.5 02/28/2023    PROT 7.4 08/02/2023    PROT 8.0 02/28/2023    ALKPHOS 81 08/02/2023    ALKPHOS 71 02/28/2023    ALT 68 (H) 08/02/2023    ALT 40 02/28/2023    AST 44 (H) 08/02/2023    AST 28 02/28/2023    BILITOT 1.7 (H) 08/02/2023    BILITOT 1.0 02/28/2023      ENDOCRINE - LAST 2  Lab Results   Component Value Date    HGBA1C 5.2 08/02/2023    HGBA1C 5.1 02/28/2023    TSH 1.790 11/07/2020    TSH 1.610 08/08/2020        PHYSICAL EXAM  CONSTITUTIONAL: Well built, well nourished in no apparent distress  NECK: no carotid bruit, no JVD  LUNGS: CTA  CHEST WALL: no tenderness  HEART: regular rate and rhythm, S1, S2 normal, no murmur, click, rub or gallop   ABDOMEN: soft, non-tender; bowel sounds normal; no masses,  no organomegaly  EXTREMITIES: Extremities normal, no edema, no calf tenderness noted  NEURO: AAO X 3    I HAVE REVIEWED :    The vital signs, most recent cardiac testing, and most recent pertinent non-cardiology provider notes.    Current Outpatient Medications   Medication Instructions    ELIQUIS 5 mg, Oral, 2 times daily    magnesium oxide (MAG-OX) 400 mg, Oral, Daily    metoprolol succinate (TOPROL-XL) 25 mg, Oral, 2 times daily    valsartan (DIOVAN) 320 mg, Oral, Daily      Assessment & Plan     Hyperlipidemia  Weight loss promotion  Low cholesterol diet    Hypertension  BP improved  with prior med changes  140/88 mmHg  Hx severe insomnia, Encouraged follow up sleep study  Avoid excess caffeine, ETOH, high sodium foods    Anxiety  Ongoing issue      History of atrial fibrillation  Continue metoprolol 25 mg BID and Eliquis 5 mg BID and mag ox 400 mg daily  Hx ablation 1/2021    Current use of long term anticoagulation  Hx DVT and PE, continue Eliquis 5 mg BID; no bleeding tendencies    BMI 37.0-37.9, adult  Weight loss discussion    RAMIRO (obstructive sleep apnea)  Sleep study referral    Insomnia  Sleep disorder referral

## 2023-08-10 NOTE — ASSESSMENT & PLAN NOTE
BP improved with prior med changes  140/88 mmHg  Hx severe insomnia, Encouraged follow up sleep study  Avoid excess caffeine, ETOH, high sodium foods

## 2023-08-17 ENCOUNTER — TELEPHONE (OUTPATIENT)
Dept: CARDIOLOGY | Facility: CLINIC | Age: 38
End: 2023-08-17

## 2023-08-17 ENCOUNTER — TELEPHONE (OUTPATIENT)
Dept: CARDIOLOGY | Facility: CLINIC | Age: 38
End: 2023-08-17
Payer: MEDICAID

## 2023-08-17 ENCOUNTER — HOSPITAL ENCOUNTER (OUTPATIENT)
Facility: HOSPITAL | Age: 38
Discharge: HOME | End: 2023-08-18
Attending: EMERGENCY MEDICINE | Admitting: STUDENT IN AN ORGANIZED HEALTH CARE EDUCATION/TRAINING PROGRAM
Payer: MEDICAID

## 2023-08-17 DIAGNOSIS — R07.9 CHEST PAIN: Primary | ICD-10-CM

## 2023-08-17 PROCEDURE — 93010 EKG 12-LEAD: ICD-10-PCS | Mod: ,,, | Performed by: SPECIALIST

## 2023-08-17 PROCEDURE — 93005 ELECTROCARDIOGRAM TRACING: CPT | Performed by: SPECIALIST

## 2023-08-17 PROCEDURE — 93010 ELECTROCARDIOGRAM REPORT: CPT | Mod: ,,, | Performed by: SPECIALIST

## 2023-08-17 NOTE — TELEPHONE ENCOUNTER
----- Message from Ray Zamora sent at 8/17/2023  2:30 PM CDT -----  Type: Need Medical Advice   Who Called:Kendall, wife of patient   Best callback number:   Additional Information: wife of patient called stated her  is in the ER and she need to speak with someone in [facility] office  Please call to further assist, Thanks.

## 2023-08-17 NOTE — TELEPHONE ENCOUNTER
----- Message from Bradford Harrison sent at 8/17/2023  4:13 PM CDT -----  Contact: Spouse/Kendall  Type:  Patient Returning Call    Who Called:  Spouse/Kendall  Who Left Message for Patient:  Abby  Does the patient know what this is regarding?:  Yes  Best Call Back Number:  [phone]   Additional Information:

## 2023-08-18 ENCOUNTER — TELEPHONE (OUTPATIENT)
Dept: CARDIOLOGY | Facility: CLINIC | Age: 38
End: 2023-08-18
Payer: MEDICAID

## 2023-08-18 ENCOUNTER — CLINICAL SUPPORT (OUTPATIENT)
Dept: CARDIOLOGY | Facility: HOSPITAL | Age: 38
End: 2023-08-18
Attending: STUDENT IN AN ORGANIZED HEALTH CARE EDUCATION/TRAINING PROGRAM
Payer: MEDICAID

## 2023-08-18 VITALS
HEIGHT: 72 IN | BODY MASS INDEX: 37.25 KG/M2 | OXYGEN SATURATION: 97 % | RESPIRATION RATE: 16 BRPM | TEMPERATURE: 98 F | HEART RATE: 84 BPM | DIASTOLIC BLOOD PRESSURE: 101 MMHG | WEIGHT: 275 LBS | SYSTOLIC BLOOD PRESSURE: 168 MMHG

## 2023-08-18 VITALS — HEIGHT: 72 IN | BODY MASS INDEX: 37.25 KG/M2 | WEIGHT: 275 LBS

## 2023-08-18 PROBLEM — Z79.899 ENCOUNTER FOR MONITORING SOTALOL THERAPY: Status: RESOLVED | Noted: 2020-12-07 | Resolved: 2023-08-18

## 2023-08-18 PROBLEM — R07.9 CHEST PAIN: Status: RESOLVED | Noted: 2020-08-07 | Resolved: 2023-08-18

## 2023-08-18 PROBLEM — Z51.81 ENCOUNTER FOR MONITORING SOTALOL THERAPY: Status: RESOLVED | Noted: 2020-12-07 | Resolved: 2023-08-18

## 2023-08-18 PROBLEM — Z86.79 HISTORY OF ATRIAL FIBRILLATION: Status: RESOLVED | Noted: 2020-08-07 | Resolved: 2023-08-18

## 2023-08-18 PROBLEM — U07.1 PNEUMONIA DUE TO COVID-19 VIRUS: Status: RESOLVED | Noted: 2021-08-18 | Resolved: 2023-08-18

## 2023-08-18 PROBLEM — R29.898 WEAKNESS OF LEFT UPPER EXTREMITY: Status: ACTIVE | Noted: 2023-08-18

## 2023-08-18 PROBLEM — K21.9 GERD (GASTROESOPHAGEAL REFLUX DISEASE): Status: ACTIVE | Noted: 2023-08-18

## 2023-08-18 PROBLEM — R29.898 WEAKNESS OF LEFT UPPER EXTREMITY: Status: RESOLVED | Noted: 2023-08-18 | Resolved: 2023-08-18

## 2023-08-18 PROBLEM — I26.99 OTHER PULMONARY EMBOLISM WITHOUT ACUTE COR PULMONALE: Status: RESOLVED | Noted: 2021-09-02 | Resolved: 2023-08-18

## 2023-08-18 PROBLEM — I48.11 LONGSTANDING PERSISTENT ATRIAL FIBRILLATION: Status: RESOLVED | Noted: 2021-01-21 | Resolved: 2023-08-18

## 2023-08-18 PROBLEM — J12.82 PNEUMONIA DUE TO COVID-19 VIRUS: Status: RESOLVED | Noted: 2021-08-18 | Resolved: 2023-08-18

## 2023-08-18 PROBLEM — G45.9 TIA (TRANSIENT ISCHEMIC ATTACK): Status: ACTIVE | Noted: 2023-08-18

## 2023-08-18 LAB
ALBUMIN SERPL BCP-MCNC: 4 G/DL (ref 3.5–5.2)
ALP SERPL-CCNC: 73 U/L (ref 55–135)
ALT SERPL W/O P-5'-P-CCNC: 53 U/L (ref 10–44)
ANION GAP SERPL CALC-SCNC: 6 MMOL/L (ref 8–16)
AORTIC ROOT ANNULUS: 2.9 CM
AORTIC VALVE CUSP SEPERATION: 2 CM
APICAL FOUR CHAMBER EJECTION FRACTION: 55 %
APICAL TWO CHAMBER EJECTION FRACTION: 67 %
AST SERPL-CCNC: 35 U/L (ref 10–40)
AV INDEX (PROSTH): 0.86
AV MEAN GRADIENT: 5 MMHG
AV PEAK GRADIENT: 8 MMHG
AV VALVE AREA BY VELOCITY RATIO: 3.17 CM²
AV VALVE AREA: 2.99 CM²
AV VELOCITY RATIO: 0.92
BASOPHILS # BLD AUTO: 0.12 K/UL (ref 0–0.2)
BASOPHILS NFR BLD: 1.1 % (ref 0–1.9)
BILIRUB SERPL-MCNC: 1 MG/DL (ref 0.1–1)
BNP SERPL-MCNC: 12 PG/ML (ref 0–99)
BSA FOR ECHO PROCEDURE: 2.52 M2
BUN SERPL-MCNC: 19 MG/DL (ref 6–20)
CALCIUM SERPL-MCNC: 9 MG/DL (ref 8.7–10.5)
CHLORIDE SERPL-SCNC: 108 MMOL/L (ref 95–110)
CO2 SERPL-SCNC: 24 MMOL/L (ref 23–29)
CREAT SERPL-MCNC: 1.4 MG/DL (ref 0.5–1.4)
CV ECHO LV RWT: 0.4 CM
D DIMER PPP IA.FEU-MCNC: 0.19 MG/L FEU
DIFFERENTIAL METHOD: ABNORMAL
DOP CALC AO PEAK VEL: 1.43 M/S
DOP CALC AO VTI: 27.7 CM
DOP CALC LVOT AREA: 3.5 CM2
DOP CALC LVOT DIAMETER: 2.1 CM
DOP CALC LVOT PEAK VEL: 1.31 M/S
DOP CALC LVOT STROKE VOLUME: 82.74 CM3
DOP CALC MV VTI: 23.7 CM
DOP CALCLVOT PEAK VEL VTI: 23.9 CM
E WAVE DECELERATION TIME: 180 MSEC
E/A RATIO: 1.56
E/E' RATIO: 9.25 M/S
ECHO LV POSTERIOR WALL: 0.93 CM (ref 0.6–1.1)
EOSINOPHIL # BLD AUTO: 0.6 K/UL (ref 0–0.5)
EOSINOPHIL NFR BLD: 5.5 % (ref 0–8)
ERYTHROCYTE [DISTWIDTH] IN BLOOD BY AUTOMATED COUNT: 12.8 % (ref 11.5–14.5)
EST. GFR  (NO RACE VARIABLE): >60 ML/MIN/1.73 M^2
FRACTIONAL SHORTENING: 30 % (ref 28–44)
GLUCOSE SERPL-MCNC: 103 MG/DL (ref 70–110)
HCT VFR BLD AUTO: 44 % (ref 40–54)
HGB BLD-MCNC: 15.8 G/DL (ref 14–18)
IMM GRANULOCYTES # BLD AUTO: 0.1 K/UL (ref 0–0.04)
IMM GRANULOCYTES NFR BLD AUTO: 0.9 % (ref 0–0.5)
INR PPP: 1 (ref 0.8–1.2)
INTERVENTRICULAR SEPTUM: 0.92 CM (ref 0.6–1.1)
IVC DIAMETER: 1.73 CM
LEFT ATRIUM AREA SYSTOLIC (APICAL 2 CHAMBER): 17.1 CM2
LEFT ATRIUM AREA SYSTOLIC (APICAL 4 CHAMBER): 22.2 CM2
LEFT ATRIUM VOLUME INDEX MOD: 21.5 ML/M2
LEFT ATRIUM VOLUME MOD: 52.4 CM3
LEFT INTERNAL DIMENSION IN SYSTOLE: 3.27 CM (ref 2.1–4)
LEFT VENTRICLE DIASTOLIC VOLUME INDEX: 41.8 ML/M2
LEFT VENTRICLE DIASTOLIC VOLUME: 102 ML
LEFT VENTRICLE END DIASTOLIC VOLUME APICAL 2 CHAMBER: 102 ML
LEFT VENTRICLE END DIASTOLIC VOLUME APICAL 4 CHAMBER: 86.9 ML
LEFT VENTRICLE END SYSTOLIC VOLUME APICAL 4 CHAMBER: 38.8 ML
LEFT VENTRICLE MASS INDEX: 61 G/M2
LEFT VENTRICLE SYSTOLIC VOLUME INDEX: 17.7 ML/M2
LEFT VENTRICLE SYSTOLIC VOLUME: 43.2 ML
LEFT VENTRICULAR INTERNAL DIMENSION IN DIASTOLE: 4.7 CM (ref 3.5–6)
LEFT VENTRICULAR MASS: 148.02 G
LV LATERAL E/E' RATIO: 9.25 M/S
LV SEPTAL E/E' RATIO: 9.25 M/S
LVED V (TEICH): 102 ML
LVES V (TEICH): 43.2 ML
LVOT MG: 3 MMHG
LVOT MV: 0.83 CM/S
LYMPHOCYTES # BLD AUTO: 2.7 K/UL (ref 1–4.8)
LYMPHOCYTES NFR BLD: 24.2 % (ref 18–48)
Lab: 33.5
MCH RBC QN AUTO: 33.2 PG (ref 27–31)
MCHC RBC AUTO-ENTMCNC: 35.9 G/DL (ref 32–36)
MCV RBC AUTO: 92 FL (ref 82–98)
MONOCYTES # BLD AUTO: 0.8 K/UL (ref 0.3–1)
MONOCYTES NFR BLD: 7 % (ref 4–15)
MV MEAN GRADIENT: 2 MMHG
MV PEAK A VEL: 0.71 M/S
MV PEAK E VEL: 1.11 M/S
MV PEAK GRADIENT: 5 MMHG
MV STENOSIS PRESSURE HALF TIME: 81 MS
MV VALVE AREA BY CONTINUITY EQUATION: 3.49 CM2
MV VALVE AREA P 1/2 METHOD: 2.72 CM2
NEUTROPHILS # BLD AUTO: 6.8 K/UL (ref 1.8–7.7)
NEUTROPHILS NFR BLD: 61.3 % (ref 38–73)
NRBC BLD-RTO: 0 /100 WBC
OHS LV EJECTION FRACTION SIMPSONS BIPLANE MOD: 63 %
PLATELET # BLD AUTO: 182 K/UL (ref 150–450)
PMV BLD AUTO: 9.8 FL (ref 9.2–12.9)
POTASSIUM SERPL-SCNC: 3.8 MMOL/L (ref 3.5–5.1)
PROT SERPL-MCNC: 7 G/DL (ref 6–8.4)
PROTHROMBIN TIME: 10.9 SEC (ref 9–12.5)
PV MV: 0.82 M/S
PV PEAK GRADIENT: 5 MMHG
PV PEAK VELOCITY: 1.14 M/S
RA PRESSURE ESTIMATED: 3 MMHG
RA PRESSURE: 3 MMHG
RBC # BLD AUTO: 4.76 M/UL (ref 4.6–6.2)
RIGHT ATRIAL AREA: 11 CM2
RIGHT ATRIUM VOLUME AREA LENGTH APICAL 4 CHAMBER: 20.4 ML
RV TISSUE DOPPLER FREE WALL SYSTOLIC VELOCITY 1 (APICAL 4 CHAMBER VIEW): 10.7 CM/S
SODIUM SERPL-SCNC: 138 MMOL/L (ref 136–145)
TDI LATERAL: 0.12 M/S
TDI SEPTAL: 0.12 M/S
TDI: 0.12 M/S
TRICUSPID ANNULAR PLANE SYSTOLIC EXCURSION: 1.78 CM
TROPONIN I SERPL HS-MCNC: 7.2 PG/ML (ref 0–14.9)
TROPONIN I SERPL HS-MCNC: 7.8 PG/ML (ref 0–14.9)
TROPONIN I SERPL HS-MCNC: 8.2 PG/ML (ref 0–14.9)
WBC # BLD AUTO: 11.09 K/UL (ref 3.9–12.7)
Z-SCORE OF LEFT VENTRICULAR DIMENSION IN END DIASTOLE: -9.37
Z-SCORE OF LEFT VENTRICULAR DIMENSION IN END SYSTOLE: -6.19

## 2023-08-18 PROCEDURE — 96374 THER/PROPH/DIAG INJ IV PUSH: CPT | Mod: 59

## 2023-08-18 PROCEDURE — 36415 COLL VENOUS BLD VENIPUNCTURE: CPT | Performed by: STUDENT IN AN ORGANIZED HEALTH CARE EDUCATION/TRAINING PROGRAM

## 2023-08-18 PROCEDURE — 93306 TTE W/DOPPLER COMPLETE: CPT

## 2023-08-18 PROCEDURE — 63600175 PHARM REV CODE 636 W HCPCS: Performed by: EMERGENCY MEDICINE

## 2023-08-18 PROCEDURE — 85610 PROTHROMBIN TIME: CPT

## 2023-08-18 PROCEDURE — 80053 COMPREHEN METABOLIC PANEL: CPT

## 2023-08-18 PROCEDURE — 83880 ASSAY OF NATRIURETIC PEPTIDE: CPT

## 2023-08-18 PROCEDURE — G0378 HOSPITAL OBSERVATION PER HR: HCPCS

## 2023-08-18 PROCEDURE — 25000003 PHARM REV CODE 250: Performed by: STUDENT IN AN ORGANIZED HEALTH CARE EDUCATION/TRAINING PROGRAM

## 2023-08-18 PROCEDURE — 85379 FIBRIN DEGRADATION QUANT: CPT | Performed by: STUDENT IN AN ORGANIZED HEALTH CARE EDUCATION/TRAINING PROGRAM

## 2023-08-18 PROCEDURE — 99214 PR OFFICE/OUTPT VISIT, EST, LEVL IV, 30-39 MIN: ICD-10-PCS | Mod: FS,25,, | Performed by: SPECIALIST

## 2023-08-18 PROCEDURE — 93306 ECHO (CUPID ONLY): ICD-10-PCS | Mod: 26,,, | Performed by: SPECIALIST

## 2023-08-18 PROCEDURE — 99285 EMERGENCY DEPT VISIT HI MDM: CPT | Mod: 25

## 2023-08-18 PROCEDURE — 25000003 PHARM REV CODE 250: Performed by: NURSE PRACTITIONER

## 2023-08-18 PROCEDURE — 93306 TTE W/DOPPLER COMPLETE: CPT | Mod: 26,,, | Performed by: SPECIALIST

## 2023-08-18 PROCEDURE — 96375 TX/PRO/DX INJ NEW DRUG ADDON: CPT | Mod: 59

## 2023-08-18 PROCEDURE — 99214 OFFICE O/P EST MOD 30 MIN: CPT | Mod: FS,25,, | Performed by: SPECIALIST

## 2023-08-18 PROCEDURE — 63600175 PHARM REV CODE 636 W HCPCS: Performed by: INTERNAL MEDICINE

## 2023-08-18 PROCEDURE — 84484 ASSAY OF TROPONIN QUANT: CPT | Mod: 91 | Performed by: EMERGENCY MEDICINE

## 2023-08-18 PROCEDURE — 84484 ASSAY OF TROPONIN QUANT: CPT

## 2023-08-18 PROCEDURE — 85025 COMPLETE CBC W/AUTO DIFF WBC: CPT

## 2023-08-18 PROCEDURE — 84484 ASSAY OF TROPONIN QUANT: CPT | Mod: 91 | Performed by: STUDENT IN AN ORGANIZED HEALTH CARE EDUCATION/TRAINING PROGRAM

## 2023-08-18 RX ORDER — LABETALOL HYDROCHLORIDE 5 MG/ML
10 INJECTION, SOLUTION INTRAVENOUS EVERY 4 HOURS PRN
Status: DISCONTINUED | OUTPATIENT
Start: 2023-08-18 | End: 2023-08-18 | Stop reason: HOSPADM

## 2023-08-18 RX ORDER — METOPROLOL TARTRATE 25 MG/1
25 TABLET, FILM COATED ORAL DAILY
Qty: 60 TABLET | Refills: 1 | Status: SHIPPED | OUTPATIENT
Start: 2023-08-18 | End: 2023-09-06

## 2023-08-18 RX ORDER — AMLODIPINE BESYLATE 5 MG/1
5 TABLET ORAL DAILY
Qty: 30 TABLET | Refills: 2 | Status: SHIPPED | OUTPATIENT
Start: 2023-08-19 | End: 2024-01-04 | Stop reason: SDUPTHER

## 2023-08-18 RX ORDER — VALSARTAN 80 MG/1
320 TABLET ORAL DAILY
Status: DISCONTINUED | OUTPATIENT
Start: 2023-08-18 | End: 2023-08-18 | Stop reason: HOSPADM

## 2023-08-18 RX ORDER — ASPIRIN 81 MG/1
81 TABLET ORAL DAILY
Qty: 30 TABLET | Refills: 2 | Status: SHIPPED | OUTPATIENT
Start: 2023-08-18 | End: 2024-01-04 | Stop reason: SDUPTHER

## 2023-08-18 RX ORDER — ASPIRIN 325 MG
325 TABLET ORAL ONCE
Status: COMPLETED | OUTPATIENT
Start: 2023-08-18 | End: 2023-08-18

## 2023-08-18 RX ORDER — METOPROLOL TARTRATE 25 MG/1
25 TABLET, FILM COATED ORAL 2 TIMES DAILY
Qty: 60 TABLET | Refills: 11 | Status: SHIPPED | OUTPATIENT
Start: 2023-08-18 | End: 2024-08-17

## 2023-08-18 RX ORDER — SODIUM CHLORIDE 0.9 % (FLUSH) 0.9 %
10 SYRINGE (ML) INJECTION
Status: DISCONTINUED | OUTPATIENT
Start: 2023-08-18 | End: 2023-08-18 | Stop reason: HOSPADM

## 2023-08-18 RX ORDER — HYDROCHLOROTHIAZIDE 12.5 MG/1
12.5 TABLET ORAL DAILY
Qty: 30 TABLET | Refills: 2 | Status: SHIPPED | OUTPATIENT
Start: 2023-08-19 | End: 2024-01-04 | Stop reason: SDUPTHER

## 2023-08-18 RX ORDER — IBUPROFEN 200 MG
200 TABLET ORAL EVERY 6 HOURS PRN
COMMUNITY

## 2023-08-18 RX ORDER — ASPIRIN 81 MG/1
81 TABLET ORAL DAILY
Status: DISCONTINUED | OUTPATIENT
Start: 2023-08-19 | End: 2023-08-18 | Stop reason: HOSPADM

## 2023-08-18 RX ORDER — FENOFIBRATE 160 MG/1
160 TABLET ORAL DAILY
Qty: 30 TABLET | Refills: 2 | Status: SHIPPED | OUTPATIENT
Start: 2023-08-18 | End: 2023-11-24

## 2023-08-18 RX ORDER — HYDROCHLOROTHIAZIDE 12.5 MG/1
12.5 TABLET ORAL DAILY
Status: DISCONTINUED | OUTPATIENT
Start: 2023-08-18 | End: 2023-08-18 | Stop reason: HOSPADM

## 2023-08-18 RX ORDER — METOPROLOL SUCCINATE 25 MG/1
25 TABLET, EXTENDED RELEASE ORAL 2 TIMES DAILY
Status: DISCONTINUED | OUTPATIENT
Start: 2023-08-18 | End: 2023-08-18 | Stop reason: HOSPADM

## 2023-08-18 RX ORDER — ACETAMINOPHEN 325 MG/1
650 TABLET ORAL EVERY 4 HOURS PRN
Status: DISCONTINUED | OUTPATIENT
Start: 2023-08-18 | End: 2023-08-18 | Stop reason: HOSPADM

## 2023-08-18 RX ORDER — PANTOPRAZOLE SODIUM 40 MG/1
40 TABLET, DELAYED RELEASE ORAL
Status: DISCONTINUED | OUTPATIENT
Start: 2023-08-18 | End: 2023-08-18 | Stop reason: HOSPADM

## 2023-08-18 RX ORDER — ATORVASTATIN CALCIUM 40 MG/1
40 TABLET, FILM COATED ORAL NIGHTLY
Status: DISCONTINUED | OUTPATIENT
Start: 2023-08-18 | End: 2023-08-18 | Stop reason: HOSPADM

## 2023-08-18 RX ORDER — ONDANSETRON 2 MG/ML
4 INJECTION INTRAMUSCULAR; INTRAVENOUS EVERY 8 HOURS PRN
Status: DISCONTINUED | OUTPATIENT
Start: 2023-08-18 | End: 2023-08-18 | Stop reason: HOSPADM

## 2023-08-18 RX ORDER — HYDRALAZINE HYDROCHLORIDE 20 MG/ML
5 INJECTION INTRAMUSCULAR; INTRAVENOUS
Status: COMPLETED | OUTPATIENT
Start: 2023-08-18 | End: 2023-08-18

## 2023-08-18 RX ORDER — ENOXAPARIN SODIUM 100 MG/ML
1 INJECTION SUBCUTANEOUS EVERY 12 HOURS
Status: DISCONTINUED | OUTPATIENT
Start: 2023-08-18 | End: 2023-08-18 | Stop reason: HOSPADM

## 2023-08-18 RX ORDER — AMLODIPINE BESYLATE 5 MG/1
5 TABLET ORAL DAILY
Status: DISCONTINUED | OUTPATIENT
Start: 2023-08-18 | End: 2023-08-18 | Stop reason: HOSPADM

## 2023-08-18 RX ADMIN — HYDROCHLOROTHIAZIDE 12.5 MG: 12.5 TABLET ORAL at 12:08

## 2023-08-18 RX ADMIN — METOPROLOL SUCCINATE 25 MG: 25 TABLET, FILM COATED, EXTENDED RELEASE ORAL at 09:08

## 2023-08-18 RX ADMIN — HYDRALAZINE HYDROCHLORIDE 5 MG: 20 INJECTION INTRAMUSCULAR; INTRAVENOUS at 12:08

## 2023-08-18 RX ADMIN — LABETALOL HYDROCHLORIDE 10 MG: 5 INJECTION, SOLUTION INTRAVENOUS at 06:08

## 2023-08-18 RX ADMIN — AMLODIPINE BESYLATE 5 MG: 5 TABLET ORAL at 12:08

## 2023-08-18 RX ADMIN — ASPIRIN 325 MG: 325 TABLET ORAL at 06:08

## 2023-08-18 RX ADMIN — VALSARTAN 320 MG: 80 TABLET, FILM COATED ORAL at 09:08

## 2023-08-18 RX ADMIN — PANTOPRAZOLE SODIUM 40 MG: 40 TABLET, DELAYED RELEASE ORAL at 06:08

## 2023-08-18 NOTE — ASSESSMENT & PLAN NOTE
Troponins x2 are negative.  We will repeat later this AM.  EKG normal sinus rhythm, no ST elevations.  Patient is on Eliquis for AFib and history of PE.  Currently 98% on room air.  Monitor on telemetry.  Cardiology consulted.  We will start aspirin and continue anticoagulation and statin.  We will check D-dimer and echo.

## 2023-08-18 NOTE — PROGRESS NOTES
"[facility] Medicine  Progress Note    Patient Name: Nabil Urias  MRN: [id number]  Patient Class: OP- Observation   Admission Date: 8/17/2023  Length of Stay: 0 days  Attending Physician: Ankit Simmons MD  Primary Care Provider: Hilda Bronson FNP-C        Subjective:     Principal Problem:Chest pain        HPI:  Patient is a 38-year-old male with hypertension, hyperlipidemia, AFib on Eliquis, and hx of PE who presents with chest pain.  Chest pain began yesterday evening while at rest.  Pain is moderate in nature and located right upper chest.  Does not radiate.  Did have some associated shortness of breath at that time.  Chest pain has now resolved.  Patient was seen earlier today in the ED for TIA like symptoms.  At that time, patient was having left upper extremity numbness and weakness.  Patient denies any numbness or weakness now.  Patient reports compliance with his antihypertensives.  Patient is afebrile with a initial blood pressure of 184/114.  98% on room air. WBC 11.0 and hemoglobin 15.8.  Creatinine 1.4.  Troponin 7.8 and BNP 12.  EKG normal sinus rhythm, no ST elevations.      Overview/Hospital Course:  Nabil Urias is a 38 year old male with a past medical history of Afib, HTN, HLD, obesity and GERD who presented with chest pain in the setting of hypertensive urgency. Troponin levels are unremarkable. EKG shows TWI in lead III. A TTE is pending. Cardiology has been consulted.      Interval History: see "Hospital Course"    Review of Systems   Respiratory:  Positive for shortness of breath.    Cardiovascular:  Positive for chest pain.     Objective:     Vital Signs (Most Recent):  Temp: 98.1 °F (36.7 °C) (08/18/23 1055)  Pulse: 78 (08/18/23 1055)  Resp: 16 (08/18/23 1055)  BP: (!) 143/81 (08/18/23 1027)  SpO2: 97 % (08/18/23 1055) Vital Signs (24h Range):  Temp:  [97.4 °F (36.3 °C)-98.4 °F (36.9 °C)] 98.1 °F (36.7 °C)  Pulse:  [71-94] 78  Resp:  [16-19] 16  SpO2:  [95 %-98 " %] 97 %  BP: (135-185)/() 143/81     Weight: 124.7 kg (275 lb)  Body mass index is 37.3 kg/m².    Intake/Output Summary (Last 24 hours) at 8/18/2023 1218  Last data filed at 8/18/2023 0735  Gross per 24 hour   Intake 0 ml   Output --   Net 0 ml         Physical Exam  Vitals and nursing note reviewed.   Constitutional:       General: He is not in acute distress.     Appearance: He is obese.   HENT:      Head: Normocephalic and atraumatic.      Right Ear: External ear normal.      Left Ear: External ear normal.      Nose: Nose normal.      Mouth/Throat:      Mouth: Mucous membranes are moist.      Pharynx: Oropharynx is clear.   Eyes:      Extraocular Movements: Extraocular movements intact.      Conjunctiva/sclera: Conjunctivae normal.   Cardiovascular:      Rate and Rhythm: Normal rate and regular rhythm.      Pulses: Normal pulses.      Heart sounds: Normal heart sounds.   Pulmonary:      Effort: Pulmonary effort is normal.      Breath sounds: Normal breath sounds.   Abdominal:      General: Bowel sounds are normal.      Palpations: Abdomen is soft.   Musculoskeletal:         General: Normal range of motion.      Cervical back: Normal range of motion and neck supple.   Skin:     General: Skin is warm and dry.   Neurological:      Mental Status: Mental status is at baseline.   Psychiatric:         Mood and Affect: Mood normal.         Behavior: Behavior normal.             Significant Labs: All pertinent labs within the past 24 hours have been reviewed.    Significant Imaging: I have reviewed all pertinent imaging results/findings within the past 24 hours.      Assessment/Plan:      * Chest pain  D-dimer negative. Troponin negative. EKG with TWI at lead III.  -TTE  -Cardiology consulted  -Telemetry  -ASA  -Statin  -Metoprolol        GERD (gastroesophageal reflux disease)  -PPI      BMI 37.0-37.9, adult  Body mass index is 37.3 kg/m². Morbid obesity complicates all aspects of disease management from diagnostic  modalities to treatment.     Hypertension  -Amlodipine  -Metoprolol  -HCTZ  -Valsartan  -Continue to monitor    Hyperlipidemia  Continue statin      Paroxysmal atrial fibrillation  -Telemetry  -Metoprolol  -Lovenox      VTE Risk Mitigation (From admission, onward)         Ordered     enoxaparin injection 120 mg  Every 12 hours         08/18/23 0356     IP VTE HIGH RISK PATIENT  Once         08/18/23 0353     Place sequential compression device  Until discontinued         08/18/23 0353                Discharge Planning   NURYS: 8/19/2023    Code Status: Full Code   Is the patient medically ready for discharge?:     Reason for patient still in hospital (select all that apply): Patient trending condition, Imaging and Consult recommendations  Discharge Plan A: Home with family                  Ankit Simmons MD  Department of Hospital Medicine   [facility]

## 2023-08-18 NOTE — H&P
[facility] - Emergency Dept  Hospital Medicine  History & Physical    Patient Name: Nabil Urias  MRN: [id number]  Patient Class: OP- Observation  Admission Date: 8/17/2023  Attending Physician: Abelardo Vidales MD   Primary Care Provider: Hilda Bronson FNP-C         Patient information was obtained from patient, past medical records and ER records.     Subjective:     Principal Problem:Chest pain    Chief Complaint:   Chief Complaint   Patient presents with    Chest Pain     Patient c/o chest pain x 30 min. Was seen here today.     Headache        HPI: Patient is a 38-year-old male with hypertension, hyperlipidemia, AFib on Eliquis, and hx of PE who presents with chest pain.  Chest pain began yesterday evening while at rest.  Pain is moderate in nature and located right upper chest.  Does not radiate.  Did have some associated shortness of breath at that time.  Chest pain has now resolved.  Patient was seen earlier today in the ED for TIA like symptoms.  At that time, patient was having left upper extremity numbness and weakness.  Patient denies any numbness or weakness now.  Patient reports compliance with his antihypertensives.  Patient is afebrile with a initial blood pressure of 184/114.  98% on room air. WBC 11.0 and hemoglobin 15.8.  Creatinine 1.4.  Troponin 7.8 and BNP 12.  EKG normal sinus rhythm, no ST elevations.      Past Medical History:   Diagnosis Date    Atrial fibrillation     Essential (primary) hypertension     History of cardiac radiofrequency ablation     Hyperlipidemia        Past Surgical History:   Procedure Laterality Date    ABLATION OF ARRHYTHMOGENIC FOCUS FOR ATRIAL FIBRILLATION N/A 1/21/2021    Procedure: ABLATION, ARRHYTHMOGENIC FOCUS, FOR ATRIAL FIBRILLATION;  Surgeon: Daniel Handy MD;  Location: [facility];  Service: Cardiology;  Laterality: N/A;  AF, PVI, GABRIELA (Cx if SR), PVI, RFA, CARTO, GEN, GP, 3 PREP    CARDIOVERSION      TONSILLECTOMY  1990       Review of  patient's allergies indicates:  No Known Allergies    No current facility-administered medications on file prior to encounter.     Current Outpatient Medications on File Prior to Encounter   Medication Sig    apixaban (ELIQUIS) 5 mg Tab Take 1 tablet (5 mg total) by mouth 2 (two) times daily.    atorvastatin (LIPITOR) 40 MG tablet Take 1 tablet (40 mg total) by mouth once daily.    magnesium oxide (MAG-OX) 400 mg (241.3 mg magnesium) tablet Take 1 tablet (400 mg total) by mouth once daily.    metoprolol succinate (TOPROL-XL) 25 MG 24 hr tablet Take 1 tablet (25 mg total) by mouth 2 (two) times daily.    omeprazole (PRILOSEC) 20 MG capsule Take 20 mg by mouth once daily.    valsartan (DIOVAN) 320 MG tablet Take 1 tablet (320 mg total) by mouth once daily.     Family History       Problem Relation (Age of Onset)    Heart attack Maternal Grandfather          Tobacco Use    Smoking status: Former     Current packs/day: 0.00     Average packs/day: 1 pack/day for 5.0 years (5.0 ttl pk-yrs)     Types: Cigarettes     Start date:      Quit date: 2015     Years since quittin.9    Smokeless tobacco: Current     Types: Chew    Tobacco comments:     1/2 can chew qd   Substance and Sexual Activity    Alcohol use: Yes     Alcohol/week: 4.0 standard drinks of alcohol     Types: 4 Cans of beer per week     Comment: rarely    Drug use: Yes     Frequency: 1.0 times per week     Types: Marijuana     Comment: Hx: Heroin (last use 2012) smokes weed ocassionally     Sexual activity: Yes     Partners: Female     Review of Systems   Constitutional: Negative.    HENT: Negative.     Eyes: Negative.    Respiratory:  Positive for shortness of breath. Negative for cough, wheezing and stridor.    Cardiovascular:  Positive for chest pain. Negative for palpitations and leg swelling.   Gastrointestinal: Negative.    Endocrine: Negative.    Genitourinary: Negative.    Musculoskeletal: Negative.    Skin: Negative.    Neurological:  Negative.    Hematological: Negative.    Psychiatric/Behavioral: Negative.       Objective:     Vital Signs (Most Recent):  Temp: 98 °F (36.7 °C) (08/17/23 2316)  Pulse: 78 (08/18/23 0231)  Resp: 17 (08/18/23 0236)  BP: (!) 170/91 (08/18/23 0231)  SpO2: 96 % (08/18/23 0231) Vital Signs (24h Range):  Temp:  [98 °F (36.7 °C)] 98 °F (36.7 °C)  Pulse:  [] 78  Resp:  [17-19] 17  SpO2:  [95 %-99 %] 96 %  BP: (135-184)/() 170/91     Weight: 122.5 kg (270 lb)  Body mass index is 36.62 kg/m².     Physical Exam  Vitals and nursing note reviewed.   Constitutional:       Appearance: Normal appearance.   HENT:      Head: Normocephalic and atraumatic.      Nose: Nose normal.   Eyes:      Extraocular Movements: Extraocular movements intact.      Conjunctiva/sclera: Conjunctivae normal.      Pupils: Pupils are equal, round, and reactive to light.   Cardiovascular:      Rate and Rhythm: Normal rate and regular rhythm.      Heart sounds: No murmur heard.  Pulmonary:      Effort: Pulmonary effort is normal. No respiratory distress.      Breath sounds: Normal breath sounds. No stridor. No wheezing or rales.   Abdominal:      General: Abdomen is flat. There is no distension.      Palpations: Abdomen is soft.      Tenderness: There is no abdominal tenderness.   Musculoskeletal:         General: No swelling, tenderness or deformity. Normal range of motion.      Cervical back: Normal range of motion and neck supple.      Right lower leg: No edema.      Left lower leg: No edema.   Skin:     General: Skin is warm and dry.      Coloration: Skin is not jaundiced.   Neurological:      General: No focal deficit present.      Mental Status: He is alert and oriented to person, place, and time.   Psychiatric:         Mood and Affect: Mood normal.         Behavior: Behavior normal.         Thought Content: Thought content normal.         Judgment: Judgment normal.              CRANIAL NERVES     CN III, IV, VI   Pupils are equal, round,  and reactive to light.       Significant Labs: All pertinent labs within the past 24 hours have been reviewed.  Recent Lab Results         08/18/23  0230   08/18/23  0042   08/17/23  1243        Albumin   4.0   4.1       Alkaline Phosphatase   73   82       ALT   53   57       Anion Gap   6   8       AST   35   39       Baso #   0.12   0.11       Basophil %   1.1   1.1       BILIRUBIN TOTAL   1.0  Comment: For infants and newborns, interpretation of results should be based  on gestational age, weight and in agreement with clinical  observations.    Premature Infant recommended reference ranges:  Up to 24 hours.............<8.0 mg/dL  Up to 48 hours............<12.0 mg/dL  3-5 days..................<15.0 mg/dL  6-29 days.................<15.0 mg/dL     0.9  Comment: For infants and newborns, interpretation of results should be based  on gestational age, weight and in agreement with clinical  observations.    Premature Infant recommended reference ranges:  Up to 24 hours.............<8.0 mg/dL  Up to 48 hours............<12.0 mg/dL  3-5 days..................<15.0 mg/dL  6-29 days.................<15.0 mg/dL         BNP   12  Comment: Values of less than 100 pg/ml are consistent with non-CHF populations.   13  Comment: Values of less than 100 pg/ml are consistent with non-CHF populations.       BUN   19   18       Calcium   9.0   9.0       Chloride   108   105       Cholesterol     235  Comment: The National Cholesterol Education Program (NCEP) has set the  following guidelines (reference ranges) for Cholesterol:  Optimal.....................<200 mg/dL  Borderline High.............200-239 mg/dL  High........................> or = 240 mg/dL         CO2   24   26       Creatinine   1.4   1.3       Differential Method   Automated   Automated       eGFR   >60.0   >60.0       Eos #   0.6   0.4       Eosinophil %   5.5   3.8       Estimated Avg Glucose     103       Glucose   103   105       Gran # (ANC)   6.8   6.7        Gran %   61.3   66.3       HDL     48  Comment: The National Cholesterol Education Program (NCEP) has set the  following guidelines (reference values) for HDL Cholesterol:  Low...............<40 mg/dL  Optimal...........>60 mg/dL         HDL/Cholesterol Ratio     20.4       Hematocrit   44.0   45.0       Hemoglobin   15.8   16.3       Hemoglobin A1C External     5.2  Comment: According to ADA guidelines, hemoglobin A1C <7.0% represents  optimal control in non-pregnant diabetic patients.  Different  metrics may apply to specific populations.   Standards of Medical Care in Diabetes - 2016.    For the purpose of screening for the presence of diabetes:  <5.7%     Consistent with the absence of diabetes  5.7-6.4%  Consistent with increasing risk for diabetes   (prediabetes)  >or=6.5%  Consistent with diabetes    Currently no consensus exists for use of hemoglobin A1C  for diagnosis of diabetes for children.         Immature Grans (Abs)   0.10  Comment: Mild elevation in immature granulocytes is non specific and   can be seen in a variety of conditions including stress response,   acute inflammation, trauma and pregnancy. Correlation with other   laboratory and clinical findings is essential.     0.11  Comment: Mild elevation in immature granulocytes is non specific and   can be seen in a variety of conditions including stress response,   acute inflammation, trauma and pregnancy. Correlation with other   laboratory and clinical findings is essential.         Immature Granulocytes   0.9   1.1       INR   1.0  Comment: Coumadin Therapy:  2.0 - 3.0 for INR for all indicators except mechanical heart valves  and antiphospholipid syndromes which should use 2.5 - 3.5.           LDL Cholesterol External     Invalid, Trig>400.0  Comment: The National Cholesterol Education Program (NCEP) has set the  following guidelines (reference values) for LDL Cholesterol:  Optimal.......................<130 mg/dL  Borderline  High...............130-159 mg/dL  High..........................160-189 mg/dL  Very High.....................>190 mg/dL         Lymph #   2.7   2.0       Lymph %   24.2   20.3       Magnesium     2.2       MCH   33.2   33.6       MCHC   35.9   36.2       MCV   92   93       Mono #   0.8   0.7       Mono %   7.0   7.4       MPV   9.8   10.4       Non-HDL Cholesterol     187  Comment: Risk category and Non-HDL cholesterol goals:  Coronary heart disease (CHD)or equivalent (10-year risk of CHD >20%):  Non-HDL cholesterol goal     <130 mg/dL  Two or more CHD risk factors and 10-year risk of CHD <= 20%:  Non-HDL cholesterol goal     <160 mg/dL  0 to 1 CHD risk factor:  Non-HDL cholesterol goal     <190 mg/dL         nRBC   0   0       Platelets   182   202       Potassium   3.8   3.8       PROTEIN TOTAL   7.0   7.1       Protime   10.9         RBC   4.76   4.85       RDW   12.8   12.7       Sodium   138   139       Total Cholesterol/HDL Ratio     4.9       Triglycerides     579  Comment: The National Cholesterol Education Program (NCEP) has set the  following guidelines (reference values) for triglycerides:  Normal......................<150 mg/dL  Borderline High.............150-199 mg/dL  High........................200-499 mg/dL         Troponin I High Sensitivity 7.8  Comment: Troponin results differ between methods. Do not use   results between Troponin methods interchangeably.    Alkaline Phospatase levels above 400 U/L may   cause false positive results.    Access hsTnI should not be used for patients taking   Asfotase lore (Strensiq).     8.2  Comment: Troponin results differ between methods. Do not use   results between Troponin methods interchangeably.    Alkaline Phospatase levels above 400 U/L may   cause false positive results.    Access hsTnI should not be used for patients taking   Asfotase lore (Strensiq).     6.3  Comment: Troponin results differ between methods. Do not use   results between Troponin  methods interchangeably.    Alkaline Phospatase levels above 400 U/L may   cause false positive results.    Access hsTnI should not be used for patients taking   Asfotase lore (Strensiq).         WBC   11.09   10.03               Significant Imaging: I have reviewed all pertinent imaging results/findings within the past 24 hours.    Assessment/Plan:     * Chest pain  Troponins x2 are negative.  We will repeat later this AM.  EKG normal sinus rhythm, no ST elevations.  Patient is on Eliquis for AFib and history of PE.  Currently 98% on room air.  Stress test in March 2023 was negative.  Monitor on telemetry.  Cardiology consulted.  We will start aspirin and continue anticoagulation and statin.  We will check D-dimer and echo.        Weakness of left upper extremity  Previously seen in the ED for left upper extremity weakness.  CT head with no acute findings.  Patient was unable to tolerate MRI.  Patient later left AMA.  No weakness now.  Patient declines MRI.      Hypertension  Resume home medications      Hyperlipidemia  Continue statin      Paroxysmal atrial fibrillation  Continue beta-blocker.  Hold Eliquis pending cardiology consult.  We will start therapeutic Lovenox in the meantime.      VTE Risk Mitigation (From admission, onward)           Ordered     enoxaparin injection 120 mg  Every 12 hours         08/18/23 0356     IP VTE HIGH RISK PATIENT  Once         08/18/23 0353     Place sequential compression device  Until discontinued         08/18/23 0353                       On 08/18/2023, patient should be placed in hospital observation services under my care.        Abelardo Vidales MD  Department of Hospital Medicine  [facility]

## 2023-08-18 NOTE — PLAN OF CARE
Patient cleared for discharge from case management standpoint.    Chart and discharge orders reviewed.  Patient discharged home with no further case management needs.     08/18/23 1658   Final Note   Assessment Type Final Discharge Note   Anticipated Discharge Disposition Home   Post-Acute Status   Discharge Delays None known at this time

## 2023-08-18 NOTE — PLAN OF CARE
[facility]  Initial Discharge Assessment       Primary Care Provider: Hilda Bronson FNP-C    Admission Diagnosis: Chest pain [R07.9]    Admission Date: 8/17/2023  Expected Discharge Date:     Assessment completed at bedside with Pt. Pt AAOx4s. Demographics, PCP, and insurance verified. No home health. No dialysis. Pt reports ability to complete ADLs without assistance. Pt verbalized plan to discharge home via family transport. Pt has no other needs to be addressed at this time.    Transition of Care Barriers: None    Payor: MEDICAID / Plan: [employer] [facility] (LACARE) / Product Type: Managed Medicaid /     Extended Emergency Contact Information  Primary Emergency Contact: [place]  Address: [address] [place] [address] [facility]  Home Phone: [phone]  Work Phone: [phone]  Mobile Phone: [phone]  Relation: Spouse  Preferred language: English   needed? No    Discharge Plan A: Home with family  Discharge Plan B: Home with family      [facility]  [address]  [facility] [address]  Phone: [phone] Fax: [phone]      Initial Assessment (most recent)       Adult Discharge Assessment - 08/18/23 1106          Discharge Assessment    Assessment Type Discharge Planning Assessment     Confirmed/corrected address, phone number and insurance Yes     Confirmed Demographics Correct on Facesheet     Source of Information patient     Does patient/caregiver understand observation status Yes     Communicated NURYS with patient/caregiver Date not available/Unable to determine     Reason For Admission Chest Pain     People in Home spouse;child(juli), dependent     Facility Arrived From: Home     Do you expect to return to your current living situation? Yes     Do you have help at home or someone to help you manage your care at home? Yes     Prior to hospitilization cognitive status: Alert/Oriented      Current cognitive status: Alert/Oriented     Home Accessibility stairs within home     Home Layout Able to live on 1st floor     Equipment Currently Used at Home blood pressure machine     Readmission within 30 days? No     Patient currently being followed by outpatient case management? No     Do you currently have service(s) that help you manage your care at home? No     Do you take prescription medications? Yes     Do you have prescription coverage? Yes     Coverage Payor:  MEDICAID - [facility]     Do you have any problems affording any of your prescribed medications? No     Is the patient taking medications as prescribed? yes     Who is going to help you get home at discharge? Kendall Urias (Spouse)   [phone]     How do you get to doctors appointments? car, drives self     Are you on dialysis? No     Do you take coumadin? No   eliquis    DME Needed Upon Discharge  none     Discharge Plan discussed with: Patient     Transition of Care Barriers None     Discharge Plan A Home with family     Discharge Plan B Home with family        Transportation Needs    In the past 12 months, has lack of transportation kept you from medical appointments or from getting medications? No     In the past 12 months, has lack of transportation kept you from meetings, work, or from getting things needed for daily living? No        Social Connections    Are you , , , , never , or living with a partner?         OTHER    Name(s) of People in Home Kendall Urias (Spouse)   [phone]

## 2023-08-18 NOTE — ASSESSMENT & PLAN NOTE
Body mass index is 37.3 kg/m². Morbid obesity complicates all aspects of disease management from diagnostic modalities to treatment.

## 2023-08-18 NOTE — SUBJECTIVE & OBJECTIVE
Past Medical History:   Diagnosis Date    Atrial fibrillation     Essential (primary) hypertension     History of cardiac radiofrequency ablation     Hyperlipidemia        Past Surgical History:   Procedure Laterality Date    ABLATION OF ARRHYTHMOGENIC FOCUS FOR ATRIAL FIBRILLATION N/A 2021    Procedure: ABLATION, ARRHYTHMOGENIC FOCUS, FOR ATRIAL FIBRILLATION;  Surgeon: Daniel Handy MD;  Location: [facility] EP LAB;  Service: Cardiology;  Laterality: N/A;  AF, PVI, GABRIELA (Cx if SR), PVI, RFA, CARTO, GEN, GP, 3 PREP    CARDIOVERSION      TONSILLECTOMY         Review of patient's allergies indicates:  No Known Allergies    No current facility-administered medications on file prior to encounter.     Current Outpatient Medications on File Prior to Encounter   Medication Sig    apixaban (ELIQUIS) 5 mg Tab Take 1 tablet (5 mg total) by mouth 2 (two) times daily.    atorvastatin (LIPITOR) 40 MG tablet Take 1 tablet (40 mg total) by mouth once daily.    magnesium oxide (MAG-OX) 400 mg (241.3 mg magnesium) tablet Take 1 tablet (400 mg total) by mouth once daily.    metoprolol succinate (TOPROL-XL) 25 MG 24 hr tablet Take 1 tablet (25 mg total) by mouth 2 (two) times daily.    omeprazole (PRILOSEC) 20 MG capsule Take 20 mg by mouth once daily.    valsartan (DIOVAN) 320 MG tablet Take 1 tablet (320 mg total) by mouth once daily.     Family History       Problem Relation (Age of Onset)    Heart attack Maternal Grandfather          Tobacco Use    Smoking status: Former     Current packs/day: 0.00     Average packs/day: 1 pack/day for 5.0 years (5.0 ttl pk-yrs)     Types: Cigarettes     Start date:      Quit date: 2015     Years since quittin.9    Smokeless tobacco: Current     Types: Chew    Tobacco comments:     1/2 can chew qd   Substance and Sexual Activity    Alcohol use: Yes     Alcohol/week: 4.0 standard drinks of alcohol     Types: 4 Cans of beer per week     Comment: rarely    Drug use: Yes      Frequency: 1.0 times per week     Types: Marijuana     Comment: Hx: Heroin (last use April 2012) smokes weed ocassionally     Sexual activity: Yes     Partners: Female     Review of Systems   Constitutional: Negative.    HENT: Negative.     Eyes: Negative.    Respiratory:  Positive for shortness of breath. Negative for cough, wheezing and stridor.    Cardiovascular:  Positive for chest pain. Negative for palpitations and leg swelling.   Gastrointestinal: Negative.    Endocrine: Negative.    Genitourinary: Negative.    Musculoskeletal: Negative.    Skin: Negative.    Neurological: Negative.    Hematological: Negative.    Psychiatric/Behavioral: Negative.       Objective:     Vital Signs (Most Recent):  Temp: 98 °F (36.7 °C) (08/17/23 2316)  Pulse: 78 (08/18/23 0231)  Resp: 17 (08/18/23 0236)  BP: (!) 170/91 (08/18/23 0231)  SpO2: 96 % (08/18/23 0231) Vital Signs (24h Range):  Temp:  [98 °F (36.7 °C)] 98 °F (36.7 °C)  Pulse:  [] 78  Resp:  [17-19] 17  SpO2:  [95 %-99 %] 96 %  BP: (135-184)/() 170/91     Weight: 122.5 kg (270 lb)  Body mass index is 36.62 kg/m².     Physical Exam  Vitals and nursing note reviewed.   Constitutional:       Appearance: Normal appearance.   HENT:      Head: Normocephalic and atraumatic.      Nose: Nose normal.   Eyes:      Extraocular Movements: Extraocular movements intact.      Conjunctiva/sclera: Conjunctivae normal.      Pupils: Pupils are equal, round, and reactive to light.   Cardiovascular:      Rate and Rhythm: Normal rate and regular rhythm.      Heart sounds: No murmur heard.  Pulmonary:      Effort: Pulmonary effort is normal. No respiratory distress.      Breath sounds: Normal breath sounds. No stridor. No wheezing or rales.   Abdominal:      General: Abdomen is flat. There is no distension.      Palpations: Abdomen is soft.      Tenderness: There is no abdominal tenderness.   Musculoskeletal:         General: No swelling, tenderness or deformity. Normal range of  motion.      Cervical back: Normal range of motion and neck supple.      Right lower leg: No edema.      Left lower leg: No edema.   Skin:     General: Skin is warm and dry.      Coloration: Skin is not jaundiced.   Neurological:      General: No focal deficit present.      Mental Status: He is alert and oriented to person, place, and time.   Psychiatric:         Mood and Affect: Mood normal.         Behavior: Behavior normal.         Thought Content: Thought content normal.         Judgment: Judgment normal.              CRANIAL NERVES     CN III, IV, VI   Pupils are equal, round, and reactive to light.       Significant Labs: All pertinent labs within the past 24 hours have been reviewed.  Recent Lab Results         08/18/23  0230   08/18/23  0042   08/17/23  1243        Albumin   4.0   4.1       Alkaline Phosphatase   73   82       ALT   53   57       Anion Gap   6   8       AST   35   39       Baso #   0.12   0.11       Basophil %   1.1   1.1       BILIRUBIN TOTAL   1.0  Comment: For infants and newborns, interpretation of results should be based  on gestational age, weight and in agreement with clinical  observations.    Premature Infant recommended reference ranges:  Up to 24 hours.............<8.0 mg/dL  Up to 48 hours............<12.0 mg/dL  3-5 days..................<15.0 mg/dL  6-29 days.................<15.0 mg/dL     0.9  Comment: For infants and newborns, interpretation of results should be based  on gestational age, weight and in agreement with clinical  observations.    Premature Infant recommended reference ranges:  Up to 24 hours.............<8.0 mg/dL  Up to 48 hours............<12.0 mg/dL  3-5 days..................<15.0 mg/dL  6-29 days.................<15.0 mg/dL         BNP   12  Comment: Values of less than 100 pg/ml are consistent with non-CHF populations.   13  Comment: Values of less than 100 pg/ml are consistent with non-CHF populations.       BUN   19   18       Calcium   9.0   9.0        Chloride   108   105       Cholesterol     235  Comment: The National Cholesterol Education Program (NCEP) has set the  following guidelines (reference ranges) for Cholesterol:  Optimal.....................<200 mg/dL  Borderline High.............200-239 mg/dL  High........................> or = 240 mg/dL         CO2   24   26       Creatinine   1.4   1.3       Differential Method   Automated   Automated       eGFR   >60.0   >60.0       Eos #   0.6   0.4       Eosinophil %   5.5   3.8       Estimated Avg Glucose     103       Glucose   103   105       Gran # (ANC)   6.8   6.7       Gran %   61.3   66.3       HDL     48  Comment: The National Cholesterol Education Program (NCEP) has set the  following guidelines (reference values) for HDL Cholesterol:  Low...............<40 mg/dL  Optimal...........>60 mg/dL         HDL/Cholesterol Ratio     20.4       Hematocrit   44.0   45.0       Hemoglobin   15.8   16.3       Hemoglobin A1C External     5.2  Comment: According to ADA guidelines, hemoglobin A1C <7.0% represents  optimal control in non-pregnant diabetic patients.  Different  metrics may apply to specific populations.   Standards of Medical Care in Diabetes - 2016.    For the purpose of screening for the presence of diabetes:  <5.7%     Consistent with the absence of diabetes  5.7-6.4%  Consistent with increasing risk for diabetes   (prediabetes)  >or=6.5%  Consistent with diabetes    Currently no consensus exists for use of hemoglobin A1C  for diagnosis of diabetes for children.         Immature Grans (Abs)   0.10  Comment: Mild elevation in immature granulocytes is non specific and   can be seen in a variety of conditions including stress response,   acute inflammation, trauma and pregnancy. Correlation with other   laboratory and clinical findings is essential.     0.11  Comment: Mild elevation in immature granulocytes is non specific and   can be seen in a variety of conditions including stress response,   acute  inflammation, trauma and pregnancy. Correlation with other   laboratory and clinical findings is essential.         Immature Granulocytes   0.9   1.1       INR   1.0  Comment: Coumadin Therapy:  2.0 - 3.0 for INR for all indicators except mechanical heart valves  and antiphospholipid syndromes which should use 2.5 - 3.5.           LDL Cholesterol External     Invalid, Trig>400.0  Comment: The National Cholesterol Education Program (NCEP) has set the  following guidelines (reference values) for LDL Cholesterol:  Optimal.......................<130 mg/dL  Borderline High...............130-159 mg/dL  High..........................160-189 mg/dL  Very High.....................>190 mg/dL         Lymph #   2.7   2.0       Lymph %   24.2   20.3       Magnesium     2.2       MCH   33.2   33.6       MCHC   35.9   36.2       MCV   92   93       Mono #   0.8   0.7       Mono %   7.0   7.4       MPV   9.8   10.4       Non-HDL Cholesterol     187  Comment: Risk category and Non-HDL cholesterol goals:  Coronary heart disease (CHD)or equivalent (10-year risk of CHD >20%):  Non-HDL cholesterol goal     <130 mg/dL  Two or more CHD risk factors and 10-year risk of CHD <= 20%:  Non-HDL cholesterol goal     <160 mg/dL  0 to 1 CHD risk factor:  Non-HDL cholesterol goal     <190 mg/dL         nRBC   0   0       Platelets   182   202       Potassium   3.8   3.8       PROTEIN TOTAL   7.0   7.1       Protime   10.9         RBC   4.76   4.85       RDW   12.8   12.7       Sodium   138   139       Total Cholesterol/HDL Ratio     4.9       Triglycerides     579  Comment: The National Cholesterol Education Program (NCEP) has set the  following guidelines (reference values) for triglycerides:  Normal......................<150 mg/dL  Borderline High.............150-199 mg/dL  High........................200-499 mg/dL         Troponin I High Sensitivity 7.8  Comment: Troponin results differ between methods. Do not use   results between Troponin  methods interchangeably.    Alkaline Phospatase levels above 400 U/L may   cause false positive results.    Access hsTnI should not be used for patients taking   Asfotase lore (Strensiq).     8.2  Comment: Troponin results differ between methods. Do not use   results between Troponin methods interchangeably.    Alkaline Phospatase levels above 400 U/L may   cause false positive results.    Access hsTnI should not be used for patients taking   Asfotase lore (Strensiq).     6.3  Comment: Troponin results differ between methods. Do not use   results between Troponin methods interchangeably.    Alkaline Phospatase levels above 400 U/L may   cause false positive results.    Access hsTnI should not be used for patients taking   Asfotase lore (Strensiq).         WBC   11.09   10.03               Significant Imaging: I have reviewed all pertinent imaging results/findings within the past 24 hours.

## 2023-08-18 NOTE — HOSPITAL COURSE
Nabil Urias is a 38 year old male with a past medical history of Afib, TIA (LUE weakness) HTN, HLD, obesity and GERD who presented with chest pain in the setting of hypertensive urgency. Troponin levels are unremarkable. EKG shows TWI in lead III. A TTE is unremarkable. Cardiology has been consulted and titrated the patient's antihypertensive medications. Stress testing was not performed. He was discharged 8/18/2023 and will follow up with his PCP and with Cardiology in the outpatient setting.

## 2023-08-18 NOTE — ASSESSMENT & PLAN NOTE
D-dimer negative. Troponin negative. EKG with TWI at lead III.  -TTE  -Cardiology consulted  -Telemetry  -ASA  -Statin  -Metoprolol

## 2023-08-18 NOTE — ASSESSMENT & PLAN NOTE
Continue beta-blocker.  Hold Eliquis pending cardiology consult.  We will start therapeutic Lovenox in the meantime.

## 2023-08-18 NOTE — FIRST PROVIDER EVALUATION
Medical screening examination initiated.  I have conducted a focused provider triage encounter, findings are as follows:    Brief history of present illness:  Patient presents to ED for concern for chest pain.  Patient reports he was in the emergency from earlier for concern for TIA.  Patient reports he can not do the MRI and he left AMA.  Patient reports the chest pain is new and it started tonight.    Vitals:    08/17/23 2316   BP: (!) 177/114   BP Location: Right arm   Patient Position: Sitting   Pulse: 84   Resp: 18   Temp: 98 °F (36.7 °C)   TempSrc: Oral   SpO2: 97%   Weight: 122.5 kg (270 lb)       Pertinent physical exam:  Patient is awake and alert in no acute distress.    Brief workup plan:  Labs, EKG    Preliminary workup initiated; this workup will be continued and followed by the physician or advanced practice provider that is assigned to the patient when roomed.

## 2023-08-18 NOTE — ASSESSMENT & PLAN NOTE
Previously seen in the ED for left upper extremity weakness.  CT head with no acute findings.  Patient was unable to tolerate MRI.  Patient later left AMA.  No weakness now.  Patient declines MRI.

## 2023-08-18 NOTE — TELEPHONE ENCOUNTER
----- Message from Kel Acosta sent at 8/18/2023  4:36 PM CDT -----  Regarding: ret call  Contact: wife at [phone]  Type:  Patient Returning Call    Who Called:  wife / tamir    Who Left Message for Patient:  Abby     Does the patient know what this is regarding?:  yes    Best Call Back Number:  [phone]    Additional Information:

## 2023-08-18 NOTE — ED PROVIDER NOTES
Encounter Date: 2023       History     Chief Complaint   Patient presents with    Chest Pain     Patient c/o chest pain x 30 min. Was seen here today.     Headache     Patient presents emergency department reported chest pain started after leaving hospital he was seen in the emergency department earlier for TIA like symptoms he was unable to sit for an MRI he was encouraged to be admitted however he did not wish to be admitted at that time left total states after going home he given having chest pain with associated shortness breath he does have a history of AFib hypertension hyperlipidemia he has had an ablation in the past no stents in the past he did take his evening medications but at arrival emergency department patient's blood pressure is markedly elevated he states they just recently doubled his blood pressure meds        Review of patient's allergies indicates:  No Known Allergies  Past Medical History:   Diagnosis Date    Atrial fibrillation     Essential (primary) hypertension     History of cardiac radiofrequency ablation     Hyperlipidemia      Past Surgical History:   Procedure Laterality Date    ABLATION OF ARRHYTHMOGENIC FOCUS FOR ATRIAL FIBRILLATION N/A 2021    Procedure: ABLATION, ARRHYTHMOGENIC FOCUS, FOR ATRIAL FIBRILLATION;  Surgeon: Daniel Handy MD;  Location: [facility] EP LAB;  Service: Cardiology;  Laterality: N/A;  AF, PVI, GABRIELA (Cx if SR), PVI, RFA, CARTO, GEN, GP, 3 PREP    CARDIOVERSION      TONSILLECTOMY       Family History   Problem Relation Age of Onset    Heart attack Maternal Grandfather      Social History     Tobacco Use    Smoking status: Former     Current packs/day: 0.00     Average packs/day: 1 pack/day for 5.0 years (5.0 ttl pk-yrs)     Types: Cigarettes     Start date:      Quit date: 2015     Years since quittin.9    Smokeless tobacco: Current     Types: Chew    Tobacco comments:     1/2 can chew qd   Substance Use Topics    Alcohol use: Yes      Alcohol/week: 4.0 standard drinks of alcohol     Types: 4 Cans of beer per week     Comment: rarely    Drug use: Yes     Frequency: 1.0 times per week     Types: Marijuana     Comment: Hx: Heroin (last use April 2012) smokes weed ocassionally      Review of Systems   Constitutional:  Negative for chills, diaphoresis and fever.   Respiratory:  Positive for shortness of breath.    Cardiovascular:  Positive for chest pain. Negative for leg swelling.   Gastrointestinal:  Negative for abdominal pain, nausea and vomiting.   Neurological:  Negative for headaches.       Physical Exam     Initial Vitals [08/17/23 2316]   BP Pulse Resp Temp SpO2   (!) 177/114 84 18 98 °F (36.7 °C) 97 %      MAP       --         Physical Exam    Constitutional: He appears well-developed and well-nourished. No distress.   HENT:   Head: Normocephalic and atraumatic.   Right Ear: External ear normal.   Left Ear: External ear normal.   Mouth/Throat: Oropharynx is clear and moist.   Eyes: Pupils are equal, round, and reactive to light.   Neck: Neck supple.   Normal range of motion.  Cardiovascular:  Normal rate, regular rhythm, S1 normal, S2 normal, normal heart sounds and intact distal pulses.           Pulmonary/Chest: Breath sounds normal. No respiratory distress.   Abdominal: Abdomen is soft. Bowel sounds are normal. There is no abdominal tenderness.   Musculoskeletal:         General: Normal range of motion.      Cervical back: Normal range of motion and neck supple.     Neurological: He is alert and oriented to person, place, and time. He has normal strength. GCS score is 15. GCS eye subscore is 4. GCS verbal subscore is 5. GCS motor subscore is 6.   Skin: Skin is warm and dry. No rash noted.   Psychiatric: He has a normal mood and affect. His behavior is normal.         ED Course   Procedures  Labs Reviewed   CBC W/ AUTO DIFFERENTIAL - Abnormal; Notable for the following components:       Result Value    MCH 33.2 (*)     Immature  Granulocytes 0.9 (*)     Immature Grans (Abs) 0.10 (*)     Eos # 0.6 (*)     All other components within normal limits   COMPREHENSIVE METABOLIC PANEL - Abnormal; Notable for the following components:    ALT 53 (*)     Anion Gap 6 (*)     All other components within normal limits   B-TYPE NATRIURETIC PEPTIDE   TROPONIN I HIGH SENSITIVITY   PROTIME-INR   TROPONIN I HIGH SENSITIVITY          Imaging Results    None          Medications   aspirin tablet 325 mg (has no administration in time range)   aspirin EC tablet 81 mg (has no administration in time range)   sodium chloride 0.9% flush 10 mL (has no administration in time range)   acetaminophen tablet 650 mg (has no administration in time range)   ondansetron injection 4 mg (has no administration in time range)   atorvastatin tablet 40 mg (has no administration in time range)   metoprolol succinate (TOPROL-XL) 24 hr tablet 25 mg (has no administration in time range)   valsartan tablet 320 mg (has no administration in time range)   pantoprazole EC tablet 40 mg (has no administration in time range)   enoxaparin injection 120 mg (has no administration in time range)   hydrALAZINE injection 5 mg (5 mg Intravenous Given 8/18/23 0045)     Medical Decision Making  EKG shows no acute ST or T-wave changes Laboratory evaluation reviewed patient given dose of hydralazine emergency department for blood pressure control he received aspirin prior to no further chest pain emergency department I have discussed patient's findings with Dr. Murray who will evaluate patient in the emergency department for admission    Amount and/or Complexity of Data Reviewed  Labs: ordered.  ECG/medicine tests: ordered. Decision-making details documented in ED Course.    Risk  Prescription drug management.  Decision regarding hospitalization.                               Clinical Impression:   Final diagnoses:  [R07.9] Chest pain        ED Disposition Condition    Observation                 Jannette  Enoc GORDON MD  08/18/23 7681

## 2023-08-18 NOTE — SUBJECTIVE & OBJECTIVE
"Interval History: see "Hospital Course"    Review of Systems   Respiratory:  Positive for shortness of breath.    Cardiovascular:  Positive for chest pain.     Objective:     Vital Signs (Most Recent):  Temp: 98.1 °F (36.7 °C) (08/18/23 1055)  Pulse: 78 (08/18/23 1055)  Resp: 16 (08/18/23 1055)  BP: (!) 143/81 (08/18/23 1027)  SpO2: 97 % (08/18/23 1055) Vital Signs (24h Range):  Temp:  [97.4 °F (36.3 °C)-98.4 °F (36.9 °C)] 98.1 °F (36.7 °C)  Pulse:  [71-94] 78  Resp:  [16-19] 16  SpO2:  [95 %-98 %] 97 %  BP: (135-185)/() 143/81     Weight: 124.7 kg (275 lb)  Body mass index is 37.3 kg/m².    Intake/Output Summary (Last 24 hours) at 8/18/2023 1218  Last data filed at 8/18/2023 0735  Gross per 24 hour   Intake 0 ml   Output --   Net 0 ml         Physical Exam  Vitals and nursing note reviewed.   Constitutional:       General: He is not in acute distress.     Appearance: He is obese.   HENT:      Head: Normocephalic and atraumatic.      Right Ear: External ear normal.      Left Ear: External ear normal.      Nose: Nose normal.      Mouth/Throat:      Mouth: Mucous membranes are moist.      Pharynx: Oropharynx is clear.   Eyes:      Extraocular Movements: Extraocular movements intact.      Conjunctiva/sclera: Conjunctivae normal.   Cardiovascular:      Rate and Rhythm: Normal rate and regular rhythm.      Pulses: Normal pulses.      Heart sounds: Normal heart sounds.   Pulmonary:      Effort: Pulmonary effort is normal.      Breath sounds: Normal breath sounds.   Abdominal:      General: Bowel sounds are normal.      Palpations: Abdomen is soft.   Musculoskeletal:         General: Normal range of motion.      Cervical back: Normal range of motion and neck supple.   Skin:     General: Skin is warm and dry.   Neurological:      Mental Status: Mental status is at baseline.   Psychiatric:         Mood and Affect: Mood normal.         Behavior: Behavior normal.             Significant Labs: All pertinent labs within " the past 24 hours have been reviewed.    Significant Imaging: I have reviewed all pertinent imaging results/findings within the past 24 hours.

## 2023-08-18 NOTE — HPI
Patient is a 38-year-old male with hypertension, hyperlipidemia, AFib on Eliquis, and hx of PE who presents with chest pain.  Chest pain began yesterday evening while at rest.  Pain is moderate in nature and located right upper chest.  Does not radiate.  Did have some associated shortness of breath at that time.  Chest pain has now resolved.  Patient was seen earlier today in the ED for TIA like symptoms.  At that time, patient was having left upper extremity numbness and weakness.  Patient denies any numbness or weakness now.  Patient reports compliance with his antihypertensives.  Patient is afebrile with a initial blood pressure of 184/114.  98% on room air. WBC 11.0 and hemoglobin 15.8.  Creatinine 1.4.  Troponin 7.8 and BNP 12.  EKG normal sinus rhythm, no ST elevations.

## 2023-08-18 NOTE — TELEPHONE ENCOUNTER
S/w patient wife and she stated patient is currently in the hospital right now admitted in room 3007. Patient went to ED yesterday morning and BP was 150/115 patient stated he could not feel/ use his left arm. Patient left ED AMA and around midnight patient woke wife up to bring him back to the ED BP was 198/119. He again had no feeling / usage of left arm. Patient stated the back of his hand is still numb. Patient wife also stated that patient had a bad drinking problem a couple of months ago and recently quit cold turkey. S/w maryam la NP and she stated that she will go see him today at his room.

## 2023-08-18 NOTE — CONSULTS
[facility]  Department of Cardiology  Consult Note      PATIENT NAME: Nabil Urias  MRN: [id number]  TODAY'S DATE: 08/18/2023  ADMIT DATE: 8/17/2023                          CONSULT REQUESTED BY: Ankit Simmons MD    SUBJECTIVE     PRINCIPAL PROBLEM: Chest pain      REASON FOR CONSULT:  Chest pain      HPI: Pt is 39 yo young male known to me and our clinic with history of PAF (ablation 2021), HTN, DVT, Anxiety    Pt missed night time dose of antihypertensive medication on Wednesday night. He then presented to hospital yesterday around 10 am with c/o numbness and loss of motion to left arm and elevated BP. He was sent to MRI for neuro work up and got claustrophobic and could not complete the test. He ended up going AMA around 2pm. He went to work same day and had to leave as he was  not feeling well. He developed bad headache, sweating, and BP 180s/110 range so he returned to hospital.   Pt has difficulty managing anxiety; has been on multiple medications and tried smoking weed and drinking ETOH to control the anxiety. Last ETOH intake 2-3 weeks ago; stopped smoking weed about 2 months ago    Mom alive 65, no heart conditions. Dad alive 64 yo, no heart conditions; 2 siblings with no heart conditions or HTN  Pt is , works as  (local) works mostly night shift. Dips tobacco but no cigarette smoking  He has severe insomnia, only sleeps 2-3 hours a night; possible sleep apnea. Had been referred to sleep disorder center outpatient  Stress test in March negative for reversible ischemia    HPI from hospitalist H&P:  Patient is a 38-year-old male with hypertension, hyperlipidemia, AFib on Eliquis, and hx of PE who presents with chest pain.  Chest pain began yesterday evening while at rest.  Pain is moderate in nature and located right upper chest.  Does not radiate.  Did have some associated shortness of breath at that time.  Chest pain has now resolved.  Patient was seen earlier today in  the ED for TIA like symptoms.  At that time, patient was having left upper extremity numbness and weakness.  Patient denies any numbness or weakness now.  Patient reports compliance with his antihypertensives.  Patient is afebrile with a initial blood pressure of 184/114.  98% on room air. WBC 11.0 and hemoglobin 15.8.  Creatinine 1.4.  Troponin 7.8 and BNP 12.  EKG normal sinus rhythm, no ST elevations.        Review of patient's allergies indicates:  No Known Allergies    Past Medical History:   Diagnosis Date    Atrial fibrillation     Essential (primary) hypertension     History of cardiac radiofrequency ablation     Hyperlipidemia      Past Surgical History:   Procedure Laterality Date    ABLATION OF ARRHYTHMOGENIC FOCUS FOR ATRIAL FIBRILLATION N/A 2021    Procedure: ABLATION, ARRHYTHMOGENIC FOCUS, FOR ATRIAL FIBRILLATION;  Surgeon: Daniel Handy MD;  Location: [facility] EP LAB;  Service: Cardiology;  Laterality: N/A;  AF, PVI, GABRIELA (Cx if SR), PVI, RFA, CARTO, GEN, GP, 3 PREP    CARDIOVERSION      TONSILLECTOMY       Social History     Tobacco Use    Smoking status: Former     Current packs/day: 0.00     Average packs/day: 1 pack/day for 5.0 years (5.0 ttl pk-yrs)     Types: Cigarettes     Start date:      Quit date: 2015     Years since quittin.9    Smokeless tobacco: Current     Types: Chew    Tobacco comments:     1/2 can chew qd   Substance Use Topics    Alcohol use: Yes     Alcohol/week: 4.0 standard drinks of alcohol     Types: 4 Cans of beer per week     Comment: rarely    Drug use: Yes     Frequency: 1.0 times per week     Types: Marijuana     Comment: Hx: Heroin (last use 2012) smokes weed ocassionally         REVIEW OF SYSTEMS  CONSTITUTIONAL: Negative for chills, fatigue and fever.   EYES: No double vision, No blurred vision  NEURO: ++headaches when BP 180s/110, No dizziness  RESPIRATORY: ++Sleep apnea; Negative for cough, shortness of breath and wheezing.    CARDIOVASCULAR:  Elevated BP; Chest pain resolved; Negative for chest pain, palpitations and leg swelling.   GI: Negative for abdominal pain, No melena, diarrhea, nausea and vomiting.   : Negative for dysuria and frequency, Negative for hematuria  SKIN: Negative for bruising, Negative for edema or discoloration noted.   ENDOCRINE: Negative for polyphagia, Negative for heat intolerance, Negative for cold intolerance  PSYCHIATRIC: ++Anxiety, ++insomnia   MUSCULOSKELETAL: Negative for neck pain, Negative for muscle weakness, Negative for back pain     OBJECTIVE     VITAL SIGNS (Most Recent)  Temp: 98.1 °F (36.7 °C) (08/18/23 1055)  Pulse: 78 (08/18/23 1055)  Resp: 16 (08/18/23 1055)  BP: (!) 143/81 (08/18/23 1027)  SpO2: 97 % (08/18/23 1055)    VENTILATION STATUS  Resp: 16 (08/18/23 1055)  SpO2: 97 % (08/18/23 1055)           I & O (Last 24H):  Intake/Output Summary (Last 24 hours) at 8/18/2023 1219  Last data filed at 8/18/2023 0735  Gross per 24 hour   Intake 0 ml   Output --   Net 0 ml       WEIGHTS  Wt Readings from Last 3 Encounters:   08/18/23 1027 124.7 kg (275 lb)   08/18/23 0547 124.9 kg (275 lb 4.8 oz)   08/17/23 2316 122.5 kg (270 lb)   08/18/23 0833 124.7 kg (275 lb)   08/17/23 1113 122.5 kg (270 lb)       PHYSICAL EXAM MANUAL /100 mmHg : No symptoms current w/ this BP; no facial flushing  GENERAL: well built, well nourished, well-developed in no apparent distress alert and oriented.   HEENT: Normocephalic. Pupils normal and conjunctivae normal.  Mucous membranes normal, no cyanosis or icterus, trachea central,no pallor or icterus is noted..   NECK: No JVD. No bruit..   THYROID: Thyroid not enlarged. No nodules present..   CARDIAC: Regular rate and rhythm. S1 is normal.S2 is normal.No gallops, clicks or murmurs noted at this time.  CHEST ANATOMY: normal.   LUNGS: Clear to auscultation. No wheezing or rhonchi..   ABDOMEN: Soft, obese, no masses or organomegaly.  No abdomen pulsations or bruits.  Normal bowel sounds.  No pulsations and no masses felt, No guarding or rebound.   URINARY: No ashley catheter   EXTREMITIES: No cyanosis, clubbing or edema noted at this time., no calf tenderness bilaterally.   PERIPHERAL VASCULAR SYSTEM: Good palpable distal pulses.   CENTRAL NERVOUS SYSTEM: No focal motor or sensory deficits noted.   SKIN: Skin without lesions, moist, well perfused.   MUSCLE STRENGTH & TONE: No noteable weakness, atrophy or abnormal movement.     HOME MEDICATIONS:  No current facility-administered medications on file prior to encounter.     Current Outpatient Medications on File Prior to Encounter   Medication Sig Dispense Refill    apixaban (ELIQUIS) 5 mg Tab Take 1 tablet (5 mg total) by mouth 2 (two) times daily. 60 tablet 11    ibuprofen (ADVIL,MOTRIN) 200 MG tablet Take 200 mg by mouth every 6 (six) hours as needed for Pain.      magnesium oxide (MAG-OX) 400 mg (241.3 mg magnesium) tablet Take 1 tablet (400 mg total) by mouth once daily. 90 tablet 3    metoprolol succinate (TOPROL-XL) 25 MG 24 hr tablet Take 1 tablet (25 mg total) by mouth 2 (two) times daily. 180 tablet 3    omeprazole (PRILOSEC) 20 MG capsule Take 20 mg by mouth once daily.      valsartan (DIOVAN) 320 MG tablet Take 1 tablet (320 mg total) by mouth once daily. 90 tablet 1    atorvastatin (LIPITOR) 40 MG tablet Take 1 tablet (40 mg total) by mouth once daily. (Patient not taking: Reported on 8/18/2023) 90 tablet 3       SCHEDULED MEDS:   amLODIPine  5 mg Oral Daily    [START ON 8/19/2023] aspirin  81 mg Oral Daily    atorvastatin  40 mg Oral QHS    enoxparin  1 mg/kg Subcutaneous Q12H (treatment, non-standard time)    hydroCHLOROthiazide  12.5 mg Oral Daily    metoprolol succinate  25 mg Oral BID    pantoprazole  40 mg Oral Before breakfast    valsartan  320 mg Oral Daily       CONTINUOUS INFUSIONS:    PRN MEDS:acetaminophen, labetalol, ondansetron, sodium chloride 0.9%    LABS AND DIAGNOSTICS     CBC LAST 3 DAYS  Recent Labs   Lab 08/17/23  1243  "08/18/23  0042   WBC 10.03 11.09   RBC 4.85 4.76   HGB 16.3 15.8   HCT 45.0 44.0   MCV 93 92   MCH 33.6* 33.2*   MCHC 36.2* 35.9   RDW 12.7 12.8    182   MPV 10.4 9.8   GRAN 66.3  6.7 61.3  6.8   LYMPH 20.3  2.0 24.2  2.7   MONO 7.4  0.7 7.0  0.8   BASO 0.11 0.12   NRBC 0 0       COAGULATION LAST 3 DAYS  Recent Labs   Lab 08/18/23  0042   INR 1.0       CHEMISTRY LAST 3 DAYS  Recent Labs   Lab 08/17/23  1243 08/18/23 0042    138   K 3.8 3.8    108   CO2 26 24   ANIONGAP 8 6*   BUN 18 19   CREATININE 1.3 1.4    103   CALCIUM 9.0 9.0   MG 2.2  --    ALBUMIN 4.1 4.0   PROT 7.1 7.0   ALKPHOS 82 73   ALT 57* 53*   AST 39 35   BILITOT 0.9 1.0       CARDIAC PROFILE LAST 3 DAYS  Recent Labs   Lab 08/17/23  1243 08/18/23 0042   BNP 13 12       ENDOCRINE LAST 3 DAYS  No results for input(s): "TSH", "PROCAL" in the last 168 hours.    LAST ARTERIAL BLOOD GAS  ABG  No results for input(s): "PH", "PO2", "PCO2", "HCO3", "BE" in the last 168 hours.    LAST 7 DAYS MICROBIOLOGY   Microbiology Results (last 7 days)       ** No results found for the last 168 hours. **            MOST RECENT IMAGING  X-Ray Chest AP Portable  Reason: Chest Pain    FINDINGS:    Portable chest with comparison chest x-ray August 18, 2021 show normal cardiomediastinal silhouette.  Lungs are clear. Pulmonary vasculature is normal. No acute osseous abnormality.    IMPRESSION:    No acute cardiopulmonary abnormality.    Electronically signed by:  Roland Frost DO  8/17/2023 1:22 PM CDT Workstation: [id number]  CT Head Without Contrast  CMS MANDATED QUALITY DATA - CT RADIATION - 436    All CT scans at this facility utilize dose modulation, iterative reconstruction, and/or weight based dosing when appropriate to reduce radiation dose to as low as reasonably achievable.    Reason: Neuro deficit, acute, stroke suspected Neuro deficit, acute, stroke suspected, Palpitations    TECHNIQUE: Head CT without IV contrast.    COMPARISON: " None    FINDINGS:    Gray-white differentiation is maintained without hemorrhage, midline shift, or mass effect.    The ventricles and cisterns are maintained.    Calvarium is intact. Visualized sinuses are clear.    IMPRESSION:    No acute intracranial abnormality.    Electronically signed by:  Roland Frost DO  8/17/2023 1:16 PM CDT Workstation: [id number]      ECHOCARDIOGRAM RESULTS (last 5)  Results for orders placed during the hospital encounter of 08/18/21    Echo    Interpretation Summary  · The estimated PA systolic pressure is 36 mmHg.  · Concentric remodeling and normal systolic function.  · The estimated ejection fraction is 65%.  · Normal left ventricular diastolic function.  · Normal right ventricular size with normal right ventricular systolic function.  · Mild mitral regurgitation.  · Mild tricuspid regurgitation.      Results for orders placed during the hospital encounter of 08/07/20    Transesophageal echo (GABRIELA) with possible cardioversion    Interpretation Summary  Procedure:  GABRIELA/DCCV.    Indication:  Atrial fibrillation.    Counseling:  Patient was informed of the risks, benefits as well as alternatives to the procedure and informed consent was obtained.    Procedure:  After obtaining informed consent the patient was sedated with the help of Anesthesia team. A GBARIELA probe was advanced without difficulty and images were obtained. No obvious left atrial appendage was seen. No obvious thrombus was noted in the visualized portions of the other chambers. Subsequently synchronized cardioversion using 150 J of biphasic energy was done with successful termination of the atrial fibrillation and restoration of sinus rhythm.    Patient tolerated the procedure well and no immediate complications were noted.    Complications:  None noted.    Findings:  1. Aortic valve:  Structurally normal with adequate leaflet excursion. No significant regurgitation.  2. Mitral valve:  Structurally normal with good leaflet  excursion. Mild regurgitation.  3. Tricuspid valve: Structurally normal with adequate leaflet excursion. Trace regurgitation.  4. Pulmonary valve:  Structurally normal with adequate leaflet excursion. No significant regurgitation.  5. Overall LVEF:  Normal.  6. No obvious intracardiac shunting by color flow Doppler.    Conclusions:  1. No obvious left atrial appendage thrombus.  2. Successful cardioversion to normal sinus rhythm using 150 J of biphasic energy.      Echo Color Flow Doppler? Yes; Bubble Contrast? No    Interpretation Summary  · The estimated PA systolic pressure is 35 mmHg.  · Mild concentric left ventricular hypertrophy.  · Normal left ventricular systolic function. The estimated ejection fraction is 55%.  · Atrial fibrillation observed.  · Small left ventricular cavity size.  · Normal right ventricular systolic function.  · Mild tricuspid regurgitation.  · No pulmonary hypertension present.      CURRENT/PREVIOUS VISIT EKG  Results for orders placed or performed during the hospital encounter of 08/17/23   EKG 12-lead    Collection Time: 08/17/23 11:21 PM    Narrative    Test Reason : R07.9,    Vent. Rate : 084 BPM     Atrial Rate : 084 BPM     P-R Int : 122 ms          QRS Dur : 086 ms      QT Int : 346 ms       P-R-T Axes : 045 066 024 degrees     QTc Int : 408 ms    Normal sinus rhythm  Normal ECG  When compared with ECG of 17-AUG-2023 14:08,  No significant change was found    Referred By: [email or web address]   SELF           Confirmed By:            ASSESSMENT/PLAN:     Active Hospital Problems    Diagnosis    *Chest pain    Weakness of left upper extremity    Hypertension    Paroxysmal atrial fibrillation    Hyperlipidemia       ASSESSMENT & PLAN:   Hypertension  Hx Paroxysmal atrial fibrillation  Hx DVT bilateral lower extremities  Hypertriglyceridemia  RAMIRO  Anxiety  Insomnia    RECOMMENDATIONS:  Blood pressure elevation noted. Manual /100 mmHg. Continue metoprolol and Valsartan. Add amlodipine 5  mg and HCTZ 12.5 mg daily  Patient has not had recurrent chest pain. Troponin levels are negative. He had stress test in March 2023 that was negative for reversible ischemia  Echocardiogram obtained to evaluate LV and valvular function. Results pending  Patient has prior diagnosis of sleep apnea not using CPAP and severe insomnia. He has been referred to outpatient sleep disorder clinic.  Continue to observe on telemetry.   6.  Continue Eliquis 5 mg BID for history of paroxysmal atrial fibrillation and DVTs  7.  Triglycerides > 500. Atorvastatin started in hospital.     Sarahy Bay NP  [facility]  Department of Cardiology  Date of Service: 08/18/2023        I have personally interviewed and examined the patient, I have reviewed the Nurse Practitioner's history and physical, assessment, and plan. I agree with the findings and plan.      Dr. Jori Osuna M.D.  [facility]  Department of Cardiology  Date of Service: 08/18/2023  8:40 AM

## 2023-08-18 NOTE — DISCHARGE SUMMARY
[facility] Medicine  Discharge Summary      Patient Name: Nabil Urias  MRN: [id number]  [facility]: [id number]  Patient Class: OP- Observation  Admission Date: 8/17/2023  Hospital Length of Stay: 0 days  Discharge Date and Time: No discharge date for patient encounter.  Attending Physician: Ankit Simmons MD   Discharging Provider: Ankit Simmons MD  Primary Care Provider: Hilda Bronson FNP-C    Primary Care Team: Networked reference to record PCT     HPI:   Patient is a 38-year-old male with hypertension, hyperlipidemia, AFib on Eliquis, and hx of PE who presents with chest pain.  Chest pain began yesterday evening while at rest.  Pain is moderate in nature and located right upper chest.  Does not radiate.  Did have some associated shortness of breath at that time.  Chest pain has now resolved.  Patient was seen earlier today in the ED for TIA like symptoms.  At that time, patient was having left upper extremity numbness and weakness.  Patient denies any numbness or weakness now.  Patient reports compliance with his antihypertensives.  Patient is afebrile with a initial blood pressure of 184/114.  98% on room air. WBC 11.0 and hemoglobin 15.8.  Creatinine 1.4.  Troponin 7.8 and BNP 12.  EKG normal sinus rhythm, no ST elevations.      * No surgery found *      Hospital Course:   Nabil Urias is a 38 year old male with a past medical history of Afib, TIA (LUE weakness) HTN, HLD, obesity and GERD who presented with chest pain in the setting of hypertensive urgency. Troponin levels are unremarkable. EKG shows TWI in lead III. A TTE is unremarkable. Cardiology has been consulted and titrated the patient's antihypertensive medications. Stress testing was not performed. He was discharged 8/18/2023 and will follow up with his PCP and with Cardiology in the outpatient setting.        Goals of Care Treatment Preferences:  Code Status: Full Code      Consults:   Consults (From admission, onward)         Status Ordering Provider     Inpatient consult to Cardiology  Once        Provider:  (Not yet assigned)    DONN Ireland  TIA (transient ischemic attack)  -Will need outpatient MRI under sedation  -ASA    Cardiac/Vascular  * Chest pain-resolved as of 8/18/2023  D-dimer negative. Troponin negative. EKG with TWI at lead III.  -TTE  -Cardiology consulted  -Telemetry  -ASA  -Statin  -Metoprolol        Hypertension  -Amlodipine  -Metoprolol  -HCTZ  -Valsartan  -Continue to monitor    Hyperlipidemia  -Continue statin  -Continue ASA  -Fibrate    Paroxysmal atrial fibrillation  -Telemetry  -Metoprolol  -Eliquis    Endocrine  BMI 37.0-37.9, adult  Body mass index is 37.3 kg/m². Morbid obesity complicates all aspects of disease management from diagnostic modalities to treatment.     GI  GERD (gastroesophageal reflux disease)  -PPI      Orthopedic  Weakness of left upper extremity-resolved as of 8/18/2023  Previously seen in the ED for left upper extremity weakness.  CT head with no acute findings.  Patient was unable to tolerate MRI.  Patient later left AMA.  No weakness now.  Patient declines MRI.        Final Active Diagnoses:    Diagnosis Date Noted POA    GERD (gastroesophageal reflux disease) [K21.9] 08/18/2023 Yes    TIA (transient ischemic attack) [G45.9] 08/18/2023 Yes    BMI 37.0-37.9, adult [Z68.37] 08/07/2023 Not Applicable    Hypertension [I10] 03/02/2023 Yes    Paroxysmal atrial fibrillation [I48.0]  Yes    Hyperlipidemia [E78.5]  Yes      Problems Resolved During this Admission:    Diagnosis Date Noted Date Resolved POA    PRINCIPAL PROBLEM:  Chest pain [R07.9] 08/07/2020 08/18/2023 Yes    Weakness of left upper extremity [R29.898] 08/18/2023 08/18/2023 Yes       Discharged Condition: stable    Disposition: Home or Self Care    Follow Up:   Follow-up Information     Hilda Bronson FNP-C Follow up in 2 week(s).    Specialty: Family Medicine  Contact information:  [address]  [facility]  [place] [address]  [phone]             Jori Osuna MD Follow up in 2 week(s).    Specialties: Cardiology, Cardiovascular Disease  Contact information:  [address] [facility]  [address]  [place] [address]  [phone]                       Patient Instructions:      Diet Cardiac     Notify your health care provider if you experience any of the following:  increased confusion or weakness     Notify your health care provider if you experience any of the following:  persistent dizziness, light-headedness, or visual disturbances     Notify your health care provider if you experience any of the following:  severe persistent headache     Notify your health care provider if you experience any of the following:  difficulty breathing or increased cough     Notify your health care provider if you experience any of the following:  severe uncontrolled pain     Notify your health care provider if you experience any of the following:  persistent nausea and vomiting or diarrhea     Notify your health care provider if you experience any of the following:  temperature >100.4     Activity as tolerated       Significant Diagnostic Studies: Labs: All labs within the past 24 hours have been reviewed    Pending Diagnostic Studies:     None         Medications:  Reconciled Home Medications:      Medication List      START taking these medications    amLODIPine 5 MG tablet  Commonly known as: NORVASC  Take 1 tablet (5 mg total) by mouth once daily.  Start taking on: August 19, 2023     aspirin 81 MG EC tablet  Commonly known as: ECOTRIN  Take 1 tablet (81 mg total) by mouth once daily.     atorvastatin 40 MG tablet  Commonly known as: LIPITOR  Take 1 tablet (40 mg total) by mouth once daily.     fenofibrate 160 MG Tab  Take 1 tablet (160 mg total) by mouth once daily.     hydroCHLOROthiazide 12.5 MG Tab  Commonly known as: HYDRODIURIL  Take 1 tablet (12.5 mg total) by mouth once daily.  Start taking on: August 19, 2023     *  metoprolol tartrate 25 MG tablet  Commonly known as: LOPRESSOR  Take 1 tablet (25 mg total) by mouth once daily.     * metoprolol tartrate 25 MG tablet  Commonly known as: LOPRESSOR  Take 1 tablet (25 mg total) by mouth 2 (two) times daily.         * This list has 2 medication(s) that are the same as other medications prescribed for you. Read the directions carefully, and ask your doctor or other care provider to review them with you.            CONTINUE taking these medications    ELIQUIS 5 mg Tab  Generic drug: apixaban  Take 1 tablet (5 mg total) by mouth 2 (two) times daily.     ibuprofen 200 MG tablet  Commonly known as: ADVIL,MOTRIN  Take 200 mg by mouth every 6 (six) hours as needed for Pain.     magnesium oxide 400 mg (241.3 mg magnesium) tablet  Commonly known as: MAG-OX  Take 1 tablet (400 mg total) by mouth once daily.     omeprazole 20 MG capsule  Commonly known as: PRILOSEC  Take 20 mg by mouth once daily.     valsartan 320 MG tablet  Commonly known as: DIOVAN  Take 1 tablet (320 mg total) by mouth once daily.        STOP taking these medications    metoprolol succinate 25 MG 24 hr tablet  Commonly known as: TOPROL-XL            Indwelling Lines/Drains at time of discharge:   Lines/Drains/Airways     None                 Time spent on the discharge of patient: 36 minutes         Ankit Simmons MD  Department of Hospital Medicine  [facility]

## 2023-08-18 NOTE — NURSING
Pt and spouse verbalized understanding of discharge instructions. IV and tele box removed. Box returned to Cardio B. Rx's delivered to pt at bedside from in house pharmacy. Pt ambulated down with spouse and all personal belongings to private vehicle.

## 2023-09-06 ENCOUNTER — OFFICE VISIT (OUTPATIENT)
Dept: CARDIOLOGY | Facility: CLINIC | Age: 38
End: 2023-09-06
Payer: MEDICAID

## 2023-09-06 VITALS
HEIGHT: 72 IN | SYSTOLIC BLOOD PRESSURE: 118 MMHG | DIASTOLIC BLOOD PRESSURE: 70 MMHG | WEIGHT: 268 LBS | BODY MASS INDEX: 36.3 KG/M2 | OXYGEN SATURATION: 95 % | HEART RATE: 98 BPM

## 2023-09-06 DIAGNOSIS — K21.9 GASTROESOPHAGEAL REFLUX DISEASE, UNSPECIFIED WHETHER ESOPHAGITIS PRESENT: ICD-10-CM

## 2023-09-06 DIAGNOSIS — G47.33 OSA (OBSTRUCTIVE SLEEP APNEA): ICD-10-CM

## 2023-09-06 DIAGNOSIS — F41.9 ANXIETY: ICD-10-CM

## 2023-09-06 DIAGNOSIS — G45.9 TIA (TRANSIENT ISCHEMIC ATTACK): ICD-10-CM

## 2023-09-06 DIAGNOSIS — F51.01 PRIMARY INSOMNIA: ICD-10-CM

## 2023-09-06 DIAGNOSIS — Z79.01 CURRENT USE OF LONG TERM ANTICOAGULATION: ICD-10-CM

## 2023-09-06 DIAGNOSIS — I48.0 PAROXYSMAL ATRIAL FIBRILLATION: ICD-10-CM

## 2023-09-06 PROCEDURE — 1159F PR MEDICATION LIST DOCUMENTED IN MEDICAL RECORD: ICD-10-PCS | Mod: CPTII,,, | Performed by: NURSE PRACTITIONER

## 2023-09-06 PROCEDURE — 99999 PR PBB SHADOW E&M-EST. PATIENT-LVL III: CPT | Mod: PBBFAC,,, | Performed by: NURSE PRACTITIONER

## 2023-09-06 PROCEDURE — 3074F SYST BP LT 130 MM HG: CPT | Mod: CPTII,,, | Performed by: NURSE PRACTITIONER

## 2023-09-06 PROCEDURE — 3044F PR MOST RECENT HEMOGLOBIN A1C LEVEL <7.0%: ICD-10-PCS | Mod: CPTII,,, | Performed by: NURSE PRACTITIONER

## 2023-09-06 PROCEDURE — 99214 PR OFFICE/OUTPT VISIT, EST, LEVL IV, 30-39 MIN: ICD-10-PCS | Mod: S$PBB,,, | Performed by: NURSE PRACTITIONER

## 2023-09-06 PROCEDURE — 3078F PR MOST RECENT DIASTOLIC BLOOD PRESSURE < 80 MM HG: ICD-10-PCS | Mod: CPTII,,, | Performed by: NURSE PRACTITIONER

## 2023-09-06 PROCEDURE — 1160F RVW MEDS BY RX/DR IN RCRD: CPT | Mod: CPTII,,, | Performed by: NURSE PRACTITIONER

## 2023-09-06 PROCEDURE — 4010F ACE/ARB THERAPY RXD/TAKEN: CPT | Mod: CPTII,,, | Performed by: NURSE PRACTITIONER

## 2023-09-06 PROCEDURE — 3008F PR BODY MASS INDEX (BMI) DOCUMENTED: ICD-10-PCS | Mod: CPTII,,, | Performed by: NURSE PRACTITIONER

## 2023-09-06 PROCEDURE — 1160F PR REVIEW ALL MEDS BY PRESCRIBER/CLIN PHARMACIST DOCUMENTED: ICD-10-PCS | Mod: CPTII,,, | Performed by: NURSE PRACTITIONER

## 2023-09-06 PROCEDURE — 3008F BODY MASS INDEX DOCD: CPT | Mod: CPTII,,, | Performed by: NURSE PRACTITIONER

## 2023-09-06 PROCEDURE — 99214 OFFICE O/P EST MOD 30 MIN: CPT | Mod: S$PBB,,, | Performed by: NURSE PRACTITIONER

## 2023-09-06 PROCEDURE — 99213 OFFICE O/P EST LOW 20 MIN: CPT | Mod: PBBFAC,PN | Performed by: NURSE PRACTITIONER

## 2023-09-06 PROCEDURE — 3078F DIAST BP <80 MM HG: CPT | Mod: CPTII,,, | Performed by: NURSE PRACTITIONER

## 2023-09-06 PROCEDURE — 99999 PR PBB SHADOW E&M-EST. PATIENT-LVL III: ICD-10-PCS | Mod: PBBFAC,,, | Performed by: NURSE PRACTITIONER

## 2023-09-06 PROCEDURE — 1159F MED LIST DOCD IN RCRD: CPT | Mod: CPTII,,, | Performed by: NURSE PRACTITIONER

## 2023-09-06 PROCEDURE — 4010F PR ACE/ARB THEARPY RXD/TAKEN: ICD-10-PCS | Mod: CPTII,,, | Performed by: NURSE PRACTITIONER

## 2023-09-06 PROCEDURE — 3044F HG A1C LEVEL LT 7.0%: CPT | Mod: CPTII,,, | Performed by: NURSE PRACTITIONER

## 2023-09-06 PROCEDURE — 3074F PR MOST RECENT SYSTOLIC BLOOD PRESSURE < 130 MM HG: ICD-10-PCS | Mod: CPTII,,, | Performed by: NURSE PRACTITIONER

## 2023-09-06 RX ORDER — ALPRAZOLAM 0.5 MG/1
0.5 TABLET ORAL NIGHTLY PRN
Qty: 10 TABLET | Refills: 0 | Status: SHIPPED | OUTPATIENT
Start: 2023-09-06 | End: 2023-09-12 | Stop reason: SDUPTHER

## 2023-09-06 NOTE — ASSESSMENT & PLAN NOTE
Has had failed multiple therapies. See HPI  Feels BP is affected by anxiety  Short supply of low dose alprazolam D: #10 written  Instructed to only take at night and give it 8 hrs before driving

## 2023-09-06 NOTE — PROGRESS NOTES
" Subjective:    Patient ID:  Nabil Urias is a 38 y.o. male patient here for evaluation Follow-up, Blurred Vision (Seeing tracers ), and Anxiety (Getting worse )    History of Present Illness:     Still having some occasional headaches and visual "tracers"  BP much better at home; 120-134 max/70s-90s; has some spells of hot flashes during day  Feels restless legs at night; feet wont quit moving  Feels he needs to get on med for anxiety; tried buspar, wellbutrin, zoloft, hydroxyzine, ambien, trazodone and multiple other meds for anxiety and insomnia; none of these meds have worked  Sees Hilda Bronson later this month; has tolerated alprazolam in past  Seeing sleep  in November; only sleeps few hours nightly; failed CPAP before.  Quit drinking ETOH and caffeine; no illicit drug use  Has high anxiety; sister and mom suffer with this as well; having more responsibilities w/ kids; Works days and sometimes nights  Hx ablation 2021      Hospital Course: 8/17/23  Nabil Urias is a 38 year old male with a past medical history of Afib, TIA (LUE weakness) HTN, HLD, obesity and GERD who presented with chest pain in the setting of hypertensive urgency. Troponin levels are unremarkable. EKG shows TWI in lead III. A TTE is unremarkable. Cardiology has been consulted and titrated the patient's antihypertensive medications. Stress testing was not performed. He was discharged 8/18/2023 and will follow up with his PCP and with Cardiology in the outpatient setting.          Most Recent Echocardiogram Results  Results for orders placed during the hospital encounter of 08/17/23    Echo    Interpretation Summary    Left Ventricle: The left ventricle is normal in size. Ventricular mass is normal. Normal wall thickness. Normal wall motion. There is normal systolic function with a visually estimated ejection fraction of 55 - 70%.  EF 57% There is normal diastolic function. Normal left ventricular filling pressure. Tissue " Doppler velocity is normal.    Right Ventricle: Normal right ventricular cavity size. Right ventricle wall motion  is normal. Systolic function is normal.    Aortic Valve: The aortic valve is a trileaflet valve.    Mitral Valve: There is no stenosis. The mean pressure gradient across the mitral valve is 2 mmHg at a heart rate of  bpm.    IVC/SVC: Normal venous pressure at 3 mmHg.    Patient is in normal sinus rhythm      Most Recent Nuclear Stress Test Results  Results for orders placed during the hospital encounter of 03/23/23    Nuclear Stress - Cardiology Interpreted    Interpretation Summary    Normal myocardial perfusion scan. There is no evidence of myocardial ischemia or infarction.    There is a  mild intensity fixed perfusion abnormality in the inferior wall of the left ventricle secondary to diaphragm attenuation.    There is moderate apical thinning which is a normal variant.    The gated perfusion images showed an ejection fraction of 50% at rest. The gated perfusion images showed an ejection fraction of 59% post stress. Normal ejection fraction is greater than 53%.    There is normal wall motion post stress.    There is mild global hypokinesis at rest.    LV cavity size is normal at rest and normal at stress.    The ECG portion of the study is negative for ischemia.    The patient reported no chest pain during the stress test.    There were no arrhythmias during stress.    The patient exercised for 7 minutes 5 seconds on a Jeremiah protocol, corresponding to a functional capacity of 10 METS, achieving a peak heart rate of 168 bpm, which is 91 % of the age predicted maximum heart rate.      Most Recent Cardiac PET Stress Test Results  No results found for this or any previous visit.      Most Recent Cardiovascular Angiogram results  No results found for this or any previous visit.      Other Most Recent Cardiology Results  Results for orders placed during the hospital encounter of 08/17/23    CARDIAC  MONITORING STRIPS      REVIEW OF SYSTEMS: As noted in HPI   CARDIOVASCULAR: No recent chest pain, palpitations, arm/neck/jaw pain, or edema.  RESPIRATORY: No recent fever, cough, SOB.  : No blood in the urine  GI: No reflux, nausea, vomiting, or blood in stool.   MUSCULOSKELETAL: No falls.   NEURO: No headaches, syncope, or dizziness.  EYES: No sudden changes in vision.   ++Anxiety and stress, worsened at night  Past Medical History:   Diagnosis Date    Atrial fibrillation     Essential (primary) hypertension     History of cardiac radiofrequency ablation     Hyperlipidemia      Past Surgical History:   Procedure Laterality Date    ABLATION OF ARRHYTHMOGENIC FOCUS FOR ATRIAL FIBRILLATION N/A 2021    Procedure: ABLATION, ARRHYTHMOGENIC FOCUS, FOR ATRIAL FIBRILLATION;  Surgeon: Daniel Handy MD;  Location: [facility] EP LAB;  Service: Cardiology;  Laterality: N/A;  AF, PVI, GABRIELA (Cx if SR), PVI, RFA, CARTO, GEN, GP, 3 PREP    CARDIOVERSION      TONSILLECTOMY       Social History     Tobacco Use    Smoking status: Former     Current packs/day: 0.00     Average packs/day: 1 pack/day for 5.0 years (5.0 ttl pk-yrs)     Types: Cigarettes     Start date:      Quit date: 2015     Years since quittin.9    Smokeless tobacco: Current     Types: Chew    Tobacco comments:     1/2 can chew qd   Substance Use Topics    Alcohol use: Yes     Alcohol/week: 4.0 standard drinks of alcohol     Types: 4 Cans of beer per week     Comment: rarely    Drug use: Yes     Frequency: 1.0 times per week     Types: Marijuana     Comment: Hx: Heroin (last use 2012) smokes weed ocassionally          Objective      Vitals:    23 1119   BP: 118/70   Pulse: 98       LAST EKG  Results for orders placed or performed during the hospital encounter of 23   EKG 12-lead    Collection Time: 23 11:21 PM    Narrative    Test Reason : R07.9,    Vent. Rate : 084 BPM     Atrial Rate : 084 BPM     P-R Int : 122 ms           QRS Dur : 086 ms      QT Int : 346 ms       P-R-T Axes : 045 066 024 degrees     QTc Int : 408 ms    Normal sinus rhythm  Normal ECG  When compared with ECG of 17-AUG-2023 14:08,  No significant change was found  Confirmed by Enrrique GALLEGOS, Jori GORDON ([id number]) on 8/20/2023 11:34:44 AM    Referred By: [email or web address]   SELF           Confirmed By:Jori Osuna MD     LIPIDS - LAST 2   Lab Results   Component Value Date    CHOL 235 (H) 08/17/2023    CHOL 218 (H) 08/02/2023    HDL 48 08/17/2023    HDL 41 08/02/2023    LDLCALC Invalid, Trig>400.0 08/17/2023    LDLCALC 127.2 08/02/2023    TRIG 579 (H) 08/17/2023    TRIG 249 (H) 08/02/2023    CHOLHDL 20.4 08/17/2023    CHOLHDL 18.8 (L) 08/02/2023     CARDIAC PROFILE - LAST 2  Lab Results   Component Value Date    BNP 12 08/18/2023    BNP 13 08/17/2023    CPK 75 08/18/2021     08/18/2021    CPKMB 2.6 08/07/2020     08/18/2021    TROPONINI <0.030 08/19/2021    TROPONINI <0.030 11/08/2020      CBC - LAST 2  Lab Results   Component Value Date    WBC 11.09 08/18/2023    WBC 10.03 08/17/2023    RBC 4.76 08/18/2023    RBC 4.85 08/17/2023    HGB 15.8 08/18/2023    HGB 16.3 08/17/2023    HCT 44.0 08/18/2023    HCT 45.0 08/17/2023     08/18/2023     08/17/2023     Lab Results   Component Value Date    LABPT 12.6 11/07/2020    INR 1.0 08/18/2023    INR 1.0 01/12/2021    APTT 28.0 01/12/2021     CHEMISTRY - LAST 2  Lab Results   Component Value Date     08/18/2023     08/17/2023    K 3.8 08/18/2023    K 3.8 08/17/2023     08/18/2023     08/17/2023    CO2 24 08/18/2023    CO2 26 08/17/2023    ANIONGAP 6 (L) 08/18/2023    ANIONGAP 8 08/17/2023    BUN 19 08/18/2023    BUN 18 08/17/2023    CREATININE 1.4 08/18/2023    CREATININE 1.3 08/17/2023     08/18/2023     08/17/2023    CALCIUM 9.0 08/18/2023    CALCIUM 9.0 08/17/2023    MG 2.2 08/17/2023    MG 1.9 08/19/2021    ALBUMIN 4.0 08/18/2023    ALBUMIN 4.1 08/17/2023    PROT 7.0  08/18/2023    PROT 7.1 08/17/2023    ALKPHOS 73 08/18/2023    ALKPHOS 82 08/17/2023    ALT 53 (H) 08/18/2023    ALT 57 (H) 08/17/2023    AST 35 08/18/2023    AST 39 08/17/2023    BILITOT 1.0 08/18/2023    BILITOT 0.9 08/17/2023      ENDOCRINE - LAST 2  Lab Results   Component Value Date    HGBA1C 5.2 08/17/2023    HGBA1C 5.2 08/02/2023    TSH 1.790 11/07/2020    TSH 1.610 08/08/2020        PHYSICAL EXAM  CONSTITUTIONAL: Well built, well nourished in no apparent distress  NECK: no carotid bruit, no JVD  LUNGS: CTA  CHEST WALL: no tenderness  HEART: regular rate and rhythm, S1, S2 normal, no murmur, click, rub or gallop   ABDOMEN: soft, non-tender; bowel sounds normal; no masses,  no organomegaly  EXTREMITIES: Extremities normal, no edema, no calf tenderness noted  NEURO: AAO X 3    I HAVE REVIEWED :    The vital signs, most recent cardiac testing, and most recent pertinent non-cardiology provider notes.    Current Outpatient Medications   Medication Instructions    ALPRAZolam (XANAX) 0.5 mg, Oral, Nightly PRN    amLODIPine (NORVASC) 5 mg, Oral, Daily    aspirin (ECOTRIN) 81 mg, Oral, Daily    atorvastatin (LIPITOR) 40 mg, Oral, Daily    ELIQUIS 5 mg, Oral, 2 times daily    fenofibrate 160 mg, Oral, Daily    hydroCHLOROthiazide (HYDRODIURIL) 12.5 mg, Oral, Daily    ibuprofen (ADVIL,MOTRIN) 200 mg, Oral, Every 6 hours PRN    magnesium oxide (MAG-OX) 400 mg, Oral, Daily    metoprolol tartrate (LOPRESSOR) 25 mg, Oral, 2 times daily    omeprazole (PRILOSEC) 20 mg, Oral, Daily    valsartan (DIOVAN) 320 mg, Oral, Daily      Assessment & Plan     RAMIRO (obstructive sleep apnea)  Failed CPAP   Sleep study referral sent  Has appt November    Insomnia  Seeing sleep disorder center     Current use of long term anticoagulation  No bleeding from Eliquis and aspirin    Paroxysmal atrial fibrillation  Continue Eliquis 5 mg BID and metoprolol tartrate 25 mg BID  RRR in office today    Anxiety  Has had failed multiple therapies. See  HPI  Feels BP is affected by anxiety  Short supply of low dose alprazolam D: #10 written  Instructed to only take at night and give it 8 hrs before driving    TIA (transient ischemic attack)  Hx TIA 2022  On Eliquis and Aspirin    GERD (gastroesophageal reflux disease)  Omeprazole PRN only    BMI 36.0-36.9,adult  Continue weight loss and physical activity      Advised continue low impact exercise, follow up with Psychiatry as scheduled and sleep disorder clinic as scheduled

## 2023-09-12 ENCOUNTER — PATIENT MESSAGE (OUTPATIENT)
Dept: FAMILY MEDICINE | Facility: CLINIC | Age: 38
End: 2023-09-12

## 2023-09-12 ENCOUNTER — TELEPHONE (OUTPATIENT)
Dept: FAMILY MEDICINE | Facility: CLINIC | Age: 38
End: 2023-09-12

## 2023-09-12 ENCOUNTER — OFFICE VISIT (OUTPATIENT)
Dept: FAMILY MEDICINE | Facility: CLINIC | Age: 38
End: 2023-09-12
Payer: MEDICAID

## 2023-09-12 VITALS
HEIGHT: 72 IN | HEART RATE: 101 BPM | TEMPERATURE: 98 F | SYSTOLIC BLOOD PRESSURE: 128 MMHG | BODY MASS INDEX: 36.7 KG/M2 | DIASTOLIC BLOOD PRESSURE: 74 MMHG | OXYGEN SATURATION: 99 % | WEIGHT: 271 LBS

## 2023-09-12 DIAGNOSIS — E78.1 HYPERTRIGLYCERIDEMIA: ICD-10-CM

## 2023-09-12 DIAGNOSIS — M62.838 MUSCLE SPASM: ICD-10-CM

## 2023-09-12 DIAGNOSIS — R73.01 IMPAIRED FASTING GLUCOSE: ICD-10-CM

## 2023-09-12 DIAGNOSIS — G45.9 TIA (TRANSIENT ISCHEMIC ATTACK): ICD-10-CM

## 2023-09-12 DIAGNOSIS — G47.00 INSOMNIA, UNSPECIFIED TYPE: ICD-10-CM

## 2023-09-12 DIAGNOSIS — G47.33 OSA (OBSTRUCTIVE SLEEP APNEA): ICD-10-CM

## 2023-09-12 DIAGNOSIS — E78.2 MIXED HYPERLIPIDEMIA: ICD-10-CM

## 2023-09-12 DIAGNOSIS — F41.9 ANXIETY: ICD-10-CM

## 2023-09-12 DIAGNOSIS — K21.9 GASTROESOPHAGEAL REFLUX DISEASE WITHOUT ESOPHAGITIS: ICD-10-CM

## 2023-09-12 DIAGNOSIS — I10 PRIMARY HYPERTENSION: Primary | ICD-10-CM

## 2023-09-12 DIAGNOSIS — Z79.01 CURRENT USE OF LONG TERM ANTICOAGULATION: ICD-10-CM

## 2023-09-12 DIAGNOSIS — I48.0 PAROXYSMAL ATRIAL FIBRILLATION: ICD-10-CM

## 2023-09-12 PROCEDURE — 3078F PR MOST RECENT DIASTOLIC BLOOD PRESSURE < 80 MM HG: ICD-10-PCS | Mod: CPTII,,, | Performed by: NURSE PRACTITIONER

## 2023-09-12 PROCEDURE — 3044F HG A1C LEVEL LT 7.0%: CPT | Mod: CPTII,,, | Performed by: NURSE PRACTITIONER

## 2023-09-12 PROCEDURE — 1159F MED LIST DOCD IN RCRD: CPT | Mod: CPTII,,, | Performed by: NURSE PRACTITIONER

## 2023-09-12 PROCEDURE — 3078F DIAST BP <80 MM HG: CPT | Mod: CPTII,,, | Performed by: NURSE PRACTITIONER

## 2023-09-12 PROCEDURE — 99213 OFFICE O/P EST LOW 20 MIN: CPT | Performed by: NURSE PRACTITIONER

## 2023-09-12 PROCEDURE — 1160F RVW MEDS BY RX/DR IN RCRD: CPT | Mod: CPTII,,, | Performed by: NURSE PRACTITIONER

## 2023-09-12 PROCEDURE — 3074F PR MOST RECENT SYSTOLIC BLOOD PRESSURE < 130 MM HG: ICD-10-PCS | Mod: CPTII,,, | Performed by: NURSE PRACTITIONER

## 2023-09-12 PROCEDURE — 3044F PR MOST RECENT HEMOGLOBIN A1C LEVEL <7.0%: ICD-10-PCS | Mod: CPTII,,, | Performed by: NURSE PRACTITIONER

## 2023-09-12 PROCEDURE — 1160F PR REVIEW ALL MEDS BY PRESCRIBER/CLIN PHARMACIST DOCUMENTED: ICD-10-PCS | Mod: CPTII,,, | Performed by: NURSE PRACTITIONER

## 2023-09-12 PROCEDURE — 4010F ACE/ARB THERAPY RXD/TAKEN: CPT | Mod: CPTII,,, | Performed by: NURSE PRACTITIONER

## 2023-09-12 PROCEDURE — 3008F BODY MASS INDEX DOCD: CPT | Mod: CPTII,,, | Performed by: NURSE PRACTITIONER

## 2023-09-12 PROCEDURE — 3008F PR BODY MASS INDEX (BMI) DOCUMENTED: ICD-10-PCS | Mod: CPTII,,, | Performed by: NURSE PRACTITIONER

## 2023-09-12 PROCEDURE — 1159F PR MEDICATION LIST DOCUMENTED IN MEDICAL RECORD: ICD-10-PCS | Mod: CPTII,,, | Performed by: NURSE PRACTITIONER

## 2023-09-12 PROCEDURE — 3074F SYST BP LT 130 MM HG: CPT | Mod: CPTII,,, | Performed by: NURSE PRACTITIONER

## 2023-09-12 PROCEDURE — 99214 OFFICE O/P EST MOD 30 MIN: CPT | Mod: S$PBB,,, | Performed by: NURSE PRACTITIONER

## 2023-09-12 PROCEDURE — 4010F PR ACE/ARB THEARPY RXD/TAKEN: ICD-10-PCS | Mod: CPTII,,, | Performed by: NURSE PRACTITIONER

## 2023-09-12 PROCEDURE — 99214 PR OFFICE/OUTPT VISIT, EST, LEVL IV, 30-39 MIN: ICD-10-PCS | Mod: S$PBB,,, | Performed by: NURSE PRACTITIONER

## 2023-09-12 RX ORDER — ALPRAZOLAM 0.5 MG/1
0.5 TABLET ORAL NIGHTLY PRN
Qty: 30 TABLET | Refills: 0 | Status: SHIPPED | OUTPATIENT
Start: 2023-09-12

## 2023-09-12 NOTE — TELEPHONE ENCOUNTER
Wrote patient a portal message.  I will only provide once daily dosing of xanax for 30 days. Further increase in dosage and management needs to come from psych.

## 2023-09-12 NOTE — TELEPHONE ENCOUNTER
Pt called in and stated that he thought that during the office visit you discussed increasing the xanax to BID. He stated the rx that was sent over was just for once nightly. He stated he remembered telling you that he feels he needed it twice a day one for the morning and one for the afternoon. He would like the new rx sent to pharmacy if possible today.

## 2023-10-24 DIAGNOSIS — F41.9 ANXIETY: ICD-10-CM

## 2023-10-25 RX ORDER — ALPRAZOLAM 0.5 MG/1
0.5 TABLET ORAL NIGHTLY PRN
Qty: 30 TABLET | Refills: 0 | OUTPATIENT
Start: 2023-10-25

## 2023-11-20 PROBLEM — G45.9 TIA (TRANSIENT ISCHEMIC ATTACK): Status: RESOLVED | Noted: 2023-08-18 | Resolved: 2023-11-20

## 2024-01-04 RX ORDER — AMLODIPINE BESYLATE 5 MG/1
5 TABLET ORAL DAILY
Qty: 30 TABLET | Refills: 2 | OUTPATIENT
Start: 2024-01-04 | End: 2025-01-03

## 2024-01-04 RX ORDER — HYDROCHLOROTHIAZIDE 12.5 MG/1
12.5 TABLET ORAL DAILY
Qty: 30 TABLET | Refills: 2 | OUTPATIENT
Start: 2024-01-04 | End: 2025-01-03

## 2024-01-04 RX ORDER — ASPIRIN 81 MG/1
81 TABLET ORAL DAILY
Qty: 30 TABLET | Refills: 2 | OUTPATIENT
Start: 2024-01-04 | End: 2025-01-03

## 2024-01-04 NOTE — TELEPHONE ENCOUNTER
----- Message from Cristian Chapman sent at 1/4/2024  1:41 PM CST -----  Regarding: Refill  Contact: wife Kendall  Type:  RX Refill Request    Who Called: wife: Kendall Adonay  Refill or New Rx:refill  RX Name and Strength:amLODIPine (NORVASC) 5 MG tablet  AND  hydroCHLOROthiazide (HYDRODIURIL) 12.5 MG Tab  AND  aspirin (ECOTRIN) 81 MG EC tablet  How is the patient currently taking it? (ex. 1XDay):  Is this a 30 day or 90 day RX:90  Preferred Pharmacy with phone number:  [facility]  [address] [address]  Phone: [phone] Fax: [phone]  Local or Mail Order:local  Ordering Provider:Sarahy Bay  Would the patient rather a call back or a response via [facility]? Refill/ new order  Best Call Back Number:[phone]  Additional Information:

## 2024-01-05 RX ORDER — AMLODIPINE BESYLATE 5 MG/1
5 TABLET ORAL DAILY
Qty: 90 TABLET | Refills: 3 | Status: SHIPPED | OUTPATIENT
Start: 2024-01-05 | End: 2024-01-18 | Stop reason: SDUPTHER

## 2024-01-05 RX ORDER — ASPIRIN 81 MG/1
81 TABLET ORAL DAILY
Qty: 30 TABLET | Refills: 2 | OUTPATIENT
Start: 2024-01-05 | End: 2025-01-04

## 2024-01-05 RX ORDER — AMLODIPINE BESYLATE 5 MG/1
5 TABLET ORAL DAILY
Qty: 30 TABLET | Refills: 2 | OUTPATIENT
Start: 2024-01-05 | End: 2025-01-04

## 2024-01-05 RX ORDER — HYDROCHLOROTHIAZIDE 12.5 MG/1
12.5 TABLET ORAL DAILY
Qty: 90 TABLET | Refills: 3 | Status: SHIPPED | OUTPATIENT
Start: 2024-01-05 | End: 2024-01-18 | Stop reason: SDUPTHER

## 2024-01-05 RX ORDER — HYDROCHLOROTHIAZIDE 12.5 MG/1
12.5 TABLET ORAL DAILY
Qty: 30 TABLET | Refills: 2 | OUTPATIENT
Start: 2024-01-05 | End: 2025-01-04

## 2024-01-05 RX ORDER — ASPIRIN 81 MG/1
81 TABLET ORAL DAILY
Qty: 90 TABLET | Refills: 3 | Status: SHIPPED | OUTPATIENT
Start: 2024-01-05 | End: 2025-01-04

## 2024-01-18 NOTE — TELEPHONE ENCOUNTER
----- Message from Bradford Harrison sent at 1/18/2024 11:53 AM CST -----  Contact: Spouse/Kendall  Type:  RX Refill Request    Who Called:  Spouse/Kendall  Refill or New Rx:  Refill  RX Name and Strength:    amLODIPine (NORVASC) 5 MG tablet  hydroCHLOROthiazide (HYDRODIURIL) 12.5 MG Tab    metoprolol tartrate (LOPRESSOR) 25 MG tablet    How is the patient currently taking it? (ex. 1XDay):  as directed   Is this a 30 day or 90 day RX:  90  Preferred Pharmacy with phone number:    [facility] [place] [address] [address]  Phone: [phone] Fax: [phone]      Local or Mail Order:  Local  Ordering Provider:  Phu Turner Call Back Number: [phone]   Additional Information:

## 2024-01-21 RX ORDER — AMLODIPINE BESYLATE 5 MG/1
5 TABLET ORAL DAILY
Qty: 90 TABLET | Refills: 3 | Status: SHIPPED | OUTPATIENT
Start: 2024-01-21 | End: 2025-01-20

## 2024-01-21 RX ORDER — HYDROCHLOROTHIAZIDE 12.5 MG/1
12.5 TABLET ORAL DAILY
Qty: 90 TABLET | Refills: 3 | Status: SHIPPED | OUTPATIENT
Start: 2024-01-21 | End: 2025-01-20

## 2024-03-06 DIAGNOSIS — I10 PRIMARY HYPERTENSION: ICD-10-CM

## 2024-03-06 RX ORDER — VALSARTAN 320 MG/1
320 TABLET ORAL DAILY
Qty: 90 TABLET | Refills: 1 | Status: SHIPPED | OUTPATIENT
Start: 2024-03-06 | End: 2025-03-06

## 2024-05-22 ENCOUNTER — TELEPHONE (OUTPATIENT)
Dept: FAMILY MEDICINE | Facility: CLINIC | Age: 39
End: 2024-05-22
Payer: MEDICAID

## 2024-05-22 DIAGNOSIS — I10 PRIMARY HYPERTENSION: ICD-10-CM

## 2024-05-22 DIAGNOSIS — E78.1 HYPERTRIGLYCERIDEMIA: ICD-10-CM

## 2024-05-22 DIAGNOSIS — R73.01 IMPAIRED FASTING GLUCOSE: Primary | ICD-10-CM

## 2024-05-22 DIAGNOSIS — E78.2 MIXED HYPERLIPIDEMIA: ICD-10-CM

## 2024-05-24 ENCOUNTER — LAB VISIT (OUTPATIENT)
Dept: LAB | Facility: HOSPITAL | Age: 39
End: 2024-05-24
Attending: NURSE PRACTITIONER
Payer: MEDICAID

## 2024-05-24 DIAGNOSIS — E78.1 HYPERTRIGLYCERIDEMIA: ICD-10-CM

## 2024-05-24 DIAGNOSIS — I10 PRIMARY HYPERTENSION: ICD-10-CM

## 2024-05-24 DIAGNOSIS — E78.2 MIXED HYPERLIPIDEMIA: ICD-10-CM

## 2024-05-24 DIAGNOSIS — R73.01 IMPAIRED FASTING GLUCOSE: ICD-10-CM

## 2024-05-24 LAB
ALBUMIN SERPL BCP-MCNC: 5 G/DL (ref 3.5–5.2)
ALP SERPL-CCNC: 72 U/L (ref 55–135)
ALT SERPL W/O P-5'-P-CCNC: 99 U/L (ref 10–44)
ANION GAP SERPL CALC-SCNC: 12 MMOL/L (ref 8–16)
AST SERPL-CCNC: 177 U/L (ref 10–40)
BASOPHILS # BLD AUTO: 0.1 K/UL (ref 0–0.2)
BASOPHILS NFR BLD: 1 % (ref 0–1.9)
BILIRUB SERPL-MCNC: 1.1 MG/DL (ref 0.1–1)
BUN SERPL-MCNC: 21 MG/DL (ref 6–20)
CALCIUM SERPL-MCNC: 9.6 MG/DL (ref 8.7–10.5)
CHLORIDE SERPL-SCNC: 101 MMOL/L (ref 95–110)
CHOLEST SERPL-MCNC: 195 MG/DL (ref 120–199)
CHOLEST/HDLC SERPL: 4.8 {RATIO} (ref 2–5)
CO2 SERPL-SCNC: 22 MMOL/L (ref 23–29)
CREAT SERPL-MCNC: 1.2 MG/DL (ref 0.5–1.4)
DIFFERENTIAL METHOD BLD: ABNORMAL
EOSINOPHIL # BLD AUTO: 0.3 K/UL (ref 0–0.5)
EOSINOPHIL NFR BLD: 2.7 % (ref 0–8)
ERYTHROCYTE [DISTWIDTH] IN BLOOD BY AUTOMATED COUNT: 12.7 % (ref 11.5–14.5)
EST. GFR  (NO RACE VARIABLE): >60 ML/MIN/1.73 M^2
ESTIMATED AVG GLUCOSE: 103 MG/DL (ref 68–131)
GLUCOSE SERPL-MCNC: 107 MG/DL (ref 70–110)
HBA1C MFR BLD: 5.2 % (ref 4.5–6.2)
HCT VFR BLD AUTO: 46.9 % (ref 40–54)
HDLC SERPL-MCNC: 41 MG/DL (ref 40–75)
HDLC SERPL: 21 % (ref 20–50)
HGB BLD-MCNC: 16.6 G/DL (ref 14–18)
IMM GRANULOCYTES # BLD AUTO: 0.09 K/UL (ref 0–0.04)
IMM GRANULOCYTES NFR BLD AUTO: 0.9 % (ref 0–0.5)
LDLC SERPL CALC-MCNC: 112.2 MG/DL (ref 63–159)
LYMPHOCYTES # BLD AUTO: 1.8 K/UL (ref 1–4.8)
LYMPHOCYTES NFR BLD: 17.6 % (ref 18–48)
MCH RBC QN AUTO: 32.5 PG (ref 27–31)
MCHC RBC AUTO-ENTMCNC: 35.4 G/DL (ref 32–36)
MCV RBC AUTO: 92 FL (ref 82–98)
MONOCYTES # BLD AUTO: 0.8 K/UL (ref 0.3–1)
MONOCYTES NFR BLD: 7.6 % (ref 4–15)
NEUTROPHILS # BLD AUTO: 7.3 K/UL (ref 1.8–7.7)
NEUTROPHILS NFR BLD: 70.2 % (ref 38–73)
NONHDLC SERPL-MCNC: 154 MG/DL
NRBC BLD-RTO: 0 /100 WBC
PLATELET # BLD AUTO: 252 K/UL (ref 150–450)
PMV BLD AUTO: 10.6 FL (ref 9.2–12.9)
POTASSIUM SERPL-SCNC: 3.8 MMOL/L (ref 3.5–5.1)
PROT SERPL-MCNC: 7.9 G/DL (ref 6–8.4)
RBC # BLD AUTO: 5.1 M/UL (ref 4.6–6.2)
SODIUM SERPL-SCNC: 135 MMOL/L (ref 136–145)
TRIGL SERPL-MCNC: 209 MG/DL (ref 30–150)
WBC # BLD AUTO: 10.41 K/UL (ref 3.9–12.7)

## 2024-05-24 PROCEDURE — 83036 HEMOGLOBIN GLYCOSYLATED A1C: CPT | Performed by: NURSE PRACTITIONER

## 2024-05-24 PROCEDURE — 36415 COLL VENOUS BLD VENIPUNCTURE: CPT | Performed by: NURSE PRACTITIONER

## 2024-05-24 PROCEDURE — 85025 COMPLETE CBC W/AUTO DIFF WBC: CPT | Performed by: NURSE PRACTITIONER

## 2024-05-24 PROCEDURE — 80061 LIPID PANEL: CPT | Performed by: NURSE PRACTITIONER

## 2024-05-24 PROCEDURE — 80053 COMPREHEN METABOLIC PANEL: CPT | Performed by: NURSE PRACTITIONER

## 2024-05-28 ENCOUNTER — OFFICE VISIT (OUTPATIENT)
Dept: FAMILY MEDICINE | Facility: CLINIC | Age: 39
End: 2024-05-28
Payer: MEDICAID

## 2024-05-28 VITALS
WEIGHT: 268 LBS | SYSTOLIC BLOOD PRESSURE: 120 MMHG | TEMPERATURE: 98 F | HEIGHT: 72 IN | OXYGEN SATURATION: 99 % | DIASTOLIC BLOOD PRESSURE: 80 MMHG | BODY MASS INDEX: 36.3 KG/M2 | HEART RATE: 84 BPM

## 2024-05-28 DIAGNOSIS — F41.9 ANXIETY: ICD-10-CM

## 2024-05-28 DIAGNOSIS — E66.01 CLASS 2 SEVERE OBESITY DUE TO EXCESS CALORIES WITH SERIOUS COMORBIDITY AND BODY MASS INDEX (BMI) OF 36.0 TO 36.9 IN ADULT: ICD-10-CM

## 2024-05-28 DIAGNOSIS — J30.1 ALLERGIC RHINITIS DUE TO POLLEN, UNSPECIFIED SEASONALITY: ICD-10-CM

## 2024-05-28 DIAGNOSIS — E78.2 MIXED HYPERLIPIDEMIA: ICD-10-CM

## 2024-05-28 DIAGNOSIS — E78.1 HYPERTRIGLYCERIDEMIA: ICD-10-CM

## 2024-05-28 DIAGNOSIS — I10 PRIMARY HYPERTENSION: ICD-10-CM

## 2024-05-28 DIAGNOSIS — R74.8 ELEVATED LIVER ENZYMES: Primary | ICD-10-CM

## 2024-05-28 PROCEDURE — 4010F ACE/ARB THERAPY RXD/TAKEN: CPT | Mod: CPTII,,, | Performed by: NURSE PRACTITIONER

## 2024-05-28 PROCEDURE — 3079F DIAST BP 80-89 MM HG: CPT | Mod: CPTII,,, | Performed by: NURSE PRACTITIONER

## 2024-05-28 PROCEDURE — 99214 OFFICE O/P EST MOD 30 MIN: CPT | Mod: PBBFAC,PN | Performed by: NURSE PRACTITIONER

## 2024-05-28 PROCEDURE — 1159F MED LIST DOCD IN RCRD: CPT | Mod: CPTII,,, | Performed by: NURSE PRACTITIONER

## 2024-05-28 PROCEDURE — 3061F NEG MICROALBUMINURIA REV: CPT | Mod: CPTII,,, | Performed by: NURSE PRACTITIONER

## 2024-05-28 PROCEDURE — 3044F HG A1C LEVEL LT 7.0%: CPT | Mod: CPTII,,, | Performed by: NURSE PRACTITIONER

## 2024-05-28 PROCEDURE — 3066F NEPHROPATHY DOC TX: CPT | Mod: CPTII,,, | Performed by: NURSE PRACTITIONER

## 2024-05-28 PROCEDURE — 3008F BODY MASS INDEX DOCD: CPT | Mod: CPTII,,, | Performed by: NURSE PRACTITIONER

## 2024-05-28 PROCEDURE — 99214 OFFICE O/P EST MOD 30 MIN: CPT | Mod: S$PBB,,, | Performed by: NURSE PRACTITIONER

## 2024-05-28 PROCEDURE — 1160F RVW MEDS BY RX/DR IN RCRD: CPT | Mod: CPTII,,, | Performed by: NURSE PRACTITIONER

## 2024-05-28 PROCEDURE — 3074F SYST BP LT 130 MM HG: CPT | Mod: CPTII,,, | Performed by: NURSE PRACTITIONER

## 2024-05-28 PROCEDURE — 99999 PR PBB SHADOW E&M-EST. PATIENT-LVL IV: CPT | Mod: PBBFAC,,, | Performed by: NURSE PRACTITIONER

## 2024-05-28 RX ORDER — ALPRAZOLAM 0.5 MG/1
0.5 TABLET ORAL NIGHTLY PRN
Qty: 30 TABLET | Refills: 0 | Status: SHIPPED | OUTPATIENT
Start: 2024-05-28

## 2024-05-28 RX ORDER — ATORVASTATIN CALCIUM 10 MG/1
10 TABLET, FILM COATED ORAL DAILY
Qty: 90 TABLET | Refills: 3 | Status: SHIPPED | OUTPATIENT
Start: 2024-05-28 | End: 2025-05-28

## 2024-05-28 RX ORDER — FLUTICASONE PROPIONATE 50 MCG
1 SPRAY, SUSPENSION (ML) NASAL DAILY
Qty: 16 G | Refills: 6 | Status: SHIPPED | OUTPATIENT
Start: 2024-05-28

## 2024-05-28 NOTE — PROGRESS NOTES
Subjective:       Patient ID: Nabil Urias is a 38 y.o. male.    Chief Complaint: Anxiety and Fatigue    Patient presents today following up for hypertension, fatigue, dizziness, headache, and history of DVT and PE.   Labs have been completed and liver enzymes are high. Patient states he stopped drinking alcohol 2 weeks ago. Patient denies abdominal pain, nausea, and vomiting.   Blood pressure has been sporadic and patient admits to forgetting to take his medication in the evening. He never returned when he was referred to psych because he reports the staff stated he would not be prescribed any medication before of after the appointment. Patient states he was poorly treated and did not find helpful. Patient has used the xanax last prescribed sparingly he states. He uses for sleep at night which helps calms him.       Hypertension  This is a chronic problem. The current episode started more than 1 month ago. The problem has been waxing and waning since onset. The problem is uncontrolled. Associated symptoms include headaches. Pertinent negatives include no neck pain, palpitations or peripheral edema. Risk factors for coronary artery disease include sedentary lifestyle, male gender, obesity, dyslipidemia and stress. Past treatments include lifestyle changes, beta blockers and angiotensin blockers. The current treatment provides significant improvement. Compliance problems include exercise and diet.  There is no history of angina.   Fatigue  This is a recurrent problem. The current episode started more than 1 month ago. The problem occurs daily. The problem has been waxing and waning. Associated symptoms include fatigue and headaches. Pertinent negatives include no abdominal pain, arthralgias, chills, congestion, coughing, diaphoresis, fever, nausea, neck pain, rash, sore throat, vomiting or weakness. The symptoms are aggravated by stress and exertion. He has tried lying down, rest, position changes and relaxation for  the symptoms. The treatment provided moderate relief.     Review of Systems   Constitutional:  Positive for activity change and fatigue. Negative for appetite change, chills, diaphoresis, fever and unexpected weight change.        Obesity   HENT:  Negative for congestion, ear discharge, ear pain, hearing loss, sore throat, tinnitus, trouble swallowing and voice change.    Eyes:  Negative for photophobia, pain and visual disturbance.   Respiratory:  Negative for cough and chest tightness.    Cardiovascular:  Negative for palpitations and leg swelling.        Hypertension, hyperlipidemia   Gastrointestinal:  Negative for abdominal pain, constipation, diarrhea, nausea and vomiting.   Endocrine: Negative for cold intolerance and heat intolerance.        Elevated fasting glucose   Genitourinary:  Negative for difficulty urinating, dysuria and flank pain.   Musculoskeletal:  Negative for arthralgias, gait problem and neck pain.   Skin:  Negative for rash.   Allergic/Immunologic: Negative for immunocompromised state.   Neurological:  Positive for dizziness and headaches. Negative for weakness and light-headedness.   Hematological:  Negative for adenopathy.   Psychiatric/Behavioral:  Positive for sleep disturbance. Negative for agitation, confusion, self-injury and suicidal ideas. The patient is nervous/anxious.        Past Medical History:   Diagnosis Date    Atrial fibrillation     Essential (primary) hypertension     History of cardiac radiofrequency ablation     Hyperlipidemia       Past Surgical History:   Procedure Laterality Date    ABLATION OF ARRHYTHMOGENIC FOCUS FOR ATRIAL FIBRILLATION N/A 1/21/2021    Procedure: ABLATION, ARRHYTHMOGENIC FOCUS, FOR ATRIAL FIBRILLATION;  Surgeon: Daniel Handy MD;  Location: [facility] EP LAB;  Service: Cardiology;  Laterality: N/A;  AF, PVI, GABRIELA (Cx if SR), PVI, RFA, CARTO, GEN, GP, 3 PREP    CARDIOVERSION      TONSILLECTOMY  1990       Family History   Problem Relation Name Age of  Onset    Heart attack Maternal Grandfather         Social History     Socioeconomic History    Marital status:    Tobacco Use    Smoking status: Former     Current packs/day: 0.00     Average packs/day: 1 pack/day for 5.0 years (5.0 ttl pk-yrs)     Types: Cigarettes     Start date:      Quit date: 2015     Years since quittin.6    Smokeless tobacco: Current     Types: Chew    Tobacco comments:     1/2 can chew qd   Substance and Sexual Activity    Alcohol use: Yes     Alcohol/week: 4.0 standard drinks of alcohol     Types: 4 Cans of beer per week     Comment: rarely    Drug use: Yes     Frequency: 1.0 times per week     Types: Marijuana     Comment: Hx: Heroin (last use 2012) smokes weed ocassionally     Sexual activity: Yes     Partners: Female   Social History Narrative    ** Merged History Encounter **          Social Determinants of Health     Transportation Needs: No Transportation Needs (2023)    PRAPARE - Transportation     Lack of Transportation (Medical): No     Lack of Transportation (Non-Medical): No   Stress: Stress Concern Present (2020)    [place] [employer] - Occupational Stress Questionnaire     Feeling of Stress : To some extent       Current Outpatient Medications   Medication Sig Dispense Refill    amLODIPine (NORVASC) 5 MG tablet Take 1 tablet (5 mg total) by mouth once daily. 90 tablet 3    apixaban (ELIQUIS) 5 mg Tab Take 1 tablet (5 mg total) by mouth 2 (two) times daily. 60 tablet 11    aspirin (ECOTRIN) 81 MG EC tablet Take 1 tablet (81 mg total) by mouth once daily. 90 tablet 3    fenofibrate 160 MG Tab Take 1 tablet (160 mg total) by mouth once daily. 30 tablet 2    hydroCHLOROthiazide (HYDRODIURIL) 12.5 MG Tab Take 1 tablet (12.5 mg total) by mouth once daily. 90 tablet 3    ibuprofen (ADVIL,MOTRIN) 200 MG tablet Take 200 mg by mouth every 6 (six) hours as needed for Pain.      magnesium oxide (MAG-OX) 400 mg (241.3 mg magnesium)  tablet Take 1 tablet (400 mg total) by mouth once daily. 90 tablet 3    metoprolol tartrate (LOPRESSOR) 25 MG tablet Take 1 tablet (25 mg total) by mouth 2 (two) times daily. 60 tablet 11    omeprazole (PRILOSEC) 20 MG capsule Take 20 mg by mouth once daily.      valsartan (DIOVAN) 320 MG tablet Take 1 tablet (320 mg total) by mouth once daily. 90 tablet 1    ALPRAZolam (XANAX) 0.5 MG tablet Take 1 tablet (0.5 mg total) by mouth nightly as needed for Anxiety. 30 tablet 0    atorvastatin (LIPITOR) 10 MG tablet Take 1 tablet (10 mg total) by mouth once daily. 90 tablet 3    fluticasone propionate (FLONASE) 50 mcg/actuation nasal spray 1 spray (50 mcg total) by Each Nostril route once daily. 16 g 6     No current facility-administered medications for this visit.       Review of patient's allergies indicates:  No Known Allergies  Objective:      Blood pressure 120/80, pulse 84, temperature 98.2 °F (36.8 °C), height 6' (1.829 m), weight 121.6 kg (268 lb), SpO2 99%. Body mass index is 36.35 kg/m².   Physical Exam  Vitals and nursing note reviewed.   Constitutional:       General: He is not in acute distress.     Appearance: Normal appearance. He is well-developed. He is obese. He is not ill-appearing.   HENT:      Head: Normocephalic and atraumatic.      Right Ear: Tympanic membrane, ear canal and external ear normal.      Left Ear: Tympanic membrane, ear canal and external ear normal.      Nose: Nose normal.      Mouth/Throat:      Mouth: Mucous membranes are moist.      Pharynx: Uvula midline.   Eyes:      General: Lids are normal.      Extraocular Movements: Extraocular movements intact.      Conjunctiva/sclera: Conjunctivae normal.      Pupils: Pupils are equal, round, and reactive to light.   Cardiovascular:      Rate and Rhythm: Regular rhythm.      Pulses: Normal pulses.      Heart sounds: Normal heart sounds, S1 normal and S2 normal. No murmur heard.  Pulmonary:      Effort: Pulmonary effort is normal. No  respiratory distress.      Breath sounds: Normal breath sounds. No wheezing.   Abdominal:      General: Bowel sounds are normal.      Palpations: Abdomen is soft.      Tenderness: There is no abdominal tenderness.   Musculoskeletal:         General: Normal range of motion.      Cervical back: Normal range of motion and neck supple.   Lymphadenopathy:      Cervical: No cervical adenopathy.   Skin:     General: Skin is warm and dry.      Findings: No rash.   Neurological:      General: No focal deficit present.      Mental Status: He is alert and oriented to person, place, and time. Mental status is at baseline.   Psychiatric:         Mood and Affect: Mood is not anxious or depressed.         Speech: Speech normal.         Behavior: Behavior normal.         Thought Content: Thought content normal.         Cognition and Memory: Cognition normal.             Assessment:       1. Elevated liver enzymes    2. Primary hypertension    3. Hypertriglyceridemia    4. Mixed hyperlipidemia    5. Anxiety    6. Allergic rhinitis due to pollen, unspecified seasonality    7. Class 2 severe obesity due to excess calories with serious comorbidity and body mass index (BMI) of 36.0 to 36.9 in adult          Plan:       Nabil was seen today for anxiety and fatigue.    Diagnoses and all orders for this visit:    Elevated liver enzymes  -     US Abdomen Limited_Liver; Future  -     Lipid Panel; Future    Primary hypertension  -     Comprehensive Metabolic Panel; Future  Lifestyle changes: Reduce the amount of salt in your diet; Lose weight; Avoid drinking too much alcohol; Exercise at least 30 minutes per day most days of the week.  Continue current medications and home BP monitoring.      Hypertriglyceridemia  Limit red meat, butter, fried foods, cheese, and other foods that have a lot of saturated fat. Consume more: lean meats, fish, fruits, vegetables, whole grains, beans, lentils, and nuts.  Weight loss, and 30-45 min of cardiovascular  exercise daily.      Mixed hyperlipidemia  -     atorvastatin (LIPITOR) 10 MG tablet; Take 1 tablet (10 mg total) by mouth once daily.  Limit red meat, butter, fried foods, cheese, and other foods that have a lot of saturated fat. Consume more: lean meats, fish, fruits, vegetables, whole grains, beans, lentils, and nuts.  Weight loss, and 30-45 min of cardiovascular exercise daily.      Anxiety  -     ALPRAZolam (XANAX) 0.5 MG tablet; Take 1 tablet (0.5 mg total) by mouth nightly as needed for Anxiety.    Allergic rhinitis due to pollen, unspecified seasonality  -     fluticasone propionate (FLONASE) 50 mcg/actuation nasal spray; 1 spray (50 mcg total) by Each Nostril route once daily.    Class 2 severe obesity due to excess calories with serious comorbidity and body mass index (BMI) of 36.0 to 36.9 in adult  The patient is asked to make an attempt to improve diet and exercise patterns to aid in medical management of this problem.       Follow up in 3 months with labs prior to office visit. Call insurance to find in network psychiatrist for evaluation and management after many failed medications.     Ordering [employer] testing.

## 2024-05-30 ENCOUNTER — HOSPITAL ENCOUNTER (OUTPATIENT)
Dept: RADIOLOGY | Facility: HOSPITAL | Age: 39
Discharge: HOME | End: 2024-05-30
Attending: NURSE PRACTITIONER
Payer: MEDICAID

## 2024-05-30 DIAGNOSIS — R74.8 ELEVATED LIVER ENZYMES: ICD-10-CM

## 2024-05-30 PROCEDURE — 76705 ECHO EXAM OF ABDOMEN: CPT | Mod: 26,,, | Performed by: RADIOLOGY

## 2024-05-30 PROCEDURE — 76705 ECHO EXAM OF ABDOMEN: CPT | Mod: TC,PO

## 2024-06-10 DIAGNOSIS — F41.9 ANXIETY: ICD-10-CM

## 2024-06-10 RX ORDER — ALPRAZOLAM 0.5 MG/1
0.5 TABLET ORAL NIGHTLY PRN
Qty: 30 TABLET | Refills: 0 | OUTPATIENT
Start: 2024-06-10

## 2024-06-11 ENCOUNTER — CLINICAL SUPPORT (OUTPATIENT)
Dept: URGENT CARE | Facility: CLINIC | Age: 39
End: 2024-06-11

## 2024-06-11 DIAGNOSIS — Z00.00 ROUTINE GENERAL MEDICAL EXAMINATION AT A HEALTH CARE FACILITY: Primary | ICD-10-CM

## 2024-06-11 PROCEDURE — 80306 DRUG TEST PRSMV INSTRMNT: CPT | Mod: S$GLB,,, | Performed by: EMERGENCY MEDICINE

## 2024-07-08 NOTE — TELEPHONE ENCOUNTER
----- Message from Cristian Chapman sent at 7/8/2024  3:45 PM CDT -----  Regarding: pharmacy  Contact: patient  Type:  RX Refill Request    Who Called: patient  Refill or New Rx:refill  RX Name and Strength:apixaban (ELIQUIS) 5 mg Tab  How is the patient currently taking it? (ex. 1XDay):  Is this a 30 day or 90 day RX:90  Preferred Pharmacy with phone number:  [facility] [address] [place] [address]  Phone: [phone] Fax: [phone]  Local or Mail Order:local  Ordering Provider:Chioma  Would the patient rather a call back or a response via [facility]? Please refill today patient forgot to call and is now out of medication  Best Call Back Number:[phone]  Additional Information:

## 2024-08-06 DIAGNOSIS — F41.9 ANXIETY: ICD-10-CM

## 2024-08-08 RX ORDER — ALPRAZOLAM 0.5 MG/1
0.5 TABLET ORAL NIGHTLY PRN
Qty: 30 TABLET | Refills: 0 | Status: SHIPPED | OUTPATIENT
Start: 2024-08-08

## 2024-08-15 ENCOUNTER — PATIENT MESSAGE (OUTPATIENT)
Dept: FAMILY MEDICINE | Facility: CLINIC | Age: 39
End: 2024-08-15
Payer: MEDICAID

## 2024-08-20 ENCOUNTER — LAB VISIT (OUTPATIENT)
Dept: LAB | Facility: HOSPITAL | Age: 39
End: 2024-08-20
Attending: NURSE PRACTITIONER
Payer: MEDICAID

## 2024-08-20 ENCOUNTER — TELEPHONE (OUTPATIENT)
Dept: CARDIOLOGY | Facility: CLINIC | Age: 39
End: 2024-08-20
Payer: MEDICAID

## 2024-08-20 DIAGNOSIS — I10 PRIMARY HYPERTENSION: ICD-10-CM

## 2024-08-20 DIAGNOSIS — R74.8 ELEVATED LIVER ENZYMES: ICD-10-CM

## 2024-08-20 LAB
ALBUMIN SERPL BCP-MCNC: 4.5 G/DL (ref 3.5–5.2)
ALP SERPL-CCNC: 49 U/L (ref 55–135)
ALT SERPL W/O P-5'-P-CCNC: 30 U/L (ref 10–44)
ANION GAP SERPL CALC-SCNC: 6 MMOL/L (ref 8–16)
AST SERPL-CCNC: 25 U/L (ref 10–40)
BILIRUB SERPL-MCNC: 0.7 MG/DL (ref 0.1–1)
BUN SERPL-MCNC: 28 MG/DL (ref 6–20)
CALCIUM SERPL-MCNC: 9.7 MG/DL (ref 8.7–10.5)
CHLORIDE SERPL-SCNC: 101 MMOL/L (ref 95–110)
CHOLEST SERPL-MCNC: 164 MG/DL (ref 120–199)
CHOLEST/HDLC SERPL: 3.9 {RATIO} (ref 2–5)
CO2 SERPL-SCNC: 31 MMOL/L (ref 23–29)
CREAT SERPL-MCNC: 1.4 MG/DL (ref 0.5–1.4)
EST. GFR  (NO RACE VARIABLE): >60 ML/MIN/1.73 M^2
GLUCOSE SERPL-MCNC: 103 MG/DL (ref 70–110)
HDLC SERPL-MCNC: 42 MG/DL (ref 40–75)
HDLC SERPL: 25.6 % (ref 20–50)
LDLC SERPL CALC-MCNC: 101 MG/DL (ref 63–159)
NONHDLC SERPL-MCNC: 122 MG/DL
POTASSIUM SERPL-SCNC: 4.2 MMOL/L (ref 3.5–5.1)
PROT SERPL-MCNC: 7.1 G/DL (ref 6–8.4)
SODIUM SERPL-SCNC: 138 MMOL/L (ref 136–145)
TRIGL SERPL-MCNC: 105 MG/DL (ref 30–150)

## 2024-08-20 PROCEDURE — 80053 COMPREHEN METABOLIC PANEL: CPT | Performed by: NURSE PRACTITIONER

## 2024-08-20 PROCEDURE — 36415 COLL VENOUS BLD VENIPUNCTURE: CPT | Performed by: NURSE PRACTITIONER

## 2024-08-20 PROCEDURE — 80061 LIPID PANEL: CPT | Performed by: NURSE PRACTITIONER

## 2024-08-22 ENCOUNTER — OFFICE VISIT (OUTPATIENT)
Dept: FAMILY MEDICINE | Facility: CLINIC | Age: 39
End: 2024-08-22
Payer: MEDICAID

## 2024-08-22 VITALS
HEIGHT: 72 IN | OXYGEN SATURATION: 97 % | SYSTOLIC BLOOD PRESSURE: 133 MMHG | BODY MASS INDEX: 37.77 KG/M2 | HEART RATE: 94 BPM | WEIGHT: 278.88 LBS | DIASTOLIC BLOOD PRESSURE: 76 MMHG

## 2024-08-22 DIAGNOSIS — R53.83 FATIGUE, UNSPECIFIED TYPE: ICD-10-CM

## 2024-08-22 DIAGNOSIS — R42 DIZZY SPELLS: Primary | ICD-10-CM

## 2024-08-22 DIAGNOSIS — M62.838 MUSCLE SPASM: ICD-10-CM

## 2024-08-22 DIAGNOSIS — R11.0 NAUSEA: ICD-10-CM

## 2024-08-22 DIAGNOSIS — R55 NEAR SYNCOPE: ICD-10-CM

## 2024-08-22 PROCEDURE — 3008F BODY MASS INDEX DOCD: CPT | Mod: CPTII,,, | Performed by: NURSE PRACTITIONER

## 2024-08-22 PROCEDURE — 4010F ACE/ARB THERAPY RXD/TAKEN: CPT | Mod: CPTII,,, | Performed by: NURSE PRACTITIONER

## 2024-08-22 PROCEDURE — 3044F HG A1C LEVEL LT 7.0%: CPT | Mod: CPTII,,, | Performed by: NURSE PRACTITIONER

## 2024-08-22 PROCEDURE — 99999 PR PBB SHADOW E&M-EST. PATIENT-LVL III: CPT | Mod: PBBFAC,,, | Performed by: NURSE PRACTITIONER

## 2024-08-22 PROCEDURE — 3075F SYST BP GE 130 - 139MM HG: CPT | Mod: CPTII,,, | Performed by: NURSE PRACTITIONER

## 2024-08-22 PROCEDURE — 3066F NEPHROPATHY DOC TX: CPT | Mod: CPTII,,, | Performed by: NURSE PRACTITIONER

## 2024-08-22 PROCEDURE — 99214 OFFICE O/P EST MOD 30 MIN: CPT | Mod: S$PBB,,, | Performed by: NURSE PRACTITIONER

## 2024-08-22 PROCEDURE — 1160F RVW MEDS BY RX/DR IN RCRD: CPT | Mod: CPTII,,, | Performed by: NURSE PRACTITIONER

## 2024-08-22 PROCEDURE — 99213 OFFICE O/P EST LOW 20 MIN: CPT | Mod: PBBFAC,PN | Performed by: NURSE PRACTITIONER

## 2024-08-22 PROCEDURE — 3061F NEG MICROALBUMINURIA REV: CPT | Mod: CPTII,,, | Performed by: NURSE PRACTITIONER

## 2024-08-22 PROCEDURE — 1159F MED LIST DOCD IN RCRD: CPT | Mod: CPTII,,, | Performed by: NURSE PRACTITIONER

## 2024-08-22 PROCEDURE — 3078F DIAST BP <80 MM HG: CPT | Mod: CPTII,,, | Performed by: NURSE PRACTITIONER

## 2024-08-22 NOTE — PROGRESS NOTES
"Subjective:       Patient ID: Nabil Urias is a 39 y.o. male.    Chief Complaint: Follow-up (3 mon f/u and lab review )    Patient presents today following up for hypertension, fatigue, dizziness, headache, and history of DVT and PE.   He is taking his xanax sparingly and as discussed.  He still is getting "spells" with symptoms lasting for a very short time (minutes). He describes the symptoms to include hot sweats, dizziness, vision disturbance, nausea, and weakness. He cannot associate the cause with food but has checked his blood sugar several times. Readings are in the 120's he reports after food intake. He has a cardiology appointment upcoming soon. He is not taking his statin as he felt side effects with this. He has made diet changes and lost some weight to help.  He voices a family history of thyroid disease and is requesting labs to check this.  Patient is obese with a BMI of 37.83      Hypertension  This is a chronic problem. The current episode started more than 1 month ago. The problem has been waxing and waning since onset. The problem is uncontrolled. Associated symptoms include headaches. Pertinent negatives include no neck pain, palpitations or peripheral edema. Risk factors for coronary artery disease include sedentary lifestyle, male gender, obesity, dyslipidemia and stress. Past treatments include lifestyle changes, beta blockers and angiotensin blockers. The current treatment provides significant improvement. Compliance problems include exercise and diet.  There is no history of angina.   Fatigue  This is a recurrent problem. The current episode started more than 1 month ago. The problem occurs daily. The problem has been waxing and waning. Associated symptoms include fatigue and headaches. Pertinent negatives include no abdominal pain, arthralgias, chills, congestion, coughing, diaphoresis, fever, nausea, neck pain, rash, sore throat, vomiting or weakness. The symptoms are aggravated by stress " and exertion. He has tried lying down, rest, position changes and relaxation for the symptoms. The treatment provided moderate relief.   Follow-up  Associated symptoms include fatigue and headaches. Pertinent negatives include no abdominal pain, arthralgias, chills, congestion, coughing, diaphoresis, fever, nausea, neck pain, rash, sore throat, vomiting or weakness.     Review of Systems   Constitutional:  Positive for activity change and fatigue. Negative for appetite change, chills, diaphoresis, fever and unexpected weight change.        Obesity   HENT:  Negative for congestion, ear discharge, ear pain, hearing loss, sore throat, tinnitus, trouble swallowing and voice change.    Eyes:  Negative for photophobia, pain and visual disturbance.   Respiratory:  Negative for cough and chest tightness.    Cardiovascular:  Negative for palpitations and leg swelling.        Hypertension, hyperlipidemia   Gastrointestinal:  Negative for abdominal pain, constipation, diarrhea, nausea and vomiting.   Endocrine: Negative for cold intolerance and heat intolerance.        Elevated fasting glucose   Genitourinary:  Negative for difficulty urinating, dysuria and flank pain.   Musculoskeletal:  Negative for arthralgias, gait problem and neck pain.   Skin:  Negative for rash.   Allergic/Immunologic: Negative for immunocompromised state.   Neurological:  Positive for dizziness and headaches. Negative for weakness and light-headedness.   Hematological:  Negative for adenopathy.   Psychiatric/Behavioral:  Positive for sleep disturbance. Negative for agitation, confusion, self-injury and suicidal ideas. The patient is nervous/anxious.        Past Medical History:   Diagnosis Date    Atrial fibrillation     Essential (primary) hypertension     History of cardiac radiofrequency ablation     Hyperlipidemia       Past Surgical History:   Procedure Laterality Date    ABLATION OF ARRHYTHMOGENIC FOCUS FOR ATRIAL FIBRILLATION N/A 1/21/2021     Procedure: ABLATION, ARRHYTHMOGENIC FOCUS, FOR ATRIAL FIBRILLATION;  Surgeon: Daniel Handy MD;  Location: [facility] EP LAB;  Service: Cardiology;  Laterality: N/A;  AF, PVI, GABRIELA (Cx if SR), PVI, RFA, CARTO, GEN, GP, 3 PREP    CARDIOVERSION      TONSILLECTOMY         Family History   Problem Relation Name Age of Onset    Heart attack Maternal Grandfather         Social History     Socioeconomic History    Marital status:    Tobacco Use    Smoking status: Former     Current packs/day: 0.00     Average packs/day: 1 pack/day for 5.0 years (5.0 ttl pk-yrs)     Types: Cigarettes     Start date:      Quit date: 2015     Years since quittin.9    Smokeless tobacco: Current     Types: Chew    Tobacco comments:     1/2 can chew qd   Substance and Sexual Activity    Alcohol use: Yes     Alcohol/week: 4.0 standard drinks of alcohol     Types: 4 Cans of beer per week     Comment: rarely    Drug use: Yes     Frequency: 1.0 times per week     Types: Marijuana     Comment: Hx: Heroin (last use 2012) smokes weed ocassionally     Sexual activity: Yes     Partners: Female   Social History Narrative    ** Merged History Encounter **          Social Determinants of Health     Transportation Needs: No Transportation Needs (2023)    PRAPARE - Transportation     Lack of Transportation (Medical): No     Lack of Transportation (Non-Medical): No   Stress: Stress Concern Present (2020)    [employer] - Occupational Stress Questionnaire     Feeling of Stress : To some extent       Current Outpatient Medications   Medication Sig Dispense Refill    ALPRAZolam (XANAX) 0.5 MG tablet Take 1 tablet (0.5 mg total) by mouth nightly as needed for Anxiety. 30 tablet 0    apixaban (ELIQUIS) 5 mg Tab Take 1 tablet (5 mg total) by mouth 2 (two) times daily. 180 tablet 3    aspirin (ECOTRIN) 81 MG EC tablet Take 1 tablet (81 mg total) by mouth once daily. 90 tablet 3    fenofibrate 160 MG Tab  Take 1 tablet (160 mg total) by mouth once daily. 30 tablet 2    hydroCHLOROthiazide (HYDRODIURIL) 12.5 MG Tab Take 1 tablet (12.5 mg total) by mouth once daily. 90 tablet 3    ibuprofen (ADVIL,MOTRIN) 200 MG tablet Take 200 mg by mouth every 6 (six) hours as needed for Pain.      magnesium oxide (MAG-OX) 400 mg (241.3 mg magnesium) tablet Take 1 tablet (400 mg total) by mouth once daily. 90 tablet 3    omeprazole (PRILOSEC) 20 MG capsule Take 20 mg by mouth once daily.      valsartan (DIOVAN) 320 MG tablet Take 1 tablet (320 mg total) by mouth once daily. 90 tablet 1    atorvastatin (LIPITOR) 10 MG tablet Take 1 tablet (10 mg total) by mouth once daily. (Patient not taking: Reported on 8/22/2024) 90 tablet 3    fluticasone propionate (FLONASE) 50 mcg/actuation nasal spray 1 spray (50 mcg total) by Each Nostril route once daily. (Patient not taking: Reported on 8/22/2024) 16 g 6    metoprolol tartrate (LOPRESSOR) 25 MG tablet Take 1 tablet (25 mg total) by mouth 2 (two) times daily. (Patient not taking: Reported on 8/22/2024) 60 tablet 11     No current facility-administered medications for this visit.       Review of patient's allergies indicates:  No Known Allergies  Objective:      Blood pressure 133/76, pulse 94, height 6' (1.829 m), weight 126.5 kg (278 lb 14.4 oz), SpO2 97%. Body mass index is 37.83 kg/m².   Physical Exam  Vitals and nursing note reviewed.   Constitutional:       General: He is not in acute distress.     Appearance: Normal appearance. He is well-developed. He is obese. He is not ill-appearing.   HENT:      Head: Normocephalic and atraumatic.      Right Ear: Tympanic membrane, ear canal and external ear normal.      Left Ear: Tympanic membrane, ear canal and external ear normal.      Nose: Nose normal.      Mouth/Throat:      Mouth: Mucous membranes are moist.      Pharynx: Uvula midline.   Eyes:      General: Lids are normal.      Extraocular Movements: Extraocular movements intact.       Conjunctiva/sclera: Conjunctivae normal.      Pupils: Pupils are equal, round, and reactive to light.   Cardiovascular:      Rate and Rhythm: Regular rhythm.      Pulses: Normal pulses.      Heart sounds: Normal heart sounds, S1 normal and S2 normal. No murmur heard.  Pulmonary:      Effort: Pulmonary effort is normal. No respiratory distress.      Breath sounds: Normal breath sounds. No wheezing.   Abdominal:      General: Bowel sounds are normal.      Palpations: Abdomen is soft. There is no mass.      Tenderness: There is no abdominal tenderness.   Musculoskeletal:         General: Normal range of motion.      Cervical back: Normal range of motion and neck supple.   Lymphadenopathy:      Cervical: No cervical adenopathy.   Skin:     General: Skin is warm and dry.      Findings: No rash.   Neurological:      General: No focal deficit present.      Mental Status: He is alert and oriented to person, place, and time. Mental status is at baseline.   Psychiatric:         Mood and Affect: Mood is not anxious or depressed.         Speech: Speech normal.         Behavior: Behavior normal.         Thought Content: Thought content normal.         Cognition and Memory: Cognition normal.             Assessment:       1. Dizzy spells    2. Nausea    3. Muscle spasm    4. Near syncope    5. Fatigue, unspecified type            Plan:       Nabil was seen today for follow-up.    Diagnoses and all orders for this visit:    Dizzy spells  -     Testosterone Panel; Future  -     TSH; Future    Nausea    Muscle spasm    Near syncope  -     CORTISOL, 8AM; Future  -     Testosterone Panel; Future  -     TSH; Future    Fatigue, unspecified type  -     CORTISOL, 8AM; Future  -     Testosterone Panel; Future  -     TSH; Future         I spent a total of 30 minutes on the day of the visit.  This includes face to face time and non-face to face time preparing to see the patient (eg, review of tests), obtaining and/or reviewing separately  obtained history, documenting clinical information in the electronic or other health record, independently interpreting results and communicating results to the patient/family/caregiver, or care coordinator.     Continue same medications  See cardiology  Track symptoms and keep diary  Obtain labs   If labs are normal and cardiology states well, refer to neurology.    Follow up in 3 months for refills and check up.

## 2024-08-26 ENCOUNTER — OFFICE VISIT (OUTPATIENT)
Dept: CARDIOLOGY | Facility: CLINIC | Age: 39
End: 2024-08-26
Payer: MEDICAID

## 2024-08-26 VITALS
OXYGEN SATURATION: 97 % | HEIGHT: 72 IN | WEIGHT: 278.06 LBS | DIASTOLIC BLOOD PRESSURE: 83 MMHG | SYSTOLIC BLOOD PRESSURE: 118 MMHG | HEART RATE: 100 BPM | BODY MASS INDEX: 37.66 KG/M2

## 2024-08-26 DIAGNOSIS — E78.2 MIXED HYPERLIPIDEMIA: ICD-10-CM

## 2024-08-26 DIAGNOSIS — Z79.01 CURRENT USE OF LONG TERM ANTICOAGULATION: ICD-10-CM

## 2024-08-26 DIAGNOSIS — E86.0 DEHYDRATION: ICD-10-CM

## 2024-08-26 DIAGNOSIS — Z71.2 ENCOUNTER TO DISCUSS TEST RESULTS: ICD-10-CM

## 2024-08-26 DIAGNOSIS — K21.9 GASTROESOPHAGEAL REFLUX DISEASE WITHOUT ESOPHAGITIS: ICD-10-CM

## 2024-08-26 DIAGNOSIS — G47.33 OSA (OBSTRUCTIVE SLEEP APNEA): ICD-10-CM

## 2024-08-26 DIAGNOSIS — I48.0 PAROXYSMAL ATRIAL FIBRILLATION: ICD-10-CM

## 2024-08-26 DIAGNOSIS — F41.9 ANXIETY: ICD-10-CM

## 2024-08-26 DIAGNOSIS — I10 PRIMARY HYPERTENSION: ICD-10-CM

## 2024-08-26 DIAGNOSIS — F51.01 PRIMARY INSOMNIA: ICD-10-CM

## 2024-08-26 DIAGNOSIS — R06.09 DYSPNEA ON EXERTION: Primary | ICD-10-CM

## 2024-08-26 PROCEDURE — 3074F SYST BP LT 130 MM HG: CPT | Mod: CPTII,,, | Performed by: NURSE PRACTITIONER

## 2024-08-26 PROCEDURE — 3044F HG A1C LEVEL LT 7.0%: CPT | Mod: CPTII,,, | Performed by: NURSE PRACTITIONER

## 2024-08-26 PROCEDURE — 3061F NEG MICROALBUMINURIA REV: CPT | Mod: CPTII,,, | Performed by: NURSE PRACTITIONER

## 2024-08-26 PROCEDURE — 99213 OFFICE O/P EST LOW 20 MIN: CPT | Mod: PBBFAC,PN | Performed by: NURSE PRACTITIONER

## 2024-08-26 PROCEDURE — 99999 PR PBB SHADOW E&M-EST. PATIENT-LVL III: CPT | Mod: PBBFAC,,, | Performed by: NURSE PRACTITIONER

## 2024-08-26 PROCEDURE — 99214 OFFICE O/P EST MOD 30 MIN: CPT | Mod: S$PBB,,, | Performed by: NURSE PRACTITIONER

## 2024-08-26 PROCEDURE — 3008F BODY MASS INDEX DOCD: CPT | Mod: CPTII,,, | Performed by: NURSE PRACTITIONER

## 2024-08-26 PROCEDURE — 1160F RVW MEDS BY RX/DR IN RCRD: CPT | Mod: CPTII,,, | Performed by: NURSE PRACTITIONER

## 2024-08-26 PROCEDURE — 3079F DIAST BP 80-89 MM HG: CPT | Mod: CPTII,,, | Performed by: NURSE PRACTITIONER

## 2024-08-26 PROCEDURE — 4010F ACE/ARB THERAPY RXD/TAKEN: CPT | Mod: CPTII,,, | Performed by: NURSE PRACTITIONER

## 2024-08-26 PROCEDURE — 3066F NEPHROPATHY DOC TX: CPT | Mod: CPTII,,, | Performed by: NURSE PRACTITIONER

## 2024-08-26 PROCEDURE — 1159F MED LIST DOCD IN RCRD: CPT | Mod: CPTII,,, | Performed by: NURSE PRACTITIONER

## 2024-08-26 RX ORDER — VALSARTAN 80 MG/1
80 TABLET ORAL DAILY
Qty: 30 TABLET | Refills: 3 | Status: SHIPPED | OUTPATIENT
Start: 2024-08-26 | End: 2025-08-26

## 2024-08-26 RX ORDER — HYDROCHLOROTHIAZIDE 12.5 MG/1
12.5 TABLET ORAL DAILY
Qty: 90 TABLET | Refills: 3 | Status: SHIPPED | OUTPATIENT
Start: 2024-08-26 | End: 2025-08-26

## 2024-08-26 NOTE — ASSESSMENT & PLAN NOTE
Taking xanax as needed, rarely needs to take;  Anxiety level still issue, no change  Seeing PCP for this

## 2024-08-26 NOTE — ASSESSMENT & PLAN NOTE
Continue Eliquis 5 mg BID; stopped metoprolol- caused fatigue  Continue magnesium oxide 400 mg daily  Has occasional random periods dizziness, will feel palpitation up to the neck area, but no heart racing; was occurring more frequently when was on full dose valsartan; dropped dose to half tab and noticed big improvement in symptoms

## 2024-08-26 NOTE — PROGRESS NOTES
Subjective:    Patient ID:  Nabil Urias is a 39 y.o. male patient here for evaluation Medication Reaction (Dizziness  valsartan currently intake by pt 160 MG )    History of Present Illness:  Patient was in clinic for annual follow up.  His only complaint today is episodes of dizziness that he associates with taking the valsartan.  He started taking half of valsartan 320 mg and has noticed an improvement in the dizziness.  He also states on days where he has held the valsartan that he has no dizziness at all.  He denies chest pain or shortness of breath.  He was compliant with Eliquis.  He stopped taking metoprolol stating made him feel bad.  He was working out at the gym.  He still is dealing with anxiety and insomnia.  States he did follow up with sleep medicine and needed a CPAP but can not tolerate it.            Most Recent Echocardiogram Results  Results for orders placed during the hospital encounter of 08/17/23    Echo    Interpretation Summary    Left Ventricle: The left ventricle is normal in size. Ventricular mass is normal. Normal wall thickness. Normal wall motion. There is normal systolic function with a visually estimated ejection fraction of 55 - 70%.  EF 57% There is normal diastolic function. Normal left ventricular filling pressure. Tissue Doppler velocity is normal.    Right Ventricle: Normal right ventricular cavity size. Right ventricle wall motion  is normal. Systolic function is normal.    Aortic Valve: The aortic valve is a trileaflet valve.    Mitral Valve: There is no stenosis. The mean pressure gradient across the mitral valve is 2 mmHg at a heart rate of  bpm.    IVC/SVC: Normal venous pressure at 3 mmHg.    Patient is in normal sinus rhythm      Most Recent Nuclear Stress Test Results  Results for orders placed during the hospital encounter of 03/23/23    Nuclear Stress - Cardiology Interpreted    Interpretation Summary    Normal myocardial perfusion scan. There is no evidence of  myocardial ischemia or infarction.    There is a  mild intensity fixed perfusion abnormality in the inferior wall of the left ventricle secondary to diaphragm attenuation.    There is moderate apical thinning which is a normal variant.    The gated perfusion images showed an ejection fraction of 50% at rest. The gated perfusion images showed an ejection fraction of 59% post stress. Normal ejection fraction is greater than 53%.    There is normal wall motion post stress.    There is mild global hypokinesis at rest.    LV cavity size is normal at rest and normal at stress.    The ECG portion of the study is negative for ischemia.    The patient reported no chest pain during the stress test.    There were no arrhythmias during stress.    The patient exercised for 7 minutes 5 seconds on a Jeremiah protocol, corresponding to a functional capacity of 10 METS, achieving a peak heart rate of 168 bpm, which is 91 % of the age predicted maximum heart rate.      Most Recent Cardiac PET Stress Test Results  No results found for this or any previous visit.      Most Recent Cardiovascular Angiogram results  No results found for this or any previous visit.      Other Most Recent Cardiology Results  Results for orders placed during the hospital encounter of 08/17/23    CARDIAC MONITORING STRIPS      REVIEW OF SYSTEMS: As noted in HPI   CARDIOVASCULAR: No recent chest pain, palpitations, arm/neck/jaw pain, or edema.  RESPIRATORY: No recent fever, cough, SOB.  : No blood in the urine  GI: No reflux, nausea, vomiting, or blood in stool.   MUSCULOSKELETAL: No falls.   NEURO: No headaches, syncope, or dizziness.  EYES: No sudden changes in vision.     Past Medical History:   Diagnosis Date    Atrial fibrillation     Essential (primary) hypertension     History of cardiac radiofrequency ablation     Hyperlipidemia      Past Surgical History:   Procedure Laterality Date    ABLATION OF ARRHYTHMOGENIC FOCUS FOR ATRIAL FIBRILLATION N/A  2021    Procedure: ABLATION, ARRHYTHMOGENIC FOCUS, FOR ATRIAL FIBRILLATION;  Surgeon: Daniel Handy MD;  Location: [facility] EP LAB;  Service: Cardiology;  Laterality: N/A;  AF, PVI, GABRIELA (Cx if SR), PVI, RFA, CARTO, GEN, GP, 3 PREP    CARDIOVERSION      TONSILLECTOMY       Social History     Tobacco Use    Smoking status: Former     Current packs/day: 0.00     Average packs/day: 1 pack/day for 5.0 years (5.0 ttl pk-yrs)     Types: Cigarettes     Start date:      Quit date: 2015     Years since quittin.9    Smokeless tobacco: Current     Types: Chew    Tobacco comments:     1/2 can chew qd   Substance Use Topics    Alcohol use: Yes     Alcohol/week: 4.0 standard drinks of alcohol     Types: 4 Cans of beer per week     Comment: rarely    Drug use: Yes     Frequency: 1.0 times per week     Types: Marijuana     Comment: Hx: Heroin (last use 2012) smokes weed ocassionally          Objective      Vitals:    24 1023   BP: 118/83   Pulse: 100       LAST EKG  Results for orders placed or performed during the hospital encounter of 23   EKG 12-lead    Collection Time: 23 11:21 PM    Narrative    Test Reason : R07.9,    Vent. Rate : 084 BPM     Atrial Rate : 084 BPM     P-R Int : 122 ms          QRS Dur : 086 ms      QT Int : 346 ms       P-R-T Axes : 045 066 024 degrees     QTc Int : 408 ms    Normal sinus rhythm  Normal ECG  When compared with ECG of 17-AUG-2023 14:08,  No significant change was found  Confirmed by Jori Osuna MD ([id number]) on 2023 11:34:44 AM    Referred By: [email or web address]   SELF           Confirmed By:Jori Osuna MD     LIPIDS - LAST 2   Lab Results   Component Value Date    CHOL 164 2024    CHOL 195 2024    HDL 42 2024    HDL 41 2024    LDLCALC 101.0 2024    LDLCALC 112.2 2024    TRIG 105 2024    TRIG 209 (H) 2024    CHOLHDL 25.6 2024    CHOLHDL 21.0 2024     CARDIAC PROFILE - LAST 2  Lab Results    Component Value Date    BNP 12 08/18/2023    BNP 13 08/17/2023    CPK 75 08/18/2021     08/18/2021    CPKMB 2.6 08/07/2020     08/18/2021    TROPONINI <0.030 08/19/2021    TROPONINI <0.030 11/08/2020      CBC - LAST 2  Lab Results   Component Value Date    WBC 10.41 05/24/2024    WBC 11.09 08/18/2023    RBC 5.10 05/24/2024    RBC 4.76 08/18/2023    HGB 16.6 05/24/2024    HGB 15.8 08/18/2023    HCT 46.9 05/24/2024    HCT 44.0 08/18/2023     05/24/2024     08/18/2023     Lab Results   Component Value Date    LABPT 12.6 11/07/2020    INR 1.0 08/18/2023    INR 1.0 01/12/2021    APTT 28.0 01/12/2021     CHEMISTRY - LAST 2  Lab Results   Component Value Date     08/20/2024     (L) 05/24/2024    K 4.2 08/20/2024    K 3.8 05/24/2024     08/20/2024     05/24/2024    CO2 31 (H) 08/20/2024    CO2 22 (L) 05/24/2024    ANIONGAP 6 (L) 08/20/2024    ANIONGAP 12 05/24/2024    BUN 28 (H) 08/20/2024    BUN 21 (H) 05/24/2024    CREATININE 1.4 08/20/2024    CREATININE 1.2 05/24/2024     08/20/2024     05/24/2024    CALCIUM 9.7 08/20/2024    CALCIUM 9.6 05/24/2024    MG 2.2 08/17/2023    MG 1.9 08/19/2021    ALBUMIN 4.5 08/20/2024    ALBUMIN 5.0 05/24/2024    PROT 7.1 08/20/2024    PROT 7.9 05/24/2024    ALKPHOS 49 (L) 08/20/2024    ALKPHOS 72 05/24/2024    ALT 30 08/20/2024    ALT 99 (H) 05/24/2024    AST 25 08/20/2024     (H) 05/24/2024    BILITOT 0.7 08/20/2024    BILITOT 1.1 (H) 05/24/2024      ENDOCRINE - LAST 2  Lab Results   Component Value Date    HGBA1C 5.2 05/24/2024    HGBA1C 5.2 08/17/2023    TSH 1.790 11/07/2020    TSH 1.610 08/08/2020        PHYSICAL EXAM  CONSTITUTIONAL: Well built, well nourished in no apparent distress  NECK: no carotid bruit, no JVD  LUNGS: CTA  CHEST WALL: no tenderness  HEART: regular rate and rhythm, S1, S2 normal, no murmur  ABDOMEN: soft, non-tender; bowel sounds normal; no masses  EXTREMITIES: Extremities normal, no edema,  no calf tenderness noted  NEURO: AAO X 3, speech clear, memory clear    I HAVE REVIEWED :    The vital signs, most recent cardiac testing, and most recent pertinent non-cardiology provider notes.    Current Outpatient Medications   Medication Instructions    ALPRAZolam (XANAX) 0.5 mg, Oral, Nightly PRN    apixaban (ELIQUIS) 5 mg, Oral, 2 times daily    aspirin (ECOTRIN) 81 mg, Oral, Daily    fenofibrate 160 mg, Oral, Daily    hydroCHLOROthiazide (HYDRODIURIL) 12.5 mg, Oral, Daily    magnesium oxide (MAG-OX) 400 mg, Oral, Daily    omeprazole (PRILOSEC) 20 mg, Oral, Daily    valsartan (DIOVAN) 80 mg, Oral, Daily      Assessment & Plan     Current use of long term anticoagulation  No bleeding tendencies    Anxiety  Taking xanax as needed, rarely needs to take;  Anxiety level still issue, no change  Seeing PCP for this    Insomnia  Discussed homeopathic remedies  Stay physically active during day  Avoid excess caffeine and ETOH    RAMIRO (obstructive sleep apnea)  Couldn't tolerate CPAP  Suggested follow up with pulmonary for other treatments    Paroxysmal atrial fibrillation  Continue Eliquis 5 mg BID; stopped metoprolol- caused fatigue  Continue magnesium oxide 400 mg daily  Has occasional random periods dizziness, will feel palpitation up to the neck area, but no heart racing; was occurring more frequently when was on full dose valsartan; dropped dose to half tab and noticed big improvement in symptoms    Dehydration  Adequate hydration and most recent labs    Hyperlipidemia  Continue routine exercise and fenofibrate 160 mg daily    Hypertension  Blood pressure is well controlled, 118/83 mm Hg and is well controlled on his home log as well.  He has been taking valsartan 160 mg daily.    Reduced valsartan to 80 mg daily as he is having dizziness that he associates with this medication.  He states when he holds the medication he does not have the symptoms of dizziness.    He is also taking 12.5 of HCTZ daily    Again  discussed efforts to remain hydrated    GERD (gastroesophageal reflux disease)  Patient takes omeprazole as needed    Encounter to discuss test results  Discuss recent lab work  Advised to increase hydration to daily routine as he appeared dehydrated on lab work.  Liver enzymes have returned to normal.  Advised to avoid excess alcohol or Tylenol.  Additionally cholesterol panel is controlled        Continue follow up PCP  Follow up with Cardiology in 1 year or sooner as needed             MARTY Levin

## 2024-08-26 NOTE — ASSESSMENT & PLAN NOTE
Discuss recent lab work  Advised to increase hydration to daily routine as he appeared dehydrated on lab work.  Liver enzymes have returned to normal.  Advised to avoid excess alcohol or Tylenol.  Additionally cholesterol panel is controlled

## 2024-08-26 NOTE — ASSESSMENT & PLAN NOTE
Blood pressure is well controlled, 118/83 mm Hg and is well controlled on his home log as well.  He has been taking valsartan 160 mg daily.    Reduced valsartan to 80 mg daily as he is having dizziness that he associates with this medication.  He states when he holds the medication he does not have the symptoms of dizziness.    He is also taking 12.5 of HCTZ daily    Again discussed efforts to remain hydrated

## 2024-08-28 LAB
OHS QRS DURATION: 90 MS
OHS QTC CALCULATION: 418 MS

## 2024-09-20 ENCOUNTER — TELEPHONE (OUTPATIENT)
Dept: CARDIOLOGY | Facility: CLINIC | Age: 39
End: 2024-09-20
Payer: MEDICAID

## 2024-09-20 NOTE — TELEPHONE ENCOUNTER
----- Message from Linda Downey sent at 9/20/2024  2:16 PM CDT -----  Regarding: advise  Contact: patient  Type: Needs Medical Advice  Who Called:  patient   Symptoms (please be specific):    How long has patient had these symptoms:    Pharmacy name and phone #:   Best Call Back Number: [phone]    Additional Information: hey pt is seeking to speak with ur office regarding his blood pressure medication

## 2024-10-07 DIAGNOSIS — F41.9 ANXIETY: ICD-10-CM

## 2024-10-16 DIAGNOSIS — I10 PRIMARY HYPERTENSION: Primary | ICD-10-CM

## 2024-10-16 RX ORDER — ALPRAZOLAM 0.5 MG/1
0.5 TABLET ORAL NIGHTLY PRN
Qty: 30 TABLET | Refills: 0 | Status: SHIPPED | OUTPATIENT
Start: 2024-10-16

## 2024-10-16 RX ORDER — VALSARTAN 80 MG/1
80 TABLET ORAL DAILY
Qty: 360 TABLET | Refills: 0 | Status: SHIPPED | OUTPATIENT
Start: 2024-10-16 | End: 2025-10-16

## 2024-10-16 NOTE — TELEPHONE ENCOUNTER
10/16/24 Refill request will be sent to the provider for provider ;last seen in the office on 8/26/24

## 2024-10-16 NOTE — TELEPHONE ENCOUNTER
----- Message from Timothy sent at 10/16/2024  3:36 PM CDT -----  Contact: self  Type:  RX Refill Request    Who Called:  Patient  Refill or New Rx:  Refill  RX Name and Strength:  valsartan (DIOVAN) 80 MG tablet  How is the patient currently taking it? (ex. 1XDay):  as directed  Is this a 30 day or 90 day RX:  as directed    Preferred Pharmacy with phone number:    [facility] [address] [place] [address]  Phone: [phone] Fax: [phone]      Local or Mail Order:  Local  Ordering Provider:  Phu Turner Call Back Number:  [phone]    Additional Information:  Pt need a refill.Please call and advise

## 2024-11-12 ENCOUNTER — LAB VISIT (OUTPATIENT)
Dept: LAB | Facility: HOSPITAL | Age: 39
End: 2024-11-12
Attending: NURSE PRACTITIONER
Payer: MEDICAID

## 2024-11-12 ENCOUNTER — OFFICE VISIT (OUTPATIENT)
Dept: FAMILY MEDICINE | Facility: CLINIC | Age: 39
End: 2024-11-12
Payer: MEDICAID

## 2024-11-12 VITALS
SYSTOLIC BLOOD PRESSURE: 132 MMHG | HEIGHT: 72 IN | TEMPERATURE: 98 F | WEIGHT: 285 LBS | OXYGEN SATURATION: 99 % | HEART RATE: 92 BPM | DIASTOLIC BLOOD PRESSURE: 78 MMHG | BODY MASS INDEX: 38.6 KG/M2

## 2024-11-12 DIAGNOSIS — R42 DIZZINESS AND GIDDINESS: Primary | ICD-10-CM

## 2024-11-12 DIAGNOSIS — E66.01 CLASS 2 SEVERE OBESITY DUE TO EXCESS CALORIES WITH SERIOUS COMORBIDITY AND BODY MASS INDEX (BMI) OF 38.0 TO 38.9 IN ADULT: ICD-10-CM

## 2024-11-12 DIAGNOSIS — R53.83 FATIGUE: ICD-10-CM

## 2024-11-12 DIAGNOSIS — F41.0 PANIC ATTACKS: Primary | ICD-10-CM

## 2024-11-12 DIAGNOSIS — R53.83 FATIGUE, UNSPECIFIED TYPE: ICD-10-CM

## 2024-11-12 DIAGNOSIS — R55 SYNCOPE AND COLLAPSE: ICD-10-CM

## 2024-11-12 DIAGNOSIS — R42 DIZZY SPELLS: ICD-10-CM

## 2024-11-12 DIAGNOSIS — I10 PRIMARY HYPERTENSION: ICD-10-CM

## 2024-11-12 DIAGNOSIS — R55 NEAR SYNCOPE: ICD-10-CM

## 2024-11-12 DIAGNOSIS — E66.812 CLASS 2 SEVERE OBESITY DUE TO EXCESS CALORIES WITH SERIOUS COMORBIDITY AND BODY MASS INDEX (BMI) OF 38.0 TO 38.9 IN ADULT: ICD-10-CM

## 2024-11-12 DIAGNOSIS — F41.9 ANXIETY: ICD-10-CM

## 2024-11-12 LAB
CORTIS SERPL-MCNC: 8.3 UG/DL (ref 4.3–22.4)
TSH SERPL DL<=0.005 MIU/L-ACNC: 1.93 UIU/ML (ref 0.34–5.6)

## 2024-11-12 PROCEDURE — 1159F MED LIST DOCD IN RCRD: CPT | Mod: CPTII,,, | Performed by: NURSE PRACTITIONER

## 2024-11-12 PROCEDURE — 3078F DIAST BP <80 MM HG: CPT | Mod: CPTII,,, | Performed by: NURSE PRACTITIONER

## 2024-11-12 PROCEDURE — 3044F HG A1C LEVEL LT 7.0%: CPT | Mod: CPTII,,, | Performed by: NURSE PRACTITIONER

## 2024-11-12 PROCEDURE — 1160F RVW MEDS BY RX/DR IN RCRD: CPT | Mod: CPTII,,, | Performed by: NURSE PRACTITIONER

## 2024-11-12 PROCEDURE — 84443 ASSAY THYROID STIM HORMONE: CPT | Performed by: NURSE PRACTITIONER

## 2024-11-12 PROCEDURE — 3008F BODY MASS INDEX DOCD: CPT | Mod: CPTII,,, | Performed by: NURSE PRACTITIONER

## 2024-11-12 PROCEDURE — 99999 PR PBB SHADOW E&M-EST. PATIENT-LVL III: CPT | Mod: PBBFAC,,, | Performed by: NURSE PRACTITIONER

## 2024-11-12 PROCEDURE — 3061F NEG MICROALBUMINURIA REV: CPT | Mod: CPTII,,, | Performed by: NURSE PRACTITIONER

## 2024-11-12 PROCEDURE — 82533 TOTAL CORTISOL: CPT | Performed by: NURSE PRACTITIONER

## 2024-11-12 PROCEDURE — 3075F SYST BP GE 130 - 139MM HG: CPT | Mod: CPTII,,, | Performed by: NURSE PRACTITIONER

## 2024-11-12 PROCEDURE — 4010F ACE/ARB THERAPY RXD/TAKEN: CPT | Mod: CPTII,,, | Performed by: NURSE PRACTITIONER

## 2024-11-12 PROCEDURE — 99213 OFFICE O/P EST LOW 20 MIN: CPT | Mod: S$PBB,,, | Performed by: NURSE PRACTITIONER

## 2024-11-12 PROCEDURE — 99213 OFFICE O/P EST LOW 20 MIN: CPT | Mod: PBBFAC,PN | Performed by: NURSE PRACTITIONER

## 2024-11-12 PROCEDURE — 84402 ASSAY OF FREE TESTOSTERONE: CPT | Performed by: NURSE PRACTITIONER

## 2024-11-12 PROCEDURE — 3066F NEPHROPATHY DOC TX: CPT | Mod: CPTII,,, | Performed by: NURSE PRACTITIONER

## 2024-11-12 PROCEDURE — 84403 ASSAY OF TOTAL TESTOSTERONE: CPT | Performed by: NURSE PRACTITIONER

## 2024-11-12 RX ORDER — PROPRANOLOL HYDROCHLORIDE 10 MG/1
10 TABLET ORAL 3 TIMES DAILY PRN
Qty: 90 TABLET | Refills: 3 | Status: SHIPPED | OUTPATIENT
Start: 2024-11-12 | End: 2025-11-12

## 2024-11-12 RX ORDER — ALPRAZOLAM 0.5 MG/1
0.5 TABLET ORAL NIGHTLY PRN
Qty: 30 TABLET | Refills: 2 | Status: SHIPPED | OUTPATIENT
Start: 2024-11-12

## 2024-11-12 NOTE — PROGRESS NOTES
Subjective:       Patient ID: Nabil Urias is a 39 y.o. male.    Chief Complaint: Follow-up    History of Present Illness    CHIEF COMPLAINT:  Patient presents for a follow-up visit to discuss recent labs results and medication management.    HPI:  Patient reports occasional dizzy spells, which have decreased in frequency since his blood pressure medication was reduced by his cardiologist from 20 to 80 mg. These spells now occur about once weekly, lasting approximately 30 seconds, and are often preceded by visual disturbances. The episodes typically occur when the patient is stationary, rather than when mobile. Patient denies feeling like he was fainting during these spells.    Patient describes anxiety attacks, which have been increasing in frequency. These attacks are characterized by chest tightness and feelings of restlessness, and tend to occur more often when he is at home with his children, triggered by stressful interactions.    Sleep has been problematic, especially when sleeping out of town for work. Patient has been using Xanax to help with sleep and to manage anxiety attacks, reporting that it alleviates his symptoms.    Patient has regained all previously lost weight, attributing this to his current work schedule as a traveling . This involves irregular eating patterns and limited opportunities for physical activity. He works in extremely hot environments, such as the bottom of [employer] or other equipment, where temperatures can reach 150 Fahrenheit.    Home blood pressure readings have been lower than the reading taken today (132/78). He did not take his blood pressure medication this morning.    MEDICATIONS:  Patient is on Hydrochlorothiazide, taken in the morning. He is also on Xanax, as needed for sleep and anxiety, taking 4 per month. His blood pressure medication has been reduced from 20 to 80 mg by the cardiologist.    MEDICAL HISTORY:  Patient has a history of cardiac issues and  anxiety.    TEST RESULTS:  Patient's cortisol level at 8:00 AM was normal, ruling out adrenal disease. His thyroid function test showed a TSH of 1.9, which is normal, indicating an active thyroid. Free testosterone and total testosterone results are pending and expected in about 7 days.    SOCIAL HISTORY:  Patient works as a traveling . He was previously employed as a .      ROS:  General: -fever, -chills, -fatigue, -weight gain, -weight loss  Eyes: -vision changes, -redness, -discharge  ENT: -ear pain, -nasal congestion, -sore throat  Cardiovascular: -chest pain, -palpitations, -lower extremity edema  Respiratory: -cough, -shortness of breath  Gastrointestinal: -abdominal pain, -nausea, -vomiting, -diarrhea, -constipation, -blood in stool  Genitourinary: -dysuria, -hematuria, -frequency  Musculoskeletal: -joint pain, -muscle pain  Skin: -rash, -lesion  Neurological: -headache, +dizziness, -numbness, -tingling  Psychiatric: +anxiety, -depression, +sleep difficulty         Past Medical History:   Diagnosis Date    Atrial fibrillation     Essential (primary) hypertension     History of cardiac radiofrequency ablation     Hyperlipidemia         Past Surgical History:   Procedure Laterality Date    ABLATION OF ARRHYTHMOGENIC FOCUS FOR ATRIAL FIBRILLATION N/A 1/21/2021    Procedure: ABLATION, ARRHYTHMOGENIC FOCUS, FOR ATRIAL FIBRILLATION;  Surgeon: Daniel Handy MD;  Location: [facility] EP LAB;  Service: Cardiology;  Laterality: N/A;  AF, PVI, GABRIELA (Cx if SR), PVI, RFA, CARTO, GEN, GP, 3 PREP    CARDIOVERSION      TONSILLECTOMY  1990       Family History   Problem Relation Name Age of Onset    Heart attack Maternal Grandfather         Social History     Socioeconomic History    Marital status:    Tobacco Use    Smoking status: Former     Current packs/day: 0.00     Average packs/day: 1 pack/day for 5.0 years (5.0 ttl pk-yrs)     Types: Cigarettes     Start date: 2012     Quit date: 9/24/2015      Years since quittin.1    Smokeless tobacco: Current     Types: Chew    Tobacco comments:     1/2 can chew qd   Substance and Sexual Activity    Alcohol use: Yes     Alcohol/week: 4.0 standard drinks of alcohol     Types: 4 Cans of beer per week     Comment: rarely    Drug use: Yes     Frequency: 1.0 times per week     Types: Marijuana     Comment: Hx: Heroin (last use 2012) smokes weed ocassionally     Sexual activity: Yes     Partners: Female   Social History Narrative    ** Merged History Encounter **          Social Drivers of Health     Financial Resource Strain: Low Risk  (2024)    Overall Financial Resource Strain (CARDIA)     Difficulty of Paying Living Expenses: Not hard at all   Food Insecurity: No Food Insecurity (2024)    Hunger Vital Sign     Worried About Running Out of Food in the Last Year: Never true     Ran Out of Food in the Last Year: Never true   Transportation Needs: No Transportation Needs (2023)    PRAPARE - Transportation     Lack of Transportation (Medical): No     Lack of Transportation (Non-Medical): No   Physical Activity: Unknown (2024)    Exercise Vital Sign     Days of Exercise per Week: 6 days   Stress: Stress Concern Present (2024)    [employer] - Occupational Stress Questionnaire     Feeling of Stress : Very much   Housing Stability: Unknown (2024)    Housing Stability Vital Sign     Unable to Pay for Housing in the Last Year: Patient declined       Current Outpatient Medications   Medication Sig Dispense Refill    apixaban (ELIQUIS) 5 mg Tab Take 1 tablet (5 mg total) by mouth 2 (two) times daily. 180 tablet 3    aspirin (ECOTRIN) 81 MG EC tablet Take 1 tablet (81 mg total) by mouth once daily. 90 tablet 3    hydroCHLOROthiazide (HYDRODIURIL) 12.5 MG Tab Take 1 tablet (12.5 mg total) by mouth once daily. 90 tablet 3    magnesium oxide (MAG-OX) 400 mg (241.3 mg magnesium) tablet Take 1 tablet (400 mg total) by  mouth once daily. 90 tablet 3    omeprazole (PRILOSEC) 20 MG capsule Take 20 mg by mouth once daily.      valsartan (DIOVAN) 80 MG tablet Take 1 tablet (80 mg total) by mouth once daily. 360 tablet 0    ALPRAZolam (XANAX) 0.5 MG tablet Take 1 tablet (0.5 mg total) by mouth nightly as needed for Anxiety. 30 tablet 2    fenofibrate 160 MG Tab Take 1 tablet (160 mg total) by mouth once daily. 30 tablet 2    propranoloL (INDERAL) 10 MG tablet Take 1 tablet (10 mg total) by mouth 3 (three) times daily as needed (panic attack). 90 tablet 3     No current facility-administered medications for this visit.       Review of patient's allergies indicates:  No Known Allergies  Objective:      Blood pressure 132/78, pulse 92, temperature 98.1 °F (36.7 °C), temperature source Oral, height 6' (1.829 m), weight 129.3 kg (285 lb), SpO2 99%. Body mass index is 38.65 kg/m².   Physical Exam    Vitals: Blood pressure: 132/78.  General: No acute distress. Well-developed. Well-nourished.  Eyes: EOMI. Sclerae anicteric.  HENT: Normocephalic. Atraumatic. Nares patent. Moist oral mucosa.  Ears: Bilateral TMs clear. Bilateral EACs clear.  Cardiovascular: Regular rate. Regular rhythm. No murmurs. No rubs. No gallops. Normal S1, S2.  Respiratory: Normal respiratory effort. Clear to auscultation bilaterally. No rales. No rhonchi. No wheezing.  Abdomen: Soft. Non-tender. Non-distended. Normoactive bowel sounds.  Musculoskeletal: No  obvious deformity.  Extremities: No lower extremity edema.  Neurological: Alert & oriented x3. No slurred speech. Normal gait.  Psychiatric: Normal mood. Normal affect. Good insight. Good judgment.  Skin: Warm. Dry. No rash.         Assessment:       Assessment & Plan    - Reviewed recent bloodwork results: cortisol and thyroid (TSH) normal, ruling out adrenal and thyroid issues  - Awaiting testosterone results  - Noted improvement in dizzy spells after cardiologist reduced blood pressure medication  - Considered  propranolol as an alternative to Xanax for daytime anxiety management  - Assessed need for testosterone replacement therapy pending lab results    HYPERTENSION:  Explained effects of hot environments on blood pressure, emphasizing importance of hydration and salt intake.  Patient to monitor symptoms and maintain adequate hydration, especially in hot work environments.  Recommend holding diuretic on days with hot work conditions.  Continued hydrochlorothiazide.  Take in the morning.    ANXIETY:  Discussed potential causes of recent increase in anxiety symptoms.  Provided information on propranolol's mechanism of action for anxiety symptom management.  Started propranolol 10 mg as needed for anxiety, up to 3 times daily.    INSOMNIA:  Continued Xanax for sleep and provided refill.    LOW TESTOSTERONE (SUSPECTED):  May refer to urology for testosterone replacement therapy if levels come back low.  Contact the office when testosterone results are available for review.    FOLLOW UP:  Follow up in 3-4 months (end of February or early March).       Plan:       Nabil was seen today for follow-up.    Diagnoses and all orders for this visit:    Panic attacks  -     propranoloL (INDERAL) 10 MG tablet; Take 1 tablet (10 mg total) by mouth 3 (three) times daily as needed (panic attack).    Anxiety  -     ALPRAZolam (XANAX) 0.5 MG tablet; Take 1 tablet (0.5 mg total) by mouth nightly as needed for Anxiety.    Primary hypertension  Keep follow up with cardiology    Fatigue, unspecified type  Stable  Improved with blood pressure medication reduction    Class 2 severe obesity due to excess calories with serious comorbidity and body mass index (BMI) of 38.0 to 38.9 in adult  The patient is asked to make an attempt to improve diet and exercise patterns to aid in medical management of this problem.           This note was generated with the assistance of ambient listening technology. Verbal consent was obtained by the patient and  accompanying visitor(s) for the recording of patient appointment to facilitate this note. I attest to having reviewed and edited the generated note for accuracy, though some syntax or spelling errors may persist. Please contact the author of this note for any clarification.

## 2024-11-13 LAB — TESTOST SERPL-MCNC: 324 NG/DL (ref 264–916)

## 2024-11-16 LAB — TESTOST FREE SERPL-MCNC: 11.9 PG/ML (ref 8.7–25.1)

## 2024-11-20 ENCOUNTER — TELEPHONE (OUTPATIENT)
Dept: CARDIOLOGY | Facility: CLINIC | Age: 39
End: 2024-11-20
Payer: MEDICAID

## 2024-11-20 ENCOUNTER — PATIENT MESSAGE (OUTPATIENT)
Dept: CARDIOLOGY | Facility: CLINIC | Age: 39
End: 2024-11-20
Payer: MEDICAID

## 2024-11-20 NOTE — TELEPHONE ENCOUNTER
----- Message from Cristian sent at 11/20/2024 11:31 AM CST -----  Regarding: return call  Contact: Wife: Kendall  Type:  Patient Returning Call    Who Called:Wife kendall  Who Left Message for Patient:staff  Does the patient know what this is regarding?:forms needs to be filled out   Would the patient rather a call back or a response via [facility]? Questions/ please call  Best Call Back Number:[phone]  Additional Information: [phone]

## 2025-02-14 DIAGNOSIS — F41.9 ANXIETY: ICD-10-CM

## 2025-02-14 RX ORDER — ALPRAZOLAM 0.5 MG/1
0.5 TABLET ORAL NIGHTLY PRN
Qty: 30 TABLET | Refills: 0 | Status: SHIPPED | OUTPATIENT
Start: 2025-02-14

## 2025-03-13 DIAGNOSIS — F41.9 ANXIETY: ICD-10-CM

## 2025-03-13 RX ORDER — ALPRAZOLAM 0.5 MG/1
0.5 TABLET ORAL NIGHTLY PRN
Qty: 30 TABLET | Refills: 0 | OUTPATIENT
Start: 2025-03-13

## 2025-03-14 ENCOUNTER — TELEPHONE (OUTPATIENT)
Dept: FAMILY MEDICINE | Facility: CLINIC | Age: 40
End: 2025-03-14
Payer: MEDICAID

## 2025-03-14 DIAGNOSIS — F41.0 PANIC ATTACKS: ICD-10-CM

## 2025-03-14 DIAGNOSIS — F41.9 ANXIETY: ICD-10-CM

## 2025-03-14 RX ORDER — ALPRAZOLAM 0.5 MG/1
0.5 TABLET ORAL NIGHTLY PRN
Qty: 10 TABLET | Refills: 0 | Status: SHIPPED | OUTPATIENT
Start: 2025-03-14

## 2025-03-14 RX ORDER — PROPRANOLOL HYDROCHLORIDE 10 MG/1
10 TABLET ORAL 3 TIMES DAILY PRN
Qty: 90 TABLET | Refills: 3 | Status: SHIPPED | OUTPATIENT
Start: 2025-03-14 | End: 2026-03-14

## 2025-03-14 NOTE — TELEPHONE ENCOUNTER
----- Message from Petrona sent at 3/13/2025  2:38 PM CDT -----  Type: RX Refill RequestWho called: PatientRefill or New Rx: RefillRx name and Strength: propranoloL (INDERAL) 10 MG tablet ; ALPRAZolam (XANAX) 0.5 MG tablet Next dr appt is scheduled for 3/19 ; needs medication before that ; patient was out of town for previous appt; please advise Would the patient rather a call back or a response via [facility]? [facility] call back number: [phone] information:[facility] [address] [place] [address] [address]Nfnre: [phone] Fax: [phone]

## 2025-03-14 NOTE — TELEPHONE ENCOUNTER
Patient no-showed his appointment for a controlled medication.   10 day supply of xanax sent with propranolol refills.     If he is unable to come to his next scheduled appointment, the prescription will be denied.

## 2025-03-19 ENCOUNTER — OFFICE VISIT (OUTPATIENT)
Dept: FAMILY MEDICINE | Facility: CLINIC | Age: 40
End: 2025-03-19
Payer: MEDICAID

## 2025-03-19 VITALS
SYSTOLIC BLOOD PRESSURE: 136 MMHG | HEART RATE: 97 BPM | TEMPERATURE: 98 F | BODY MASS INDEX: 37.62 KG/M2 | DIASTOLIC BLOOD PRESSURE: 72 MMHG | HEIGHT: 72 IN | WEIGHT: 277.75 LBS

## 2025-03-19 DIAGNOSIS — I10 PRIMARY HYPERTENSION: ICD-10-CM

## 2025-03-19 DIAGNOSIS — G47.33 OSA (OBSTRUCTIVE SLEEP APNEA): ICD-10-CM

## 2025-03-19 DIAGNOSIS — E78.1 HYPERTRIGLYCERIDEMIA: ICD-10-CM

## 2025-03-19 DIAGNOSIS — E66.812 CLASS 2 SEVERE OBESITY DUE TO EXCESS CALORIES WITH SERIOUS COMORBIDITY AND BODY MASS INDEX (BMI) OF 37.0 TO 37.9 IN ADULT: Primary | ICD-10-CM

## 2025-03-19 DIAGNOSIS — F41.9 ANXIETY: ICD-10-CM

## 2025-03-19 DIAGNOSIS — E66.01 CLASS 2 SEVERE OBESITY DUE TO EXCESS CALORIES WITH SERIOUS COMORBIDITY AND BODY MASS INDEX (BMI) OF 37.0 TO 37.9 IN ADULT: Primary | ICD-10-CM

## 2025-03-19 PROCEDURE — 99999 PR PBB SHADOW E&M-EST. PATIENT-LVL III: CPT | Mod: PBBFAC,,, | Performed by: NURSE PRACTITIONER

## 2025-03-19 PROCEDURE — 99213 OFFICE O/P EST LOW 20 MIN: CPT | Mod: PBBFAC,PN | Performed by: NURSE PRACTITIONER

## 2025-03-19 RX ORDER — TIRZEPATIDE 2.5 MG/.5ML
2.5 INJECTION, SOLUTION SUBCUTANEOUS
Qty: 4 PEN | Refills: 0 | Status: SHIPPED | OUTPATIENT
Start: 2025-03-19

## 2025-03-19 RX ORDER — ALPRAZOLAM 0.5 MG/1
0.5 TABLET ORAL NIGHTLY PRN
Qty: 30 TABLET | Refills: 2 | Status: SHIPPED | OUTPATIENT
Start: 2025-03-19

## 2025-03-19 NOTE — PROGRESS NOTES
"Subjective:       Patient ID: Nabil Urias is a 39 y.o. male.    Chief Complaint: Hypertension and Anxiety    History of Present Illness    CHIEF COMPLAINT:  Patient presents for medication refills and to discuss potentially starting Ozempic for weight loss.    HPI:  Patient reports difficulty losing weight despite efforts to improve diet, exercise regularly, and abstain from alcohol. He is frustrated with his inability to lose weight. Patient has ongoing sleep issues, with frequent nocturnal awakenings at 2:30-3:00 AM despite taking Xanax at night. He has a history of sleep apnea and was previously recommended to use a CPAP machine, but was unable to tolerate it due to nocturnal movement causing mask displacement. Patient tried various mask types, including nasal and full-face masks, but could not adapt to any of them. Patient has anxiety during medical visits, which he believes contributes to elevated blood pressure readings in the office setting. He feels nervous and has consistently high blood pressure measurements during office visits.    MEDICATIONS:  Patient is on Xanax at night for sleep, Hydrochlorothiazide 12.5 mg daily and Valsartan daily for blood pressure. He is also on Magnesium and Omeprazole daily. Propranolol is taken most mornings.    MEDICAL HISTORY:  Patient has a history of sleep apnea and hypertension.    FAMILY HISTORY:  Family history is significant for father with no mentioned history of prostate cancer.    TEST RESULTS:  Patient's cortisol levels were checked in November, and the results were fine. Lipid tests were conducted in August, with results described as "good". A sleep study was conducted, which indicated a need for CPAP. However, the patient was unable to tolerate various mask types.      ROS:  General: -fever, -chills, -fatigue, -weight gain, +weight loss  Eyes: -vision changes, -redness, -discharge  ENT: -ear pain, -nasal congestion, -sore throat  Cardiovascular: -chest " pain, -palpitations, -lower extremity edema  Respiratory: -cough, -shortness of breath, +intermittent breathing while sleeping, +apnea  Gastrointestinal: -abdominal pain, -nausea, -vomiting, -diarrhea, -constipation, -blood in stool  Genitourinary: -dysuria, -hematuria, -frequency  Musculoskeletal: -joint pain, -muscle pain  Skin: -rash, -lesion  Neurological: -headache, -dizziness, -numbness, -tingling, +sleep disturbances  Psychiatric: +anxiety, -depression, +sleep difficulty, +nervousness         Past Medical History:   Diagnosis Date    Atrial fibrillation     Essential (primary) hypertension     History of cardiac radiofrequency ablation     Hyperlipidemia         Past Surgical History:   Procedure Laterality Date    ABLATION OF ARRHYTHMOGENIC FOCUS FOR ATRIAL FIBRILLATION N/A 2021    Procedure: ABLATION, ARRHYTHMOGENIC FOCUS, FOR ATRIAL FIBRILLATION;  Surgeon: Daniel Handy MD;  Location: [facility] EP LAB;  Service: Cardiology;  Laterality: N/A;  AF, PVI, GABRIELA (Cx if SR), PVI, RFA, CARTO, GEN, GP, 3 PREP    CARDIOVERSION      TONSILLECTOMY         Family History   Problem Relation Name Age of Onset    Heart attack Maternal Grandfather         Social History     Socioeconomic History    Marital status:    Tobacco Use    Smoking status: Former     Current packs/day: 0.00     Average packs/day: 1 pack/day for 5.0 years (5.0 ttl pk-yrs)     Types: Cigarettes     Start date:      Quit date: 2015     Years since quittin.4    Smokeless tobacco: Current     Types: Chew    Tobacco comments:     1/2 can chew qd   Substance and Sexual Activity    Alcohol use: Yes     Alcohol/week: 4.0 standard drinks of alcohol     Types: 4 Cans of beer per week     Comment: rarely    Drug use: Yes     Frequency: 1.0 times per week     Types: Marijuana     Comment: Hx: Heroin (last use 2012) smokes weed ocassionally     Sexual activity: Yes     Partners: Female   Social History Narrative    ** Merged  History Encounter **          Social Drivers of Health     Financial Resource Strain: Low Risk  (3/18/2025)    Overall Financial Resource Strain (CARDIA)     Difficulty of Paying Living Expenses: Not hard at all   Food Insecurity: No Food Insecurity (3/18/2025)    Hunger Vital Sign     Worried About Running Out of Food in the Last Year: Never true     Ran Out of Food in the Last Year: Never true   Transportation Needs: No Transportation Needs (3/18/2025)    PRAPARE - Transportation     Lack of Transportation (Medical): No     Lack of Transportation (Non-Medical): No   Physical Activity: Sufficiently Active (3/18/2025)    Exercise Vital Sign     Days of Exercise per Week: 3 days     Minutes of Exercise per Session: 60 min   Stress: Stress Concern Present (3/18/2025)    [employer] - Occupational Stress Questionnaire     Feeling of Stress : Rather much   Housing Stability: Low Risk  (3/18/2025)    Housing Stability Vital Sign     Unable to Pay for Housing in the Last Year: No     Homeless in the Last Year: No       Current Medications[1]    Review of patient's allergies indicates:  No Known Allergies  Objective:      Blood pressure 136/72, pulse 97, temperature 97.7 °F (36.5 °C), height 6' (1.829 m), weight 126 kg (277 lb 12.5 oz), peak flow 97 L/min. Body mass index is 37.67 kg/m².   Physical Exam    Vitals: Blood pressure: 136/72.  General: No acute distress. Well-developed. Well-nourished. Obese.  Eyes: EOMI. Sclerae anicteric.  HENT: Normocephalic. Atraumatic. Nares patent. Moist oral mucosa.  Ears: Bilateral TMs clear. Bilateral EACs clear.  Cardiovascular: Regular rate. Regular rhythm. No murmurs. No rubs. No gallops. Normal S1, S2.  Respiratory: Normal respiratory effort. Clear to auscultation bilaterally. No rales. No rhonchi. No wheezing.  Abdomen: Soft. Non-tender. Non-distended. Normoactive bowel sounds.  Musculoskeletal: No  obvious deformity.  Extremities: No lower extremity  edema.  Neurological: Alert & oriented x3. No slurred speech. Normal gait.  Psychiatric: Normal mood. Normal affect. Good insight. Good judgment.  Skin: Warm. Dry. No rash.         Assessment:       Assessment & Plan    - Considered Zepbound (tirzepatide) for weight loss and sleep apnea management, given difficulty with CPAP use and BMI.  - Prescribed starter dose of Zepbound 2.5 mg weekly injection for 1 month to assess insurance coverage and tolerance.  - Evaluated current medication regimen, including blood pressure medications and Xanax for sleep.  - Elevated blood pressure in office likely due to white coat HTN.  - Discussed potential insurance coverage challenges for newly approved indications.    OBSTRUCTIVE SLEEP APNEA:  - Noted the patient's difficulty sleeping, waking up at 2:30-3:00 AM every morning.  - Acknowledged previous sleep study indicating the need for CPAP, which the patient could not use due to nocturnal movement.  - Explained that treating sleep apnea would improve energy, sleep quality, and aid in weight loss.  - Prescribed Zepbound (tirzepatide) for weight loss and sleep apnea, starting with 2.5mg weekly dose for 1 month.  - Noted the patient's use of Xanax for sleep, despite persistent sleep disturbances.    ESSENTIAL HYPERTENSION:  - Continued hydrochlorothiazide 12.5 mg daily for blood pressure management.  - Continued valsartan daily.  - Continued propranolol daily, advising the patient to take it every morning.  - Noted that the patient's blood pressure is often elevated during office visits due to anxiety, but is usually within normal range when checked at home.  - Measured blood pressure in office, initially high but rechecked at 136/72.    ANXIETY DISORDER:  - Refilled Xanax for sleep management.  - Observed anxiety during office visits, contributing to elevated blood pressure readings.  - Continued Xanax prescription for anxiety management.    FOLLOW-UP:  - Planned to continue  current medication regimen and reevaluate in 3 months.       Plan:       Nabil was seen today for hypertension and anxiety.    Diagnoses and all orders for this visit:    Class 2 severe obesity due to excess calories with serious comorbidity and body mass index (BMI) of 37.0 to 37.9 in adult  -     tirzepatide, weight loss, (ZEPBOUND) 2.5 mg/0.5 mL PnIj; Inject 2.5 mg into the skin every 7 days.    KRISTINA (obstructive sleep apnea)  -     tirzepatide, weight loss, (ZEPBOUND) 2.5 mg/0.5 mL PnIj; Inject 2.5 mg into the skin every 7 days.  -     Comprehensive Metabolic Panel; Future  -     TSH; Future    Anxiety  -     ALPRAZolam (XANAX) 0.5 MG tablet; Take 1 tablet (0.5 mg total) by mouth nightly as needed for Anxiety.    Primary hypertension  -     CBC Auto Differential; Future  -     Comprehensive Metabolic Panel; Future  -     Urinalysis; Future    Hypertriglyceridemia  -     Lipid Panel; Future           This note was generated with the assistance of ambient listening technology. Verbal consent was obtained by the patient and accompanying visitor(s) for the recording of patient appointment to facilitate this note. I attest to having reviewed and edited the generated note for accuracy, though some syntax or spelling errors may persist. Please contact the author of this note for any clarification.    Visit today included increased complexity associated with the care of the episodic problem obesity and insomnia addressed and managing the longitudinal care of the patient due to the serious and/or complex managed problem(s) anxiety, hypertension, hyperlipidemia, kristina.          [1]   Current Outpatient Medications   Medication Sig Dispense Refill    apixaban (ELIQUIS) 5 mg Tab Take 1 tablet (5 mg total) by mouth 2 (two) times daily. 180 tablet 3    aspirin (ECOTRIN) 81 MG EC tablet Take 1 tablet (81 mg total) by mouth once daily. 90 tablet 3    hydroCHLOROthiazide 12.5 MG Tab Take 1 tablet (12.5 mg total) by mouth once daily.  90 tablet 3    magnesium oxide (MAG-OX) 400 mg (241.3 mg magnesium) tablet Take 1 tablet (400 mg total) by mouth once daily. 90 tablet 3    omeprazole (PRILOSEC) 20 MG capsule Take 20 mg by mouth once daily.      propranoloL (INDERAL) 10 MG tablet Take 1 tablet (10 mg total) by mouth 3 (three) times daily as needed (panic attack). 90 tablet 3    valsartan (DIOVAN) 80 MG tablet Take 1 tablet (80 mg total) by mouth once daily. 360 tablet 0    ALPRAZolam (XANAX) 0.5 MG tablet Take 1 tablet (0.5 mg total) by mouth nightly as needed for Anxiety. 30 tablet 2    tirzepatide, weight loss, (ZEPBOUND) 2.5 mg/0.5 mL PnIj Inject 2.5 mg into the skin every 7 days. 4 Pen 0     No current facility-administered medications for this visit.

## 2025-04-06 ENCOUNTER — HOSPITAL ENCOUNTER (EMERGENCY)
Facility: HOSPITAL | Age: 40
Discharge: HOME | End: 2025-04-06
Attending: EMERGENCY MEDICINE
Payer: MEDICAID

## 2025-04-06 VITALS
RESPIRATION RATE: 18 BRPM | OXYGEN SATURATION: 99 % | TEMPERATURE: 98 F | WEIGHT: 266 LBS | DIASTOLIC BLOOD PRESSURE: 86 MMHG | SYSTOLIC BLOOD PRESSURE: 125 MMHG | BODY MASS INDEX: 36.03 KG/M2 | HEIGHT: 72 IN | HEART RATE: 86 BPM

## 2025-04-06 DIAGNOSIS — S91.311A FOOT LACERATION, RIGHT, INITIAL ENCOUNTER: Primary | ICD-10-CM

## 2025-04-06 DIAGNOSIS — R52 PAIN: ICD-10-CM

## 2025-04-06 PROCEDURE — 63600175 PHARM REV CODE 636 W HCPCS: Performed by: NURSE PRACTITIONER

## 2025-04-06 PROCEDURE — 12042 INTMD RPR N-HF/GENIT2.6-7.5: CPT

## 2025-04-06 PROCEDURE — 99283 EMERGENCY DEPT VISIT LOW MDM: CPT | Mod: 25

## 2025-04-06 PROCEDURE — 25000003 PHARM REV CODE 250: Performed by: NURSE PRACTITIONER

## 2025-04-06 RX ORDER — LIDOCAINE HYDROCHLORIDE 10 MG/ML
10 INJECTION, SOLUTION EPIDURAL; INFILTRATION; INTRACAUDAL; PERINEURAL
Status: COMPLETED | OUTPATIENT
Start: 2025-04-06 | End: 2025-04-06

## 2025-04-06 RX ORDER — DICLOFENAC SODIUM 50 MG/1
50 TABLET, DELAYED RELEASE ORAL 3 TIMES DAILY PRN
Qty: 20 TABLET | Refills: 2 | Status: SHIPPED | OUTPATIENT
Start: 2025-04-06

## 2025-04-06 RX ORDER — OXYCODONE AND ACETAMINOPHEN 5; 325 MG/1; MG/1
1 TABLET ORAL
Refills: 0 | Status: COMPLETED | OUTPATIENT
Start: 2025-04-06 | End: 2025-04-06

## 2025-04-06 RX ADMIN — OXYCODONE HYDROCHLORIDE AND ACETAMINOPHEN 1 TABLET: 5; 325 TABLET ORAL at 11:04

## 2025-04-06 RX ADMIN — LIDOCAINE HYDROCHLORIDE 100 MG: 10 INJECTION, SOLUTION EPIDURAL; INFILTRATION; INTRACAUDAL at 01:04

## 2025-04-06 NOTE — ED PROVIDER NOTES
Encounter Date: 4/6/2025       History     Chief Complaint   Patient presents with    Laceration     Right foot laceration.  Crockpot fell on foot.  +blood thinners.  Bleeding controlled in triage.      Presents with a laceration to the right foot near the base of the great toe.  Patient reports his pain as 6/10.  He reports he dropped a crock pot on his foot.  He is up-to-date on his tetanus from 2 years ago.  He is on blood thinners for AFib.  He has it well bandage and there is no active bleeding currently.  He is ambulatory per self.  He can not bear weight to this foot.      Review of patient's allergies indicates:  No Known Allergies  Past Medical History:   Diagnosis Date    Atrial fibrillation     Essential (primary) hypertension     History of cardiac radiofrequency ablation     Hyperlipidemia      Past Surgical History:   Procedure Laterality Date    ABLATION OF ARRHYTHMOGENIC FOCUS FOR ATRIAL FIBRILLATION N/A 1/21/2021    Procedure: ABLATION, ARRHYTHMOGENIC FOCUS, FOR ATRIAL FIBRILLATION;  Surgeon: Daniel Handy MD;  Location: [facility] EP LAB;  Service: Cardiology;  Laterality: N/A;  AF, PVI, GABRIELA (Cx if SR), PVI, RFA, CARTO, GEN, GP, 3 PREP    CARDIOVERSION      TONSILLECTOMY  1990     Family History   Problem Relation Name Age of Onset    Heart attack Maternal Grandfather       Social History[1]  Review of Systems   Constitutional:  Negative for fever.   Respiratory:  Negative for cough, shortness of breath and wheezing.    Cardiovascular:  Negative for chest pain, palpitations and leg swelling.   Gastrointestinal:  Negative for abdominal pain, diarrhea, nausea and vomiting.   Musculoskeletal:  Negative for back pain, gait problem, joint swelling and neck pain.   Skin:  Positive for wound. Negative for rash.   Neurological:  Negative for weakness.       Physical Exam     Initial Vitals [04/06/25 1128]   BP Pulse Resp Temp SpO2   128/87 89 18 97.8 °F (36.6 °C) 97 %      MAP       --         Physical  Exam    Constitutional: He appears well-developed and well-nourished. No distress.   HENT:   Head: Normocephalic and atraumatic. Mouth/Throat: Oropharynx is clear and moist.   Eyes: Conjunctivae are normal.   Neck: Neck supple.   Normal range of motion.  Cardiovascular:  Normal rate and regular rhythm.           Pulmonary/Chest: Breath sounds normal. No respiratory distress.   Musculoskeletal:         General: Tenderness present. Normal range of motion.      Cervical back: Normal range of motion and neck supple.      Comments: Tenderness to the base of the 5th metatarsal.  There is a laceration of approximately 4 cm.  The bleeding is controlled.  There is a foreign body present near the surface of the wound.  It was easily removed.  His toes are warm and mobile.  There is no evidence of a tendon involvement.  His cap refill is less than 2 seconds.  He can not place full weight to the foot.  He moves all other extremities without difficulty.     Neurological: He is alert and oriented to person, place, and time. No sensory deficit. GCS score is 15. GCS eye subscore is 4. GCS verbal subscore is 5. GCS motor subscore is 6.   Skin: Skin is warm. Capillary refill takes less than 2 seconds.   There is a 4 cm laceration that is linear to the base of the 5th right great toe.  Bleeding is controlled.  There is a visible foreign body that has easily removed.   Psychiatric: He has a normal mood and affect. Thought content normal.         ED Course   Lac Repair    Date/Time: 4/6/2025 4:26 PM    Performed by: Kelly Mixon NP  Authorized by: Carlitos King MD    Consent:     Consent obtained:  Verbal    Consent given by:  Patient  Universal protocol:     Patient identity confirmed:  Verbally with patient  Anesthesia:     Anesthesia method:  Local infiltration    Local anesthetic:  Lidocaine 1% w/o epi  Laceration details:     Location:  Foot    Foot location:  Top of R foot    Length (cm):  4  Pre-procedure details:      Preparation:  Patient was prepped and draped in usual sterile fashion  Exploration:     Limited defect created (wound extended): no      Wound extent: foreign bodies/material      Wound extent: no tendon damage noted      Foreign bodies/material:  There was a police of glass removed from the wound.  Treatment:     Area cleansed with:  Povidone-iodine and saline    Amount of cleaning:  Extensive    Irrigation volume:  Irrigated with 10 cc of saline and Betadine.    Irrigation method:  Syringe    Visualized foreign bodies/material removed: yes    Skin repair:     Repair method:  Sutures    Suture size:  4-0    Suture material:  Nylon    Suture technique:  Simple interrupted    Number of sutures:  6  Approximation:     Approximation:  Close  Repair type:     Repair type:  Simple  Post-procedure details:     Dressing:  Non-adherent dressing    Procedure completion:  Tolerated well, no immediate complications    Labs Reviewed - No data to display       Imaging Results              X-Ray Foot Complete Right (Final result)  Result time 04/06/25 12:06:21   Procedure changed from X-Ray Foot Complete Left     Final result by Pako Johansen MD (04/06/25 12:06:21)                   Impression:      Negative right foot.      Electronically signed by: Pako Johansen  Date:    04/06/2025  Time:    12:06               Narrative:    EXAMINATION:  XR FOOT COMPLETE 3 VIEW RIGHT    CLINICAL HISTORY:  INJURY; Pain, unspecified    FINDINGS:  Three views of right foot show no fracture, dislocation, or destructive osseous lesion. Soft tissues are unremarkable.                                       Medications   LIDOcaine (PF) 10 mg/ml (1%) injection 100 mg (100 mg Infiltration Given by Provider 4/6/25 7297)   oxyCODONE-acetaminophen 5-325 mg per tablet 1 tablet (1 tablet Oral Given 4/6/25 3140)     Medical Decision Making  Presents with right foot pain.  Onset prior to arrival.  Patient reports he dropped a crock pot on his foot.  He is  rating his pain at a 6/10.  He is up-to-date on his tetanus as he had 1 2 years ago.  There is a laceration to the base of the right great toe.  Patient is on blood thinners.  He has placed a pressure dressing to that foot.  Currently he has no active bleeding.    Amount and/or Complexity of Data Reviewed  Radiology: ordered.     Details: X-ray reveals no acute fracture.  Discussion of management or test interpretation with external provider(s): Patient is given Percocet 5 mg here in the ED.  A foreign body which was a chip of glass was removed from the wound prior to suturing.  The wound has been extensively irrigated with Betadine and saline.  I have placed 6 sutures to the wound.  A nonstick dressing was placed along with an Ace wrap.  He was given crutches for ambulation in his do not want him to place weight to this foot.  I have explained to the patient he is to keep it elevated and stay off of it.  I do not want him to popped any of the sutures.  At no time while in the ED did he ever appear to be in any acute distress.  I have given him return precautions.  He has been instructed keep that right leg elevated at all times with no weight-bearing.  He will return to the ED or his primary care doctor in 10 days for suture removal.    Risk  Prescription drug management.                                      Clinical Impression:  Final diagnoses:  [R52] Pain  [S91.311A] Foot laceration, right, initial encounter (Primary)          ED Disposition Condition    Discharge Stable          ED Prescriptions       Medication Sig Dispense Start Date End Date Auth. Provider    diclofenac (VOLTAREN) 50 MG EC tablet Take 1 tablet (50 mg total) by mouth 3 (three) times daily as needed. 20 tablet 4/6/2025 -- Kelly Mixon NP          Follow-up Information       Follow up With Specialties Details Why Contact Info    Hilda Bronson, [facility] In 10 days For suture removal [address]  [id number]  [phone]                 [1]   Social History  Tobacco Use    Smoking status: Former     Current packs/day: 0.00     Average packs/day: 1 pack/day for 5.0 years (5.0 ttl pk-yrs)     Types: Cigarettes     Start date:      Quit date: 2015     Years since quittin.5    Smokeless tobacco: Current     Types: Chew    Tobacco comments:     1/2 can chew qd   Substance Use Topics    Alcohol use: Yes     Alcohol/week: 4.0 standard drinks of alcohol     Types: 4 Cans of beer per week     Comment: rarely    Drug use: Yes     Frequency: 1.0 times per week     Types: Marijuana     Comment: Hx: Heroin (last use 2012) smokes weed ocassionally         Kelly Mixon NP  [phone]

## 2025-04-06 NOTE — DISCHARGE INSTRUCTIONS
Please keep your wound clean and dry.  No weight-bearing as this will cause the sutures to pop.  Have the suture removed in 10 days.  Return to the ED for any worsening symptoms or any other concerns.  Use your crutches for ambulation

## 2025-06-10 NOTE — PROGRESS NOTES
Rx EP'd to pharmacy.  Please notify patient.      Requested Prescriptions     Signed Prescriptions Disp Refills    sertraline (ZOLOFT) 50 MG tablet 90 tablet 1     Sig: Take 1 tablet by mouth daily     Authorizing Provider: PEDRO CHILDERS           Electronically signed by Pedro Childers MD on 6/10/2025 at 9:33 AM       Subjective:       Patient ID: Nabil Urias is a 37 y.o. male.    Chief Complaint: Cough, Chest Pain, Sinus Problem, and larynigitis    Cough  This is a new problem. The current episode started 1 to 4 weeks ago. The problem has been waxing and waning. The problem occurs every few minutes. The cough is Productive of sputum. Associated symptoms include ear congestion, ear pain, nasal congestion, postnasal drip and rhinorrhea. Pertinent negatives include no chest pain, chills, fever, headaches, myalgias, rash, sore throat, shortness of breath or wheezing. The symptoms are aggravated by lying down and exercise. He has tried rest and OTC cough suppressant for the symptoms. The treatment provided mild relief. There is no history of asthma.   Sinus Problem  This is a new problem. The current episode started in the past 7 days. The problem has been gradually worsening since onset. There has been no fever. The pain is moderate. Associated symptoms include congestion, coughing, ear pain, a hoarse voice, sinus pressure and sneezing. Pertinent negatives include no chills, diaphoresis, headaches, shortness of breath or sore throat. Past treatments include oral decongestants and lying down. The treatment provided mild relief.   Review of Systems   Constitutional:  Positive for activity change. Negative for appetite change, chills, diaphoresis, fatigue, fever and unexpected weight change.        Obesity   HENT:  Positive for congestion, ear pain, hoarse voice, postnasal drip, rhinorrhea, sinus pressure, sinus pain and sneezing. Negative for ear discharge, hearing loss, sore throat, trouble swallowing and voice change.    Eyes:  Negative for photophobia, pain and visual disturbance.   Respiratory:  Positive for cough. Negative for chest tightness, shortness of breath and wheezing.    Cardiovascular:  Negative for chest pain, palpitations and leg swelling.   Gastrointestinal:  Negative for abdominal pain, constipation, diarrhea,  nausea and vomiting.   Endocrine: Negative for cold intolerance and heat intolerance.   Genitourinary:  Negative for difficulty urinating, dysuria and flank pain.   Musculoskeletal:  Negative for arthralgias, gait problem and myalgias.   Skin:  Negative for rash.   Allergic/Immunologic: Negative for immunocompromised state.   Neurological:  Negative for dizziness, weakness and headaches.   Hematological:  Negative for adenopathy.   Psychiatric/Behavioral:  Negative for agitation, confusion, self-injury and suicidal ideas.      Past Medical History:   Diagnosis Date    Atrial fibrillation     Essential (primary) hypertension     History of cardiac radiofrequency ablation     Hyperlipidemia       Past Surgical History:   Procedure Laterality Date    ABLATION OF ARRHYTHMOGENIC FOCUS FOR ATRIAL FIBRILLATION N/A 2021    Procedure: ABLATION, ARRHYTHMOGENIC FOCUS, FOR ATRIAL FIBRILLATION;  Surgeon: Daniel Handy MD;  Location: [facility] EP LAB;  Service: Cardiology;  Laterality: N/A;  AF, PVI, GABRIELA (Cx if SR), PVI, RFA, CARTO, GEN, GP, 3 PREP    CARDIOVERSION      TONSILLECTOMY         Family History   Problem Relation Age of Onset    Heart attack Maternal Grandfather        Social History     Socioeconomic History    Marital status:    Tobacco Use    Smoking status: Former     Packs/day: 1.00     Years: 5.00     Pack years: 5.00     Types: Cigarettes     Start date:      Quit date: 2015     Years since quittin.5    Smokeless tobacco: Current     Types: Chew    Tobacco comments:     1/2 can chew qd   Substance and Sexual Activity    Alcohol use: Yes     Alcohol/week: 4.0 standard drinks     Types: 4 Cans of beer per week     Comment: rarely    Drug use: Yes     Frequency: 1.0 times per week     Types: Marijuana     Comment: Hx: Heroin (last use 2012) smokes weed ocassionally     Sexual activity: Yes     Partners: Female   Social History Narrative    ** Merged History Encounter **             Current Outpatient Medications   Medication Sig Dispense Refill    apixaban (ELIQUIS) 5 mg Tab Take 1 tablet (5 mg total) by mouth 2 (two) times daily. 60 tablet 11    metoprolol succinate (TOPROL-XL) 25 MG 24 hr tablet Take 0.5 tablets (12.5 mg total) by mouth once daily. 30 tablet 11    valsartan (DIOVAN) 160 MG tablet Take 1 tablet (160 mg total) by mouth once daily. 30 tablet 2    amoxicillin-clavulanate 875-125mg (AUGMENTIN) 875-125 mg per tablet Take 1 tablet by mouth 2 (two) times daily. 20 tablet 0    promethazine-dextromethorphan (PROMETHAZINE-DM) 6.25-15 mg/5 mL Syrp Take 5 mLs by mouth nightly as needed (cough). 180 mL 0     No current facility-administered medications for this visit.       Review of patient's allergies indicates:  No Known Allergies  Objective:      Blood pressure 134/76, pulse 80, temperature 98.1 °F (36.7 °C), height 6' (1.829 m), weight 123.4 kg (272 lb), SpO2 98 %. Body mass index is 36.89 kg/m².   Physical Exam  Vitals and nursing note reviewed.   Constitutional:       General: He is not in acute distress.     Appearance: Normal appearance. He is well-developed. He is ill-appearing.   HENT:      Head: Normocephalic and atraumatic.      Right Ear: External ear normal. A middle ear effusion is present. Tympanic membrane is bulging.      Left Ear: External ear normal. A middle ear effusion is present. Tympanic membrane is injected and bulging.      Nose: Rhinorrhea present. Rhinorrhea is purulent.      Right Turbinates: Enlarged.      Left Turbinates: Enlarged.      Right Sinus: Maxillary sinus tenderness and frontal sinus tenderness present.      Left Sinus: Maxillary sinus tenderness and frontal sinus tenderness present.      Mouth/Throat:      Mouth: Mucous membranes are moist.      Pharynx: Uvula midline. Oropharyngeal exudate and posterior oropharyngeal erythema present. No pharyngeal swelling or uvula swelling.   Eyes:      General: Lids are normal.      Extraocular  Movements: Extraocular movements intact.      Conjunctiva/sclera: Conjunctivae normal.      Pupils: Pupils are equal, round, and reactive to light.   Cardiovascular:      Rate and Rhythm: Normal rate and regular rhythm.      Pulses: Normal pulses.      Heart sounds: Normal heart sounds, S1 normal and S2 normal. No murmur heard.  Pulmonary:      Effort: Pulmonary effort is normal. No respiratory distress.      Breath sounds: Normal breath sounds and air entry. No stridor, decreased air movement or transmitted upper airway sounds. No decreased breath sounds, wheezing or rhonchi.   Abdominal:      General: Bowel sounds are normal.      Palpations: Abdomen is soft.      Tenderness: There is no abdominal tenderness.   Musculoskeletal:         General: Normal range of motion.      Cervical back: Normal range of motion and neck supple.   Lymphadenopathy:      Cervical: Cervical adenopathy present.      Right cervical: Superficial cervical adenopathy present.      Left cervical: Superficial cervical adenopathy present.   Skin:     General: Skin is warm and dry.      Findings: No rash.   Neurological:      Mental Status: He is alert and oriented to person, place, and time.   Psychiatric:         Mood and Affect: Mood normal.         Speech: Speech normal.         Behavior: Behavior normal.         Thought Content: Thought content normal.         Judgment: Judgment normal.           Assessment:       1. Acute cough    2. Acute maxillary sinusitis, recurrence not specified    3. Acute suppurative otitis media of left ear without spontaneous rupture of tympanic membrane, recurrence not specified        Plan:       Nabil was seen today for cough, chest pain, sinus problem and larynigitis.    Diagnoses and all orders for this visit:    Acute cough  -     POCT COVID-19 Rapid Screening (negative)    -     promethazine-dextromethorphan (PROMETHAZINE-DM) 6.25-15 mg/5 mL Syrp; Take 5 mLs by mouth nightly as needed (cough).  -      dexAMETHasone injection 8 mg    Acute maxillary sinusitis, recurrence not specified  -     amoxicillin-clavulanate 875-125mg (AUGMENTIN) 875-125 mg per tablet; Take 1 tablet by mouth 2 (two) times daily.  -     dexAMETHasone injection 8 mg    Acute suppurative otitis media of left ear without spontaneous rupture of tympanic membrane, recurrence not specified  -     amoxicillin-clavulanate 875-125mg (AUGMENTIN) 875-125 mg per tablet; Take 1 tablet by mouth 2 (two) times daily.       Follow up if not improving in 1-2 weeks

## 2025-06-11 ENCOUNTER — OFFICE VISIT (OUTPATIENT)
Dept: FAMILY MEDICINE | Facility: CLINIC | Age: 40
End: 2025-06-11
Payer: COMMERCIAL

## 2025-06-11 VITALS
TEMPERATURE: 98 F | HEIGHT: 72 IN | WEIGHT: 255.06 LBS | OXYGEN SATURATION: 99 % | HEART RATE: 101 BPM | DIASTOLIC BLOOD PRESSURE: 74 MMHG | SYSTOLIC BLOOD PRESSURE: 128 MMHG | BODY MASS INDEX: 34.55 KG/M2

## 2025-06-11 DIAGNOSIS — M25.512 ACUTE PAIN OF LEFT SHOULDER: ICD-10-CM

## 2025-06-11 DIAGNOSIS — F41.9 ANXIETY: Primary | ICD-10-CM

## 2025-06-11 DIAGNOSIS — I48.0 PAROXYSMAL ATRIAL FIBRILLATION: ICD-10-CM

## 2025-06-11 DIAGNOSIS — R06.2 WHEEZING: ICD-10-CM

## 2025-06-11 DIAGNOSIS — M54.9 ACUTE BACK PAIN, UNSPECIFIED BACK LOCATION, UNSPECIFIED BACK PAIN LATERALITY: ICD-10-CM

## 2025-06-11 DIAGNOSIS — E66.811 CLASS 1 OBESITY DUE TO EXCESS CALORIES WITH SERIOUS COMORBIDITY AND BODY MASS INDEX (BMI) OF 34.0 TO 34.9 IN ADULT: ICD-10-CM

## 2025-06-11 DIAGNOSIS — E66.09 CLASS 1 OBESITY DUE TO EXCESS CALORIES WITH SERIOUS COMORBIDITY AND BODY MASS INDEX (BMI) OF 34.0 TO 34.9 IN ADULT: ICD-10-CM

## 2025-06-11 DIAGNOSIS — I10 PRIMARY HYPERTENSION: ICD-10-CM
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
## 2025-06-11 PROCEDURE — 3078F DIAST BP <80 MM HG: CPT | Mod: CPTII,,, | Performed by: NURSE PRACTITIONER

## 2025-06-11 PROCEDURE — 4010F ACE/ARB THERAPY RXD/TAKEN: CPT | Mod: CPTII,,, | Performed by: NURSE PRACTITIONER

## 2025-06-11 PROCEDURE — 99999 PR PBB SHADOW E&M-EST. PATIENT-LVL III: CPT | Mod: PBBFAC,,, | Performed by: NURSE PRACTITIONER

## 2025-06-11 PROCEDURE — 3074F SYST BP LT 130 MM HG: CPT | Mod: CPTII,,, | Performed by: NURSE PRACTITIONER

## 2025-06-11 PROCEDURE — 1160F RVW MEDS BY RX/DR IN RCRD: CPT | Mod: CPTII,,, | Performed by: NURSE PRACTITIONER

## 2025-06-11 PROCEDURE — 99213 OFFICE O/P EST LOW 20 MIN: CPT | Mod: PBBFAC,PN | Performed by: NURSE PRACTITIONER

## 2025-06-11 PROCEDURE — 1159F MED LIST DOCD IN RCRD: CPT | Mod: CPTII,,, | Performed by: NURSE PRACTITIONER

## 2025-06-11 PROCEDURE — 99214 OFFICE O/P EST MOD 30 MIN: CPT | Mod: S$PBB,,, | Performed by: NURSE PRACTITIONER

## 2025-06-11 PROCEDURE — 3008F BODY MASS INDEX DOCD: CPT | Mod: CPTII,,, | Performed by: NURSE PRACTITIONER

## 2025-06-11 RX ORDER — ALBUTEROL SULFATE 90 UG/1
2 INHALANT RESPIRATORY (INHALATION) EVERY 6 HOURS PRN
Qty: 18 G | Refills: 3 | Status: SHIPPED | OUTPATIENT
Start: 2025-06-11

## 2025-06-11 RX ORDER — VALSARTAN 80 MG/1
80 TABLET ORAL DAILY
Qty: 90 TABLET | Refills: 3 | Status: SHIPPED | OUTPATIENT
Start: 2025-06-11 | End: 2026-06-11

## 2025-06-11 RX ORDER — ALBUTEROL SULFATE 90 UG/1
2 INHALANT RESPIRATORY (INHALATION) EVERY 6 HOURS PRN
COMMUNITY
End: 2025-06-11 | Stop reason: SDUPTHER

## 2025-06-11 RX ORDER — ALPRAZOLAM 0.5 MG/1
0.5 TABLET ORAL NIGHTLY PRN
Qty: 30 TABLET | Refills: 2 | Status: SHIPPED | OUTPATIENT
Start: 2025-06-11
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
## 2025-06-23 ENCOUNTER — HOSPITAL ENCOUNTER (EMERGENCY)
Facility: HOSPITAL | Age: 40
Discharge: HOME | End: 2025-06-23
Attending: EMERGENCY MEDICINE
Payer: MEDICAID

## 2025-06-23 VITALS
RESPIRATION RATE: 19 BRPM | DIASTOLIC BLOOD PRESSURE: 99 MMHG | HEIGHT: 72 IN | TEMPERATURE: 98 F | OXYGEN SATURATION: 99 % | WEIGHT: 254 LBS | SYSTOLIC BLOOD PRESSURE: 153 MMHG | HEART RATE: 85 BPM | BODY MASS INDEX: 34.4 KG/M2

## 2025-06-23 DIAGNOSIS — R93.0 ABNORMAL HEAD CT: ICD-10-CM

## 2025-06-23 DIAGNOSIS — G93.89 ENCEPHALOMALACIA ON IMAGING STUDY: ICD-10-CM

## 2025-06-23 DIAGNOSIS — R00.2 PALPITATIONS: Primary | ICD-10-CM

## 2025-06-23 DIAGNOSIS — R51.9 NONINTRACTABLE HEADACHE, UNSPECIFIED CHRONICITY PATTERN, UNSPECIFIED HEADACHE TYPE: ICD-10-CM

## 2025-06-23 LAB
ABSOLUTE EOSINOPHIL (SMH): 0.36 K/UL
ABSOLUTE MONOCYTE (SMH): 1.01 K/UL (ref 0.3–1)
ABSOLUTE NEUTROPHIL COUNT (SMH): 9.3 K/UL (ref 1.8–7.7)
ALBUMIN SERPL-MCNC: 4.6 G/DL (ref 3.5–5.2)
ALP SERPL-CCNC: 72 UNIT/L (ref 55–135)
ALT SERPL-CCNC: 17 UNIT/L (ref 10–44)
ANION GAP (SMH): 11 MMOL/L (ref 8–16)
AST SERPL-CCNC: 20 UNIT/L (ref 10–40)
BASOPHILS # BLD AUTO: 0.11 K/UL
BASOPHILS NFR BLD AUTO: 0.8 %
BILIRUB SERPL-MCNC: 1 MG/DL (ref 0.1–1)
BNP SERPL-MCNC: 4 PG/ML
BUN SERPL-MCNC: 17 MG/DL (ref 6–20)
CALCIUM SERPL-MCNC: 9.5 MG/DL (ref 8.7–10.5)
CHLORIDE SERPL-SCNC: 105 MMOL/L (ref 95–110)
CO2 SERPL-SCNC: 23 MMOL/L (ref 23–29)
CREAT SERPL-MCNC: 1.2 MG/DL (ref 0.5–1.4)
ERYTHROCYTE [DISTWIDTH] IN BLOOD BY AUTOMATED COUNT: 14.5 % (ref 11.5–14.5)
GFR SERPLBLD CREATININE-BSD FMLA CKD-EPI: >60 ML/MIN/1.73/M2
GLUCOSE SERPL-MCNC: 101 MG/DL (ref 70–110)
HCT VFR BLD AUTO: 53.4 % (ref 40–54)
HGB BLD-MCNC: 18.4 GM/DL (ref 14–18)
IMM GRANULOCYTES # BLD AUTO: 0.14 K/UL (ref 0–0.04)
IMM GRANULOCYTES NFR BLD AUTO: 1.1 % (ref 0–0.5)
LYMPHOCYTES # BLD AUTO: 2.2 K/UL (ref 1–4.8)
MAGNESIUM SERPL-MCNC: 1.8 MG/DL (ref 1.6–2.6)
MCH RBC QN AUTO: 32.5 PG (ref 27–31)
MCHC RBC AUTO-ENTMCNC: 34.5 G/DL (ref 32–36)
MCV RBC AUTO: 94 FL (ref 82–98)
NUCLEATED RBC (/100WBC) (SMH): 0 /100 WBC
PLATELET # BLD AUTO: 198 K/UL (ref 150–450)
PMV BLD AUTO: 10.7 FL (ref 9.2–12.9)
POTASSIUM SERPL-SCNC: 3.6 MMOL/L (ref 3.5–5.1)
PROT SERPL-MCNC: 7.3 GM/DL (ref 6–8.4)
RBC # BLD AUTO: 5.67 M/UL (ref 4.6–6.2)
RELATIVE EOSINOPHIL (SMH): 2.7 % (ref 0–8)
RELATIVE LYMPHOCYTE (SMH): 16.8 % (ref 18–48)
RELATIVE MONOCYTE (SMH): 7.7 % (ref 4–15)
RELATIVE NEUTROPHIL (SMH): 70.9 % (ref 38–73)
SODIUM SERPL-SCNC: 139 MMOL/L (ref 136–145)
TROPONIN HIGH SENSITIVE (SMH): 11 PG/ML
TROPONIN HIGH SENSITIVE (SMH): 11.9 PG/ML
WBC # BLD AUTO: 13.13 K/UL (ref 3.9–12.7)

## 2025-06-23 PROCEDURE — 82040 ASSAY OF SERUM ALBUMIN: CPT | Performed by: EMERGENCY MEDICINE

## 2025-06-23 PROCEDURE — 99285 EMERGENCY DEPT VISIT HI MDM: CPT | Mod: 25

## 2025-06-23 PROCEDURE — 93010 ELECTROCARDIOGRAM REPORT: CPT | Mod: ,,, | Performed by: INTERNAL MEDICINE

## 2025-06-23 PROCEDURE — 93005 ELECTROCARDIOGRAM TRACING: CPT | Performed by: INTERNAL MEDICINE

## 2025-06-23 PROCEDURE — 84484 ASSAY OF TROPONIN QUANT: CPT | Performed by: EMERGENCY MEDICINE

## 2025-06-23 PROCEDURE — 83880 ASSAY OF NATRIURETIC PEPTIDE: CPT | Performed by: EMERGENCY MEDICINE

## 2025-06-23 PROCEDURE — 85025 COMPLETE CBC W/AUTO DIFF WBC: CPT | Performed by: EMERGENCY MEDICINE

## 2025-06-23 PROCEDURE — 83735 ASSAY OF MAGNESIUM: CPT | Performed by: EMERGENCY MEDICINE

## 2025-06-23 RX ORDER — LORAZEPAM 2 MG/ML
1 INJECTION INTRAMUSCULAR
Status: DISCONTINUED | OUTPATIENT
Start: 2025-06-23 | End: 2025-06-23 | Stop reason: HOSPADM

## 2025-06-23 NOTE — ED NOTES
Pt returned from mri. He states he is claustrophobic, unable to tolerate procedure. Dr. Palmer informed.

## 2025-06-23 NOTE — ED PROVIDER NOTES
"Encounter Date: 6/23/2025       History     Chief Complaint   Patient presents with    Hypertension    Palpitations    Eye Problem     Pt reports having headaches all over his head for the last few weeks and problems with "floaters" in both his eyes for a couple days.  Pt reports hx of hypertension and afib    Headache     Patient presents complaining of palpitations, headache, occasionally seeing floaters, high blood pressure.  Patient with previous history of hypertension and atrial fibrillation.  Patient is status post an ablation.  Patient has not been on blood pressure medication or Eliquis therapy for over a year.  He is scheduled to see a cardiologist next month.  Patient became concerned when he had more frequent headaches over the last few weeks with associated feeling of heart racing and generally not feeling well.  He denies any true chest pain.  He denies any loss of vision.  He states sometimes he feels like his vision is blurry.  He denies any unilateral numbness tingling or weakness.  He describes his headache as mild-to-moderate.  No thunderclap onset.  He is not on blood thinners.      Review of patient's allergies indicates:  No Known Allergies  Past Medical History:   Diagnosis Date    Atrial fibrillation     Essential (primary) hypertension     History of cardiac radiofrequency ablation     Hyperlipidemia      Past Surgical History:   Procedure Laterality Date    ABLATION OF ARRHYTHMOGENIC FOCUS FOR ATRIAL FIBRILLATION N/A 1/21/2021    Procedure: ABLATION, ARRHYTHMOGENIC FOCUS, FOR ATRIAL FIBRILLATION;  Surgeon: Daniel Handy MD;  Location: [facility] EP LAB;  Service: Cardiology;  Laterality: N/A;  AF, PVI, GABRIELA (Cx if SR), PVI, RFA, CARTO, GEN, GP, 3 PREP    CARDIOVERSION      TONSILLECTOMY  1990     Family History   Problem Relation Name Age of Onset    Heart attack Maternal Grandfather       Social History[1]  Review of Systems   All other systems reviewed and are negative.      Physical Exam "     Initial Vitals [06/23/25 0619]   BP Pulse Resp Temp SpO2   (!) 148/95 109 18 97.7 °F (36.5 °C) 99 %      MAP       --         Physical Exam    Nursing note and vitals reviewed.  Constitutional: He appears well-developed and well-nourished. He is not diaphoretic. No distress.   HENT:   Head: Normocephalic and atraumatic. Mouth/Throat: Oropharynx is clear and moist.   Eyes: EOM are normal.   Neck: Neck supple.   Normal range of motion.  Cardiovascular:  Normal rate, regular rhythm, normal heart sounds and intact distal pulses.           Pulmonary/Chest: Breath sounds normal. No respiratory distress.   Abdominal: Abdomen is soft.   Musculoskeletal:         General: Normal range of motion.      Cervical back: Normal range of motion and neck supple.     Neurological: He is alert and oriented to person, place, and time. He has normal strength.   Vision-normal  Neglect-normal  Aphasia - normal  Pronator drift - normal  Cerebellum - normal   Skin: Skin is warm and dry.   Psychiatric: He has a normal mood and affect. His behavior is normal. Judgment and thought content normal.         ED Course   Procedures  Labs Reviewed   CBC WITH DIFFERENTIAL - Abnormal       Result Value    WBC 13.13 (*)     RBC 5.67      Hgb 18.4 (*)     Hct 53.4      MCV 94      MCH 32.5 (*)     MCHC 34.5      RDW 14.5      Platelet Count 198      MPV 10.7      Nucleated RBC 0      Neut % 70.9      Lymph % 16.8 (*)     Mono % 7.7      Eos % 2.7      Basophil % 0.8      Imm Grans % 1.1 (*)     Neut # 9.3 (*)     Lymph # 2.20      Mono # 1.01 (*)     Eos # 0.36      Baso # 0.11      Imm Grans # 0.14 (*)    MAGNESIUM - Normal    Magnesium 1.8     COMPREHENSIVE METABOLIC PANEL - Normal    Sodium 139      Potassium 3.6      Chloride 105      CO2 23      Glucose 101      BUN 17      Creatinine 1.2      Calcium 9.5      Protein Total 7.3      Albumin 4.6      Bilirubin Total 1.0      ALP 72      AST 20      ALT 17      Anion Gap 11      eGFR >60      TROPONIN I HIGH SENSITIVITY - Normal    Troponin High Sensitive 11.0     B-TYPE NATRIURETIC PEPTIDE - Normal    BNP 4     TROPONIN I HIGH SENSITIVITY - Normal    Troponin High Sensitive 11.9     CBC W/ AUTO DIFFERENTIAL    Narrative:     The following orders were created for panel order CBC auto differential.  Procedure                               Abnormality         Status                     ---------                               -----------         ------                     CBC with Differential[[id number]]       Abnormal            Final result                 Please view results for these tests on the individual orders.   EXTRA TUBES    Narrative:     The following orders were created for panel order EXTRA TUBES.  Procedure                               Abnormality         Status                     ---------                               -----------         ------                     Light Blue Top Hold[[id number]]                             In process                   Please view results for these tests on the individual orders.   LIGHT BLUE TOP HOLD        ECG Results              EKG 12-lead (In process)        Collection Time Result Time QRS Duration OHS QTC Calculation    06/23/25 06:12:02 06/23/25 06:51:13 82 439                     In process by Interface, Lab In [facility] (06/23/25 06:51:19)                   Narrative:    Test Reason : R07.9,    Vent. Rate : 109 BPM     Atrial Rate : 109 BPM     P-R Int : 124 ms          QRS Dur :  82 ms      QT Int : 326 ms       P-R-T Axes :  71 102  33 degrees    QTcB Int : 439 ms    Sinus tachycardia  Rightward axis  Possible Inferior infarct ,age undetermined  Cannot rule out Anterior infarct ,age undetermined  Abnormal ECG  When compared with ECG of 26-Aug-2024 10:37,  No significant change was found    Referred By: AAAREFERRAL SELF           Confirmed By:                                   Imaging Results              CT Head Without Contrast (Final  result)  Result time 06/23/25 07:55:01      Final result by Roland Frost DO (06/23/25 07:55:01)                   Impression:      Focal encephalomalacia of the right parietal lobe.  No acute intracranial abnormality otherwise observed.      Electronically signed by: Roland Frost  Date:    06/23/2025  Time:    07:55               Narrative:      CMS MANDATED QUALITY DATA - CT RADIATION - 436    All CT scans at this facility utilize dose modulation, iterative reconstruction, and/or weight based dosing when appropriate to reduce radiation dose to as low as reasonably achievable.    EXAMINATION:  CT HEAD WITHOUT CONTRAST    CLINICAL HISTORY:  Dizziness, persistent/recurrent, cardiac or vascular cause suspected;Headache, new or worsening, neuro deficit (Age 19-49y);    TECHNIQUE:  Head CT without IV contrast.    COMPARISON:  CT brain 08/17/2023.    FINDINGS:  There is a focus of encephalomalacia in the right parietal lobe.  Gray-white matter distinction is otherwise preserved throughout the brain.  No intracranial hemorrhage.The ventricles and cisterns are maintained.Calvarium is intact. There is mucoperiosteal thickening of the right maxillary sinus.                                       X-Ray Chest AP Portable (Final result)  Result time 06/23/25 07:16:28      Final result by Roland Frost DO (06/23/25 07:16:28)                   Impression:      No acute cardiopulmonary abnormality.      Electronically signed by: Roland Frost  Date:    06/23/2025  Time:    07:16               Narrative:    EXAMINATION:  XR CHEST AP PORTABLE    CLINICAL HISTORY:  Chest Pain;    FINDINGS:  Portable chest with comparison chest x-ray 08/17/2023.  Normal cardiomediastinal silhouette.Lungs are clear. Pulmonary vasculature is normal. No acute osseous abnormality.                                       Medications - No data to display  Medical Decision Making  Patient is not in distress    Considerations include but are not limited  to acute kidney injury, dehydration, electrolyte abnormalities, acute coronary syndrome, dysrhythmia, less likely intracranial hemorrhage    MDM    Patient presents for emergent evaluation of acute palpitations, headache that poses a threat to life and/or bodily function.    In the ED patient found to have acute palpitations, headache, abnormal head CT.     Amount and/or complexity of data reviewed   I ordered labs and personally reviewed them.  Labs significant for negative troponin x2.  I ordered X-rays and personally reviewed them and reviewed the radiologist interpretation.  Xray significant for no acute cardiopulmonary process.    I ordered EKG and personally reviewed it.  EKG significant for regular rate and rhythm without ST elevation.    I ordered CT scan and personally reviewed it and reviewed the radiologist interpretation.  CT significant for encephalomalacia, right parietal.      I did review prior medical records.    Social determinants of health - none    Discharge MDM and Risk    Patient remains in no acute distress with no active ongoing symptoms.  He has a negative cardiac workup.  His head CT shows no acute abnormality however there is an area of encephalomalacia of uncertain etiology.  I attempted to images further with an MRI although unlikely this to be acute with these findings on CT.  Unfortunately patient unable to tolerate MRI.  He does have a follow up appointment upcoming I will put in a referral to Neurology and it did advise him that he does need to investigate this further.  Perhaps this is an area of old injury or possible old infarct.  Today patient is having no focal deficits and has a normal neurological exam.  I did give him detailed reasons to return.  Shared decision-making with the patient regarding diagnosis, treatment, and plan was well received.    Patient was discharged in stable condition.  Detailed return precautions discussed.    Amount and/or Complexity of Data  Reviewed  Labs: ordered. Decision-making details documented in ED Course.  Radiology: ordered.                   ED Course as of 25 1451   Mon 2025   1013 Troponin I High Sensitivity: 11.9 [AP]   1013 Sodium: 139 [AP]   1013 Potassium: 3.6 [AP]   1013 Chloride: 105 [AP]   1013 CO2: 23 [AP]   1013 Glucose: 101 [AP]   1013 BUN: 17 [AP]   1013 BILIRUBIN TOTAL: 1.0 [AP]   1013 AST: 20 [AP]   1013 ALT: 17 [AP]   1013 eGFR: >60 [AP]   1013 Anion Gap: 11 [AP]   1013 ALT: 17 [AP]   1013 WBC(!): 13.13 [AP]   1013 Hemoglobin(!): 18.4 [AP]   1013 Hematocrit: 53.4 [AP]   1013 Platelet Count: 198 [AP]   1013 BNP: 4 [AP]   1013 Troponin I High Sensitivity: 11.9 [AP]      ED Course User Index  [AP] Bryce Palmer MD                           Clinical Impression:  Final diagnoses:  [R00.2] Palpitations (Primary)  [R51.9] Nonintractable headache, unspecified chronicity pattern, unspecified headache type  [R93.0] Abnormal head CT  [G93.89] Encephalomalacia on imaging study          ED Disposition Condition    Discharge Stable          ED Prescriptions    None       Follow-up Information       Follow up With Specialties Details Why Contact Info    Hilda Bronson FNP-C Family Medicine Schedule an appointment as soon as possible for a visit in 1 week  [address] [facility] [address]  [phone]                     [1]   Social History  Tobacco Use    Smoking status: Former     Current packs/day: 0.00     Average packs/day: 1 pack/day for 5.0 years (5.0 ttl pk-yrs)     Types: Cigarettes     Start date:      Quit date: 2015     Years since quittin.7    Smokeless tobacco: Current     Types: Chew    Tobacco comments:     1/2 can chew qd   Substance Use Topics    Alcohol use: Yes     Alcohol/week: 4.0 standard drinks of alcohol     Types: 4 Cans of beer per week     Comment: rarely    Drug use: Yes     Frequency: 1.0 times per week     Types: Marijuana     Comment: Hx: Heroin (last use 2012) smokes  Bryce Lanier MD  06/23/25 [id number]

## 2025-06-23 NOTE — DISCHARGE INSTRUCTIONS
Head CT today shows abnormality that does need close follow up.  Attempt at MRI unsuccessful today unable to tolerate closed MRI.  Very important to have close follow up on this and further evaluation and treatment.

## 2025-06-24 ENCOUNTER — TELEPHONE (OUTPATIENT)
Dept: NEUROLOGY | Facility: CLINIC | Age: 40
End: 2025-06-24
Payer: MEDICAID

## 2025-06-24 ENCOUNTER — TELEPHONE (OUTPATIENT)
Dept: FAMILY MEDICINE | Facility: CLINIC | Age: 40
End: 2025-06-24
Payer: MEDICAID

## 2025-06-24 DIAGNOSIS — G44.89 OTHER HEADACHE SYNDROME: ICD-10-CM

## 2025-06-24 DIAGNOSIS — G93.89 ENCEPHALOMALACIA: Primary | ICD-10-CM

## 2025-06-24 NOTE — TELEPHONE ENCOUNTER
Consulted Dr Yost with the referral from the ER for headache. After reviewing the CT and ER notes she recommends pt to see his Cardiologist and then general neurology. Spoke with General Neurology staff to call pt to schedule in their clinic.

## 2025-06-24 NOTE — TELEPHONE ENCOUNTER
----- Message from Paola sent at 6/24/2025  9:44 AM CDT -----  Pt wife is calling and he was in [facility] er yesterday for possible afib, tingling, blurred vision, headache. It was not his heart so they sent him home and told him to f/u as CT showed brain damage and to f/u with neuro and pcp. A referral for neuro was not put in. Please advise and schedule him   Kendall [phone]

## 2025-06-24 NOTE — TELEPHONE ENCOUNTER
Spoke with pt's wife Ms. Saldaña regarding headache referral in pt's chart. Informed her that we may not be able to schedule him with any if the providers if referral wasn't in house. Ms. Saldaña stated that they referral comes from an [facility] facility at [facility]. Ms. Saldaña stated that she wasn't sure why the referral was sent to us when it is more than just headaches as a symptom.     According to the CT scan done, the provider discovered something on the pt's frontal lobe which could be a contributing factor of many other symptoms such as nausea and dizzy spells. I informed Ms. Saldaña with further information from the RN Jessika who stated that we will give her a call back once we have discussed with the providers and looked into his chart for further evaluation on where he needs to be seen.     In the meantime, she  will get him scheduled with his PCP to be seen and to see if headache is the right department he needs to be seen. Stated that I will call back with an answer from the providers later today if possible. Verbalized understanding.         Copied from CRM #[id number]. Topic: Appointments - Amb Referral  >> Jun 24, 2025  9:13 AM Angela wrote:  Type:  Sooner Appointment Request    Caller is requesting a sooner appointment.  Caller declined first available appointment listed below.  Caller will not accept being placed on the waitlist and is requesting a message be sent to doctor.    Name of Caller:  pt's wife  When is the first available appointment?  Medicaid pt  Symptoms:  R51.9 (ICD-10-CM) - Nonintractable headache, unspecified chronicity pattern, unspecified headache type  Would the patient rather a call back or a response via [facility]? Call  Best Call Back Number:  [phone]  Additional Information:  Please call back to advise. Thanks!

## 2025-06-24 NOTE — TELEPHONE ENCOUNTER
Referral placed for neurology for patient to be contacted for appt with a neurologist in [place]. Patient's wife notified.

## 2025-06-25 DIAGNOSIS — R51.9 INTRACTABLE EPISODIC HEADACHE, UNSPECIFIED HEADACHE TYPE: Primary | ICD-10-CM

## 2025-06-25 NOTE — TELEPHONE ENCOUNTER
----- Message from Radha sent at 6/25/2025 12:08 PM CDT -----  Pt called to get something called in for his headaches. Pt uses [facility]. Pt stated that he tried everything over the counter.    [phone]

## 2025-06-26 RX ORDER — RIMEGEPANT SULFATE 75 MG/75MG
75 TABLET, ORALLY DISINTEGRATING ORAL ONCE AS NEEDED
Qty: 16 TABLET | Refills: 5 | Status: SHIPPED | OUTPATIENT
Start: 2025-06-26 | End: 2025-06-27 | Stop reason: SDUPTHER

## 2025-06-26 NOTE — TELEPHONE ENCOUNTER
I am sending [facility] for patient's headaches. It is a headache specific medication that is safe for him to take with his cardiac history. Max is one per day.

## 2025-06-27 LAB
OHS QRS DURATION: 82 MS
OHS QTC CALCULATION: 439 MS

## 2025-06-27 RX ORDER — RIMEGEPANT SULFATE 75 MG/75MG
75 TABLET, ORALLY DISINTEGRATING ORAL DAILY PRN
Qty: 16 TABLET | Refills: 5 | Status: SHIPPED | OUTPATIENT
Start: 2025-06-27

## 2025-06-27 NOTE — TELEPHONE ENCOUNTER
Pt asked if you could send his nurtec to [facility] pharmacy. Meds pended to correct pharm.   Lov 6/11/25  Nov 9/17/25

## 2025-07-03 ENCOUNTER — OFFICE VISIT (OUTPATIENT)
Dept: CARDIOLOGY | Facility: CLINIC | Age: 40
End: 2025-07-03
Payer: MEDICAID

## 2025-07-03 VITALS
WEIGHT: 246.81 LBS | HEIGHT: 72 IN | DIASTOLIC BLOOD PRESSURE: 70 MMHG | SYSTOLIC BLOOD PRESSURE: 122 MMHG | HEART RATE: 91 BPM | OXYGEN SATURATION: 98 % | BODY MASS INDEX: 33.43 KG/M2

## 2025-07-03 DIAGNOSIS — R06.09 DYSPNEA ON EXERTION: Primary | ICD-10-CM

## 2025-07-03 DIAGNOSIS — R07.9 CHEST PAIN, UNSPECIFIED TYPE: ICD-10-CM

## 2025-07-03 DIAGNOSIS — F51.01 PRIMARY INSOMNIA: ICD-10-CM

## 2025-07-03 DIAGNOSIS — F41.9 ANXIETY: ICD-10-CM

## 2025-07-03 PROCEDURE — 99213 OFFICE O/P EST LOW 20 MIN: CPT | Mod: S$PBB,,, | Performed by: INTERNAL MEDICINE

## 2025-07-03 PROCEDURE — 1160F RVW MEDS BY RX/DR IN RCRD: CPT | Mod: CPTII,,, | Performed by: INTERNAL MEDICINE

## 2025-07-03 PROCEDURE — 99999 PR PBB SHADOW E&M-EST. PATIENT-LVL IV: CPT | Mod: PBBFAC,,, | Performed by: INTERNAL MEDICINE

## 2025-07-03 PROCEDURE — 3008F BODY MASS INDEX DOCD: CPT | Mod: CPTII,,, | Performed by: INTERNAL MEDICINE

## 2025-07-03 PROCEDURE — 1159F MED LIST DOCD IN RCRD: CPT | Mod: CPTII,,, | Performed by: INTERNAL MEDICINE

## 2025-07-03 PROCEDURE — 3074F SYST BP LT 130 MM HG: CPT | Mod: CPTII,,, | Performed by: INTERNAL MEDICINE

## 2025-07-03 PROCEDURE — 99214 OFFICE O/P EST MOD 30 MIN: CPT | Mod: PBBFAC,PN | Performed by: INTERNAL MEDICINE

## 2025-07-03 PROCEDURE — 4010F ACE/ARB THERAPY RXD/TAKEN: CPT | Mod: CPTII,,, | Performed by: INTERNAL MEDICINE

## 2025-07-03 PROCEDURE — 3078F DIAST BP <80 MM HG: CPT | Mod: CPTII,,, | Performed by: INTERNAL MEDICINE

## 2025-07-03 RX ORDER — METOPROLOL SUCCINATE 25 MG/1
37.5 TABLET, EXTENDED RELEASE ORAL DAILY
Qty: 45 TABLET | Refills: 11 | Status: SHIPPED | OUTPATIENT
Start: 2025-07-03
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
## 2025-07-08 ENCOUNTER — OFFICE VISIT (OUTPATIENT)
Dept: NEUROLOGY | Facility: CLINIC | Age: 40
End: 2025-07-08
Payer: MEDICAID

## 2025-07-08 ENCOUNTER — TELEPHONE (OUTPATIENT)
Dept: NEUROLOGY | Facility: CLINIC | Age: 40
End: 2025-07-08
Payer: MEDICAID

## 2025-07-08 VITALS — DIASTOLIC BLOOD PRESSURE: 94 MMHG | SYSTOLIC BLOOD PRESSURE: 125 MMHG | RESPIRATION RATE: 16 BRPM

## 2025-07-08 DIAGNOSIS — G44.89 OTHER HEADACHE SYNDROME: ICD-10-CM

## 2025-07-08 DIAGNOSIS — G47.33 OSA (OBSTRUCTIVE SLEEP APNEA): ICD-10-CM

## 2025-07-08 DIAGNOSIS — R42 DIZZINESS AND GIDDINESS: ICD-10-CM

## 2025-07-08 DIAGNOSIS — G43.711 INTRACTABLE CHRONIC MIGRAINE WITHOUT AURA AND WITH STATUS MIGRAINOSUS: ICD-10-CM

## 2025-07-08 DIAGNOSIS — I63.89 OTHER CEREBRAL INFARCTION: ICD-10-CM

## 2025-07-08 DIAGNOSIS — G93.89 ENCEPHALOMALACIA: Primary | ICD-10-CM

## 2025-07-08 PROCEDURE — 99999 PR PBB SHADOW E&M-EST. PATIENT-LVL V: CPT | Mod: PBBFAC,,, | Performed by: NURSE PRACTITIONER

## 2025-07-08 PROCEDURE — 99215 OFFICE O/P EST HI 40 MIN: CPT | Mod: PBBFAC,PO | Performed by: NURSE PRACTITIONER

## 2025-07-08 NOTE — TELEPHONE ENCOUNTER
----- Message from PRITESH Perrin sent at 7/8/2025  3:53 PM CDT -----  Please call pt and inform him that I will send in a medication called Ubrelvy. It will be send to the [facility] in [place] in order for it be covered. He is to take 1 tablet EVERY OTHER DAY. He is to also take magnesium nightly.
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
## 2025-07-08 NOTE — Clinical Note
Please call pt and inform him that I will send in a medication called Ubrelvy. It will be send to the [facility] pharmacy in [place] in order for it be covered. He is to take 1 tablet EVERY OTHER DAY. He is to also take magnesium nightly.

## 2025-07-08 NOTE — PATIENT INSTRUCTIONS
1- For preventive management: a. Start magnesium daily (pick one of the following preparations)                                      -- start magnesium in ONE of the following preparations -               1. Magnesium oxide 400 -500 mg daily (the most common over the counter kind, may causes loose stools)              2. Magnesium citrate 400-500mg daily (harder to find, but more neutral on the bowels)              3. Magnesium glycinate 400mg daily (hardest to find, look online, but most bowel-neutral, best absorbed)                                       B. Start riboflavin (vitamin b2) 200mg twice a day (this will discolor your urine)

## 2025-07-08 NOTE — ASSESSMENT & PLAN NOTE
Recent CTH noted with R frontal/parietal encephalomalacia that is new from last CTH in 2023  No reports of recent trauma or stroke-like symptoms  Obtain MRI brain and vessel imaging
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
## 2025-07-10 ENCOUNTER — HOSPITAL ENCOUNTER (OUTPATIENT)
Dept: CARDIOLOGY | Facility: CLINIC | Age: 40
Discharge: HOME | End: 2025-07-10
Attending: INTERNAL MEDICINE
Payer: MEDICAID

## 2025-07-10 DIAGNOSIS — F41.9 ANXIETY: ICD-10-CM

## 2025-07-10 DIAGNOSIS — R07.9 CHEST PAIN, UNSPECIFIED TYPE: ICD-10-CM

## 2025-07-10 DIAGNOSIS — F51.01 PRIMARY INSOMNIA: ICD-10-CM

## 2025-07-10 DIAGNOSIS — R06.09 DYSPNEA ON EXERTION: ICD-10-CM

## 2025-07-16 ENCOUNTER — PROCEDURE VISIT (OUTPATIENT)
Dept: NEUROLOGY | Facility: HOSPITAL | Age: 40
End: 2025-07-16
Attending: NURSE PRACTITIONER
Payer: MEDICAID

## 2025-07-16 DIAGNOSIS — R42 DIZZINESS AND GIDDINESS: ICD-10-CM

## 2025-07-16 DIAGNOSIS — G93.89 ENCEPHALOMALACIA: ICD-10-CM

## 2025-07-16 PROCEDURE — 95816 EEG AWAKE AND DROWSY: CPT

## 2025-07-16 NOTE — PROGRESS NOTES
VIDEO ELECTROENCEPHALOGRAM  REPORT    DATE OF SERVICE:7/16/25  EEG NUMBER: ON 25- [phone]  REQUESTED BY:  PRITESH Perrin  LOCATION OF SERVICE:  outpatient    METHODOLOGY   Electroencephalographic (EEG) recording is with electrodes placed according to the International 10-20 placement system.  Thirty two (32) channels of digital signal (sampling rate of 512/sec) including T1 and T2 was simultaneously recorded from the scalp and may include  EKG, EMG, and/or eye monitors.  Recording band pass was 0.1 to 512 hz.  Digital video recording of the patient is simultaneously recorded with the EEG.  The patient is instructed report clinical symptoms which may occur during the recording session.  EEG and video recording is stored and archived in digital format.  Activation procedures which include photic stimulation, hyperventilation and instructing patients to perform simple task are done in selected patients.   The EEG is displayed on a monitor screen and can be reviewed using different montages.  Computer assisted analysis is employed to detect spike and electrographic seizure activity.   The entire record is submitted for computer analysis.  The entire recording is visually reviewed and the times identified by computer analysis as being spikes or seizures are reviewed again.  Compresses spectral analysis (CSA) is also performed on the activity recorded from each individual channel.  This is displayed as a power display of frequencies from 0 to 30 Hz over time.   The CSA is reviewed looking for asymmetries in power between homologous areas of the scalp and then compared with the original EEG recording.     [employer] software was also utilized in the review of this study.  This software suite analyzes the EEG recording in multiple domains.  Coherence and rhythmicity is computed to identify EEG sections which may contain organized seizures.  Each channel undergoes analysis to detect presence of spike and sharp waves which  have special and morphological characteristic of epileptic activity.  The routine EEG recording is converted from spacial into frequency domain.  This is then displayed comparing homologous areas to identify areas of significant asymmetry.  Algorithm to identify non-cortically generated artifact is used to separate eye movement, EMG and other artifact from the EEG.      Indication:  39 y.o. male with a PMHX of sleep apnea, former smoker, afib, HLD, HTN with recurrent spells of dizziness/near syncope and visual changes.  The patient is not on anti-seizure medications.     State of Consciousness:   Awake and asleep    Background:   The background is well organized, symmetric and continuous.  There is a normal anterior to posterior gradient consisting of 5-10 mcV amplitude beta activity in the frontal region and well defined alpha activity in the posterior region.   At maximum alertness, there is a well developed 11-12 Hz posterior dominant rhythm that is symmetric and reactive to eye opening and closure noted.  There is an excessive amount of beta activity seen throughout the study.    Sleep:   Transition into stage 1 sleep is characterized by attenuation of the posterior dominant rhythm, bilateral theta activity, and vertex waves.  There is also transition into stage II sleep with symmetric vertex waves, sleep spindles, and k complexes noted.     Epileptiform Abnormalities  None    Seizures/Events:   None    EKG:   Regular rate and rhythm on single lead EKG    Activating procedures:   - Hyperventilation: not performed  - Photic stimulation: symmetric photic driving, no epileptiform discharges elicited     Impression:   This is an essentially normal awake and sleep EEG.  The presence of excessive fast activity is often seen secondary to the effects of sedative medications (benzodiazepines, barbiturates).  No epileptiform discharges or focal abnormalities are noted.     Julia Castro MD  [facility]    Department of Neurology/Epilepsy

## 2025-07-23 ENCOUNTER — HOSPITAL ENCOUNTER (OUTPATIENT)
Dept: RADIOLOGY | Facility: HOSPITAL | Age: 40
Discharge: HOME | End: 2025-07-23
Attending: NURSE PRACTITIONER
Payer: MEDICAID

## 2025-07-23 DIAGNOSIS — R42 DIZZINESS AND GIDDINESS: ICD-10-CM

## 2025-07-23 DIAGNOSIS — I63.89 OTHER CEREBRAL INFARCTION: ICD-10-CM

## 2025-07-23 PROCEDURE — 70496 CT ANGIOGRAPHY HEAD: CPT | Mod: 26,,, | Performed by: RADIOLOGY

## 2025-07-23 PROCEDURE — 70496 CT ANGIOGRAPHY HEAD: CPT | Mod: TC,PO

## 2025-07-23 PROCEDURE — 70498 CT ANGIOGRAPHY NECK: CPT | Mod: 26,,, | Performed by: RADIOLOGY

## 2025-07-23 PROCEDURE — 25500020 PHARM REV CODE 255: Mod: PO | Performed by: NURSE PRACTITIONER

## 2025-07-23 RX ADMIN — IOHEXOL 100 ML: 350 INJECTION, SOLUTION INTRAVENOUS at 02:07

## 2025-08-01 ENCOUNTER — TELEPHONE (OUTPATIENT)
Dept: RADIOLOGY | Facility: HOSPITAL | Age: 40
End: 2025-08-01

## 2025-08-01 NOTE — NURSING
"Attempted to schedule CTA cardiac with pt. He reported having some anxiety issues with recent CTA. When Asked if he wanted to get CTA cardiac scheduled, he said "call my wife".  I questioned if he was asking me to call his wife and let her make the decision as to whether or not he should schedule test. His response was "I don't want to deal with this bullshit right now. I responded that he can call us back if wanting to schedule.  "

## 2025-08-13 ENCOUNTER — PATIENT MESSAGE (OUTPATIENT)
Dept: CARDIOLOGY | Facility: CLINIC | Age: 40
End: 2025-08-13
Payer: MEDICAID

## 2025-08-13 RX ORDER — METOPROLOL SUCCINATE 50 MG/1
50 TABLET, EXTENDED RELEASE ORAL DAILY
Qty: 90 TABLET | Refills: 3 | Status: SHIPPED | OUTPATIENT
Start: 2025-08-13 | End: 2026-08-13

## 2025-09-03 ENCOUNTER — OFFICE VISIT (OUTPATIENT)
Dept: CARDIOLOGY | Facility: CLINIC | Age: 40
End: 2025-09-03
Payer: MEDICAID

## 2025-09-03 VITALS
BODY MASS INDEX: 32.69 KG/M2 | HEART RATE: 88 BPM | OXYGEN SATURATION: 99 % | WEIGHT: 241.38 LBS | HEIGHT: 72 IN | DIASTOLIC BLOOD PRESSURE: 80 MMHG | SYSTOLIC BLOOD PRESSURE: 122 MMHG

## 2025-09-03 DIAGNOSIS — R00.0 TACHYCARDIA: ICD-10-CM

## 2025-09-03 DIAGNOSIS — F41.9 ANXIETY: ICD-10-CM

## 2025-09-03 DIAGNOSIS — I10 PRIMARY HYPERTENSION: Primary | ICD-10-CM

## 2025-09-03 PROCEDURE — 99999 PR PBB SHADOW E&M-EST. PATIENT-LVL IV: CPT | Mod: PBBFAC,,, | Performed by: INTERNAL MEDICINE

## 2025-09-03 PROCEDURE — 3074F SYST BP LT 130 MM HG: CPT | Mod: CPTII,,, | Performed by: INTERNAL MEDICINE

## 2025-09-03 PROCEDURE — 1159F MED LIST DOCD IN RCRD: CPT | Mod: CPTII,,, | Performed by: INTERNAL MEDICINE

## 2025-09-03 PROCEDURE — 1160F RVW MEDS BY RX/DR IN RCRD: CPT | Mod: CPTII,,, | Performed by: INTERNAL MEDICINE

## 2025-09-03 PROCEDURE — 3079F DIAST BP 80-89 MM HG: CPT | Mod: CPTII,,, | Performed by: INTERNAL MEDICINE

## 2025-09-03 PROCEDURE — 99214 OFFICE O/P EST MOD 30 MIN: CPT | Mod: S$PBB,,, | Performed by: INTERNAL MEDICINE

## 2025-09-03 PROCEDURE — 3008F BODY MASS INDEX DOCD: CPT | Mod: CPTII,,, | Performed by: INTERNAL MEDICINE

## 2025-09-03 PROCEDURE — 99214 OFFICE O/P EST MOD 30 MIN: CPT | Mod: PBBFAC,PN | Performed by: INTERNAL MEDICINE

## 2025-09-03 PROCEDURE — 4010F ACE/ARB THERAPY RXD/TAKEN: CPT | Mod: CPTII,,, | Performed by: INTERNAL MEDICINE

## (undated) DEVICE — INTRO FAST-CATH SL1 8.5FR 63CM

## (undated) DEVICE — SET HBE EXT CARESITE FILTER

## (undated) DEVICE — REPROCESSED CATH ACUNAV 8FR

## (undated) DEVICE — COVER DRAPE ACUSON STERILE

## (undated) DEVICE — CATH LASSO NAV 25/15

## (undated) DEVICE — CATH THERMOCOOL SMTCH SF D F

## (undated) DEVICE — NDL TRNSSPTL BRK-1 18GA 98CM

## (undated) DEVICE — PAD RADI FEMORAL

## (undated) DEVICE — SHEATH INTRODUCER 9FR 11CM

## (undated) DEVICE — INTRO AGILIS MED CRL 8.5F 71CM

## (undated) DEVICE — PAD DEFIB CADENCE ADULT R2

## (undated) DEVICE — CATH BIDIRECTIONAL DF CRV 7FR

## (undated) DEVICE — SEE MEDLINE ITEM 107746

## (undated) DEVICE — ELECTRODE REM PLYHSV RETURN 9

## (undated) DEVICE — PACK EP DRAPE

## (undated) DEVICE — LINE PRESSURE MONITORING 96IN

## (undated) DEVICE — PATCH CARTO REFERENCE

## (undated) DEVICE — SET SMARTABLATE IRR TUBE

## (undated) DEVICE — NDL TRNSSPTL BRK-1 18GA 71CM

## (undated) DEVICE — SHEATH HEMOSTASIS 8.5FR

## (undated) DEVICE — INTRODUCER HEMOSTASIS 7.5F

## (undated) DEVICE — BOWL FLUID - BACK STOP